# Patient Record
Sex: FEMALE | Race: ASIAN | NOT HISPANIC OR LATINO | Employment: OTHER | URBAN - METROPOLITAN AREA
[De-identification: names, ages, dates, MRNs, and addresses within clinical notes are randomized per-mention and may not be internally consistent; named-entity substitution may affect disease eponyms.]

---

## 2017-04-09 ENCOUNTER — APPOINTMENT (OUTPATIENT)
Dept: RADIOLOGY | Facility: MEDICAL CENTER | Age: 77
End: 2017-04-09
Attending: EMERGENCY MEDICINE
Payer: MEDICARE

## 2017-04-09 ENCOUNTER — HOSPITAL ENCOUNTER (EMERGENCY)
Facility: MEDICAL CENTER | Age: 77
End: 2017-04-09
Attending: EMERGENCY MEDICINE
Payer: MEDICARE

## 2017-04-09 VITALS
SYSTOLIC BLOOD PRESSURE: 109 MMHG | WEIGHT: 125 LBS | RESPIRATION RATE: 18 BRPM | BODY MASS INDEX: 24.54 KG/M2 | HEART RATE: 89 BPM | OXYGEN SATURATION: 93 % | DIASTOLIC BLOOD PRESSURE: 80 MMHG | HEIGHT: 60 IN | TEMPERATURE: 98.1 F

## 2017-04-09 DIAGNOSIS — R06.00 DYSPNEA, UNSPECIFIED TYPE: ICD-10-CM

## 2017-04-09 PROCEDURE — 99283 EMERGENCY DEPT VISIT LOW MDM: CPT

## 2017-04-09 PROCEDURE — 71020 DX-CHEST-2 VIEWS: CPT

## 2017-04-09 ASSESSMENT — PAIN SCALES - GENERAL: PAINLEVEL_OUTOF10: 0

## 2017-04-09 NOTE — ED NOTES
"PT to triage c/o \"she just needs a chest x ray to rule out TB for her new group home\"  Per family pt has a \"yellow card\" that states she will always test positive for TB in a skin test and needs a CXR for a negative conformation for the group home  PT has no medical complaints at this time  Chief Complaint   Patient presents with   • Other     \"she just needs a domo x ray to rule out TB for her group home\"     Blood pressure 113/83, pulse 118, temperature 36.7 °C (98.1 °F), temperature source Temporal, resp. rate 16, height 1.524 m (5'), weight 56.7 kg (125 lb), SpO2 91 %.    "

## 2017-04-09 NOTE — ED AVS SNAPSHOT
Audingo Access Code: Activation code not generated  Current Audingo Status: Patient Declined    Your email address is not on file at [facility].  Email Addresses are required for you to sign up for Audingo, please contact [phone] to verify your personal information and to provide your email address prior to attempting to register for Audingo.    Malena Milton  [address]  [place] [address]    PISTIS Consultt  A secure, online tool to manage your health information     [facility]’s Audingo® is a secure, online tool that connects you to your personalized health information from the privacy of your home -- day or night - making it very easy for you to manage your healthcare. Once the activation process is completed, you can even access your medical information using the Audingo oracio, which is available for free in the Apple Oracio store or Google Play store.     To learn more about Audingo, visit [email or web address]    There are two levels of access available (as shown below):   My Chart Features  [facility] Primary Care Doctor [facility]  Specialists [facility]  Urgent  Care Non-[facility] Primary Care Doctor   Email your healthcare team securely and privately 24/7 X X X    Manage appointments: schedule your next appointment; view details of past/upcoming appointments X      Request prescription refills. X      View recent personal medical records, including lab and immunizations X X X X   View health record, including health history, allergies, medications X X X X   Read reports about your outpatient visits, procedures, consult and ER notes X X X X   See your discharge summary, which is a recap of your hospital and/or ER visit that includes your diagnosis, lab results, and care plan X X  X     How to register for PISTIS Consultt:  Once your e-mail address has been verified, follow the following steps to sign up for PISTIS Consultt.     1. Go to  [email or web address]  2. Click on the Sign Up Now box, which takes you to the New Member  Sign Up page. You will need to provide the following information:  a. Enter your [facility] Access Code exactly as it appears at the top of this page. (You will not need to use this code after you’ve completed the sign-up process. If you do not sign up before the expiration date, you must request a new code.)   b. Enter your date of birth.   c. Enter your home email address.   d. Click Submit, and follow the next screen’s instructions.  3. Create a Masterbranch ID. This will be your Masterbranch login ID and cannot be changed, so think of one that is secure and easy to remember.  4. Create a Masterbranch password. You can change your password at any time.  5. Enter your Password Reset Question and Answer. This can be used at a later time if you forget your password.   6. Enter your e-mail address. This allows you to receive e-mail notifications when new information is available in Masterbranch.  7. Click Sign Up. You can now view your health information.    For assistance activating your Masterbranch account, call [phone]

## 2017-04-09 NOTE — ED AVS SNAPSHOT
4/9/2017          Malena Milton  [address]  [place] [address]    Dear Malena:    [facility] wants to ensure your discharge home is safe and you or your loved ones have had all your questions answered regarding your care after you leave the hospital.    You may receive a telephone call within two days of your discharge.  This call is to make certain you understand your discharge instructions as well as ensure we provided you with the best care possible during your stay with us.     The call will only last approximately 3-5 minutes and will be done by a nurse.    Once again, we want to ensure your discharge home is safe and that you have a clear understanding of any next steps in your care.  If you have any questions or concerns, please do not hesitate to contact us, we are here for you.  Thank you for choosing [facility] for your healthcare needs.    Sincerely,    Harry Jurado    [facility]

## 2017-04-09 NOTE — ED AVS SNAPSHOT
Home Care Instructions                                                                                                                Malena Milton   MRN: [id number]    Department:  [facility], Emergency Dept   Date of Visit:  4/9/2017            [facility], Emergency Dept    [address] [place] [address]    Phone:  [phone]      You were seen by     Edmar iRder M.D.      Your Diagnosis Was     Dyspnea, unspecified type     R06.00       Follow-up Information     1. Follow up with [facility], Emergency Dept.    Specialty:  Emergency Medicine    Why:  As needed    Contact information    [address] [place] [id number] [phone]      Medication Information     Review all of your home medications and newly ordered medications with your primary doctor and/or pharmacist as soon as possible. Follow medication instructions as directed by your doctor and/or pharmacist.     Please keep your complete medication list with you and share with your physician. Update the information when medications are discontinued, doses are changed, or new medications (including over-the-counter products) are added; and carry medication information at all times in the event of emergency situations.               Medication List      ASK your doctor about these medications        Instructions    Morning Afternoon Evening Bedtime    acetaminophen 500 MG Tabs   Commonly known as:  TYLENOL        Take 500 mg by mouth every 4 hours. Indications: Pain   Dose:  500 mg                        alendronate 10 MG Tabs   Commonly known as:  FOSAMAX        Take 35 mg by mouth every 7 days.   Dose:  35 mg                        atenolol 50 MG Tabs   Commonly known as:  TENORMIN        Take 50 mg by mouth 2 times a day.   Dose:  50 mg                        clopidogrel 75 MG Tabs   Commonly known as:  PLAVIX        Take 75 mg by mouth every day.   Dose:  75 mg                       levetiracetam 500 MG Tabs   Commonly known as:  KEPPRA        Take 500 mg by mouth 2 times a day.   Dose:  500 mg                        nitrofurantoin monohydr macro 100 MG Caps   Commonly known as:  MACROBID        Take 1 Cap by mouth 2 times a day.   Dose:  100 mg                        pravastatin 20 MG Tabs   Commonly known as:  PRAVACHOL        Take 40 mg by mouth every day.   Dose:  40 mg                        trazodone 50 MG Tabs   Commonly known as:  DESYREL        Take 50 mg by mouth every evening.   Dose:  50 mg                        vitamin D 1000 UNIT Tabs   Commonly known as:  cholecalciferol        Take 1,000 Units by mouth every day.   Dose:  1000 Units                                Procedures and tests performed during your visit     DX-CHEST-2 VIEWS        Discharge Instructions       Return for any further concern    Patient had a clear x-ray          Patient Information     Patient Information    Following emergency treatment: all patient requiring follow-up care must return either to a private physician or a clinic if your condition worsens before you are able to obtain further medical attention, please return to the emergency room.     Billing Information    At [facility], we work to make the billing process streamlined for our patients.  Our Representatives are here to answer any questions you may have regarding your hospital bill.  If you have insurance coverage and have supplied your insurance information to us, we will submit a claim to your insurer on your behalf.  Should you have any questions regarding your bill, we can be reached online or by phone as follows:  Online: You are able pay your bills online or live chat with our representatives about any billing questions you may have. We are here to help Monday - Friday from 8:00am to 7:30pm and 9:00am - 12:00pm on Saturdays.  Please visit [email or web address]  for more information.   Phone:   [phone] or [phone]    Please note that your emergency physician, surgeon, pathologist, radiologist, anesthesiologist, and other specialists are not employed by [facility] and will therefore bill separately for their services.  Please contact them directly for any questions concerning their bills at the numbers below:     Emergency Physician Services:  [phone]  [facility]:  [phone]  [facility]:  [phone]  [facility]:  [phone]    1. Your final bill may vary from the amount quoted upon discharge if all procedures are not complete at that time, or if your doctor has additional procedures of which we are not aware. You will receive an additional bill if you return to the Emergency Department at [facility] for suture removal regardless of the facility of which the sutures were placed.     2. Please arrange for settlement of this account at the emergency registration.    3. All self-pay accounts are due in full at the time of treatment.  If you are unable to meet this obligation then payment is expected within 4-5 days.     4. If you have had radiology studies (CT, X-ray, Ultrasound, MRI), you have received a preliminary result during your emergency department visit. Please contact the radiology department [phone] to receive a copy of your final result. Please discuss the Final result with your primary physician or with the follow up physician provided.     Crisis Hotline:  [place] Crisis Hotline:  0-520-DEOYVKU or 1-777.949.7298  [place] Crisis Hotline:    1-244.605.5764 or 500-729-3494         ED Discharge Follow Up Questions    1. In order to provide you with very good care, we would like to follow up with a phone call in the next few days.  May we have your permission to contact you?     YES /  NO    2. What is the best phone number to call you? (       )_____-__________    3. What is the best time to call you?      Morning  /   Afternoon  /  Evening                   Patient Signature:  ____________________________________________________________    Date:  ____________________________________________________________

## 2017-04-09 NOTE — ED PROVIDER NOTES
"ED Provider Note    CHIEF COMPLAINT  Chief Complaint   Patient presents with   • Other     \"she just needs a domo x ray to rule out TB for her group home\"       HPI  Malena Milton is a 77 y.o. female who presents to the emergency department asking for a chest x-ray to help clear her for possible TB. The patient has had positive skin test and she was sent here to have a chest x-ray to rule out active TB. The patient does not have any symptoms at this time.    REVIEW OF SYSTEMS  No fevers    PHYSICAL EXAM  VITAL SIGNS: /83 mmHg  Pulse 110  Temp(Src) 36.7 °C (98.1 °F) (Temporal)  Resp 16  Ht 1.524 m (5')  Wt 56.7 kg (125 lb)  BMI 24.41 kg/m2  SpO2 94%  In general the patient does not appear toxic  Pulmonary chest clear to auscultation bilaterally  Cardiovascular S1 and S2 with a slightly tachycardic rate      COURSE & MEDICAL DECISION MAKING  Pertinent Labs & Imaging studies reviewed. (See chart for details)  This a 77-year-old female who is sent for a chest x-ray to help rule out tuberculosis. Chest x-ray is clear. Therefore she'll be discharged with instructions to return for any concerns.    FINAL IMPRESSION  1. Evaluation for latent TB         Disposition  The patient will be discharged in stable condition      Electronically signed by: Edmar Rider, 4/9/2017 4:57 PM        "

## 2017-04-10 NOTE — ED NOTES
Break RN  Discharge instructions given to pt's son and verbalized understanding. Explained to pt's son that xray is clear. No new complaints made. Discharged via wheelchair.

## 2017-06-03 ENCOUNTER — APPOINTMENT (OUTPATIENT)
Dept: RADIOLOGY | Facility: MEDICAL CENTER | Age: 77
End: 2017-06-03
Attending: EMERGENCY MEDICINE
Payer: MEDICARE

## 2017-06-03 ENCOUNTER — HOSPITAL ENCOUNTER (EMERGENCY)
Facility: MEDICAL CENTER | Age: 77
End: 2017-06-03
Attending: EMERGENCY MEDICINE
Payer: MEDICARE

## 2017-06-03 VITALS
SYSTOLIC BLOOD PRESSURE: 119 MMHG | TEMPERATURE: 99 F | RESPIRATION RATE: 14 BRPM | HEART RATE: 89 BPM | WEIGHT: 102.29 LBS | BODY MASS INDEX: 20.08 KG/M2 | DIASTOLIC BLOOD PRESSURE: 77 MMHG | OXYGEN SATURATION: 93 % | HEIGHT: 60 IN
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
## 2017-06-03 DIAGNOSIS — S20.212A CONTUSION OF RIB, LEFT, INITIAL ENCOUNTER: ICD-10-CM

## 2017-06-03 PROCEDURE — 71010 DX-CHEST-PORTABLE (1 VIEW): CPT

## 2017-06-03 PROCEDURE — 99283 EMERGENCY DEPT VISIT LOW MDM: CPT

## 2017-06-03 PROCEDURE — 72170 X-RAY EXAM OF PELVIS: CPT

## 2017-06-03 ASSESSMENT — PAIN SCALES - GENERAL: PAINLEVEL_OUTOF10: 6

## 2017-06-03 NOTE — ED AVS SNAPSHOT
Home Care Instructions                                                                                                                Malena Milton   MRN: [id number]    Department:  [facility], Emergency Dept   Date of Visit:  6/3/2017            [facility], Emergency Dept    [address]    [place] [address]    Phone:  [phone]      You were seen by     Zay Bolaños M.D.      Your Diagnosis Was     Fall, initial encounter     W19.XXXA       Medication Information     Review all of your home medications and newly ordered medications with your primary doctor and/or pharmacist as soon as possible. Follow medication instructions as directed by your doctor and/or pharmacist.     Please keep your complete medication list with you and share with your physician. Update the information when medications are discontinued, doses are changed, or new medications (including over-the-counter products) are added; and carry medication information at all times in the event of emergency situations.               Medication List      ASK your doctor about these medications        Instructions    Morning Afternoon Evening Bedtime    acetaminophen 500 MG Tabs   Commonly known as:  TYLENOL        Take 500 mg by mouth every 4 hours. Indications: Pain   Dose:  500 mg                        alendronate 10 MG Tabs   Commonly known as:  FOSAMAX        Take 35 mg by mouth every 7 days.   Dose:  35 mg                        atenolol 50 MG Tabs   Commonly known as:  TENORMIN        Take 50 mg by mouth 2 times a day.   Dose:  50 mg                        clopidogrel 75 MG Tabs   Commonly known as:  PLAVIX        Take 75 mg by mouth every day.   Dose:  75 mg                        levetiracetam 500 MG Tabs   Commonly known as:  KEPPRA        Take 500 mg by mouth 2 times a day.   Dose:  500 mg                        nitrofurantoin monohydr macro 100 MG Caps   Commonly known as:  MACROBID        Take 1  Cap by mouth 2 times a day.   Dose:  100 mg                        pravastatin 20 MG Tabs   Commonly known as:  PRAVACHOL        Take 40 mg by mouth every day.   Dose:  40 mg                        trazodone 50 MG Tabs   Commonly known as:  DESYREL        Take 50 mg by mouth every evening.   Dose:  50 mg                        vitamin D 1000 UNIT Tabs   Commonly known as:  cholecalciferol        Take 1,000 Units by mouth every day.   Dose:  1000 Units                                Procedures and tests performed during your visit     DX-CHEST-PORTABLE (1 VIEW)    DX-PELVIS-1 OR 2 VIEWS        Discharge Instructions       Contusion  A contusion is a deep bruise. Contusions are the result of an injury that caused bleeding under the skin. The contusion may turn blue, purple, or yellow. Minor injuries will give you a painless contusion, but more severe contusions may stay painful and swollen for a few weeks.   CAUSES   A contusion is usually caused by a blow, trauma, or direct force to an area of the body.  SYMPTOMS   · Swelling and redness of the injured area.  · Bruising of the injured area.  · Tenderness and soreness of the injured area.  · Pain.  DIAGNOSIS   The diagnosis can be made by taking a history and physical exam. An X-ray, CT scan, or MRI may be needed to determine if there were any associated injuries, such as fractures.  TREATMENT   Specific treatment will depend on what area of the body was injured. In general, the best treatment for a contusion is resting, icing, elevating, and applying cold compresses to the injured area. Over-the-counter medicines may also be recommended for pain control. Ask your caregiver what the best treatment is for your contusion.  HOME CARE INSTRUCTIONS   · Put ice on the injured area.  ¨ Put ice in a plastic bag.  ¨ Place a towel between your skin and the bag.  ¨ Leave the ice on for 15-20 minutes, 3-4 times a day, or as directed by your health care provider.  · Only take  over-the-counter or prescription medicines for pain, discomfort, or fever as directed by your caregiver. Your caregiver may recommend avoiding anti-inflammatory medicines (aspirin, ibuprofen, and naproxen) for 48 hours because these medicines may increase bruising.  · Rest the injured area.  · If possible, elevate the injured area to reduce swelling.  SEEK IMMEDIATE MEDICAL CARE IF:   · You have increased bruising or swelling.  · You have pain that is getting worse.  · Your swelling or pain is not relieved with medicines.  MAKE SURE YOU:   · Understand these instructions.  · Will watch your condition.  · Will get help right away if you are not doing well or get worse.     This information is not intended to replace advice given to you by your health care provider. Make sure you discuss any questions you have with your health care provider.     Document Released: 09/27/2006 Document Revised: 12/23/2014 Document Reviewed: 10/22/2012  [employer] Interactive Patient Education ©2016 [employer]            Patient Information     Patient Information    Following emergency treatment: all patient requiring follow-up care must return either to a private physician or a clinic if your condition worsens before you are able to obtain further medical attention, please return to the emergency room.     Billing Information    At [facility], we work to make the billing process streamlined for our patients.  Our Representatives are here to answer any questions you may have regarding your hospital bill.  If you have insurance coverage and have supplied your insurance information to us, we will submit a claim to your insurer on your behalf.  Should you have any questions regarding your bill, we can be reached online or by phone as follows:  Online: You are able pay your bills online or live chat with our representatives about any billing questions you may have. We are here to help Monday - Friday from 8:00am to 7:30pm and 9:00am -  12:00pm on Saturdays.  Please visit [email or web address]  for more information.   Phone:  [phone] or [phone]    Please note that your emergency physician, surgeon, pathologist, radiologist, anesthesiologist, and other specialists are not employed by [facility] and will therefore bill separately for their services.  Please contact them directly for any questions concerning their bills at the numbers below:     Emergency Physician Services:  [phone]  [facility]:  [phone]  [facility]:  [phone]  [facility]:  [phone]    1. Your final bill may vary from the amount quoted upon discharge if all procedures are not complete at that time, or if your doctor has additional procedures of which we are not aware. You will receive an additional bill if you return to the Emergency Department at [facility] for suture removal regardless of the facility of which the sutures were placed.     2. Please arrange for settlement of this account at the emergency registration.    3. All self-pay accounts are due in full at the time of treatment.  If you are unable to meet this obligation then payment is expected within 4-5 days.     4. If you have had radiology studies (CT, X-ray, Ultrasound, MRI), you have received a preliminary result during your emergency department visit. Please contact the radiology department [phone] to receive a copy of your final result. Please discuss the Final result with your primary physician or with the follow up physician provided.     Crisis Hotline:  [place] Crisis Hotline:  1-571-NWFUQUS or 1-282.992.3046  [place] Crisis Hotline:    1-397.279.7255 or 879-081-8054         ED Discharge Follow Up Questions    1. In order to provide you with very good care, we would like to follow up with a phone call in the next few days.  May we have your permission to contact you?     YES /  NO    2. What is  the best phone number to call you? (       )_____-__________    3. What is the best time to call you?      Morning  /  Afternoon  /  Evening                   Patient Signature:  ____________________________________________________________    Date:  ____________________________________________________________

## 2017-06-03 NOTE — ED AVS SNAPSHOT
6/3/2017    Malena Milton  [address]  [place] [address]    Dear Malena:    [facility] wants to ensure your discharge home is safe and you or your loved ones have had all of your questions answered regarding your care after you leave the hospital.    Below is a list of resources and contact information should you have any questions regarding your hospital stay, follow-up instructions, or active medical symptoms.    Questions or Concerns Regarding… Contact   Medical Questions Related to Your Discharge  (7 days a week, 8am-5pm) Contact a Nurse Care Coordinator   [phone]   Medical Questions Not Related to Your Discharge  (24 hours a day / 7 days a week)  Contact the Nurse Health Line   [phone]    Medications or Discharge Instructions Refer to your discharge packet   or contact your [facility] Primary Care Provider   [phone]   Follow-up Appointment(s) Schedule your appointment via [employer]   or contact Scheduling [phone]   Billing Review your statement via [employer]  or contact Billing [phone]   Medical Records Review your records via [employer]   or contact Medical Records [phone]     You may receive a telephone call within two days of discharge. This call is to make certain you understand your discharge instructions and have the opportunity to have any questions answered. You can also easily access your medical information, test results and upcoming appointments via the [employer] free online health management tool. You can learn more and sign up at [employer]. For assistance setting up your [employer] account, please call [phone].    Once again, we want to ensure your discharge home is safe and that you have a clear understanding of any next steps in your care. If you have any questions or concerns, please do not hesitate to contact us, we are here for you. Thank you for choosing [facility] for your healthcare needs.    Sincerely,    Your [facility] Healthcare Team

## 2017-06-03 NOTE — ED NOTES
Chief Complaint   Patient presents with   • GLF     to triage per w/c. pt w/c bound, fell this AM w/ assisted tx.   • Hip Pain     Bilat hip pain   • Rib Pain     L rib pain, difficult to take deep breath.   Son w/pt,  Educated in triage process.  Returned to [facility],  Asked to inform staff of needs.

## 2017-06-03 NOTE — ED PROVIDER NOTES
ED Provider Note    Scribed for Zay Bolaños M.D. by Kari Andre. 6/3/2017  4:20 PM    Primary care provider: Lyssa Carlos M.D.  Means of arrival: Private vehicle  History obtained from: Patient  History limited by: None    CHIEF COMPLAINT  Chief Complaint   Patient presents with   • GLF     to triage per w/c. pt w/c bound, fell this AM w/ assisted tx.   • Hip Pain     Bilat hip pain   • Rib Pain     L rib pain, difficult to take deep breath.       HPI  Malena Milton is a 77 y.o. female who presents to the Emergency Department for evaluation after having a ground level fall this morning around 7:00AM. Patient lives in a nursing home. A caregiver was helping transfer patient out of her bed into her wheelchair when the caregiver's foot slipped and they both fell to the ground. Caregiver states that the patient fell on the caregiver during the fall. She is complaining of left sided chest pain and under her left armpit. Patient states she has some pain when she takes a deep breath. She had left hip pain initially after the fall but denies any hip pain at this time. Patient denies any neck pain. She denies hitting her head during the fall and denies any loss of consciousness. Patient already takes pain medication for chronic pain. Patient is non-ambulatory.     REVIEW OF SYSTEMS  Pertinent negatives include no neck pain, hip pain, loss of consciousness.      PAST MEDICAL HISTORY   has a past medical history of Hypertension and Stroke (CMS-HCC) (06/2013).    SURGICAL HISTORY  patient denies any surgical history    SOCIAL HISTORY  Social History   Substance Use Topics   • Smoking status: Former Smoker   • Alcohol Use: No      History   Drug Use No       FAMILY HISTORY  No pertinent family history    CURRENT MEDICATIONS  No current facility-administered medications on file prior to encounter.     Current Outpatient Prescriptions on File Prior to Encounter   Medication Sig Dispense Refill   • atenolol (TENORMIN)  50 MG TABS Take 50 mg by mouth 2 times a day.     • clopidogrel (PLAVIX) 75 MG TABS Take 75 mg by mouth every day.     • trazodone (DESYREL) 50 MG TABS Take 50 mg by mouth every evening.     • acetaminophen (TYLENOL) 500 MG TABS Take 500 mg by mouth every 4 hours. Indications: Pain     • levetiracetam (KEPPRA) 500 MG TABS Take 500 mg by mouth 2 times a day.     • pravastatin (PRAVACHOL) 20 MG TABS Take 40 mg by mouth every day.     • alendronate (FOSAMAX) 10 MG TABS Take 35 mg by mouth every 7 days.     • vitamin D (CHOLECALCIFEROL) 1000 UNIT TABS Take 1,000 Units by mouth every day.     • nitrofurantoin monohydr macro (MACROBID) 100 MG CAPS Take 1 Cap by mouth 2 times a day. 20 Cap 0       ALLERGIES  No Known Allergies    PHYSICAL EXAM  VITAL SIGNS: /77 mmHg  Pulse 89  Temp(Src) 37.2 °C (99 °F)  Resp 14  Ht 1.524 m (5')  Wt 46.4 kg (102 lb 4.7 oz)  BMI 19.98 kg/m2  SpO2 93%  Constitutional: Well developed, Well nourished, No acute distress, Non-toxic appearance.   Neck: Normal range of motion, No tenderness, Supple, No stridor. No meningismus.   Cardiovascular: Regular rate and rhythm without murmur rub or gallop.  Thorax & Lungs: Clear breath sounds bilaterally without wheezes, rhonchi or rales. Tenderness of left lateral chest wall.    Abdomen: Soft non-tender non-distended. There is no rebound or guarding. No organomegaly is appreciated. Bowel sounds are normal.  Back: No CVA or spinal tenderness.   Extremities: Intact distal pulses, No edema, No tenderness, No cyanosis, No clubbing. Capillary refill is less than 2 seconds.  Musculoskeletal: Leg shortening on the left with orthopedic brace. No tenderness to palpation or major deformities noted.   Neurologic: Alert & oriented x 3, Normal motor function, Normal sensory function, No focal deficits noted. Reflexes are normal.    RADIOLOGY  DX-PELVIS-1 OR 2 VIEWS   Final Result      No evidence of pelvic fracture.      DX-CHEST-PORTABLE (1 VIEW)   Final  Result      No acute cardiac or pulmonary abnormalities are identified.        The radiologist's interpretation of all radiological studies have been reviewed by me.    COURSE & MEDICAL DECISION MAKING  Nursing notes, VS, PMSFHx reviewed in chart.    4:20 PM Patient seen and examined at bedside. Ordered for chest x-ray and pelvis x-ray to evaluate. Patient already takes pain medication for chronic pain and previous symptoms. She will not be given any further medication at this time.     5:03 PM The images of the patient's radiology studies were independently reviewed by me which did not indicate any fractures. She does not have any significant pelvic tenderness on examination but I think she does have a left-sided rib contusion doubt fracture.    5:17 PM Upon reevaluation of patient, she is resting comfortably and has no further complaints. I informed of her radiology results. Patient can be discharged home. She was instructed to return to the ED if her symptoms worsen. Patient and her son understood and were in agreement with this discharge plan. Patient is comfortable on son is prepared her for discharge    Patient has had high blood pressure while in the emergency department, felt likely secondary to medical condition. Counseled patient to monitor blood pressure at home and follow up with primary care physician.     The patient will return for new or worsening symptoms and is stable at the time of discharge.    DISPOSITION:  Patient will be discharged home in stable condition.    FINAL IMPRESSION  1. Fall, initial encounter    2. Contusion of rib, left, initial encounter          Kari ALEGRIA (Jacques), am scribing for, and in the presence of, Zay Bolaños M.D..    Electronically signed by: Kari Andre (Jacques), 6/3/2017    Zay ALEGIRA M.D. personally performed the services described in this documentation, as scribed by Kari Andre in my presence, and it is both accurate and  complete.    The note accurately reflects work and decisions made by me.  Zay Bolaños  6/3/2017  5:19 PM

## 2017-06-03 NOTE — ED NOTES
Pt to rm 63, pt transferred to bed , pants lowered to expose hips , no obvious deformities noted, pt able to bear wt

## 2017-06-04 NOTE — ED NOTES
Pt discharged with instructions and script. Pt and son  verbalize the understanding of instructions. Pt dc'd out of ER without any difficulty.

## 2017-08-04 ENCOUNTER — HOSPITAL ENCOUNTER (EMERGENCY)
Facility: MEDICAL CENTER | Age: 77
End: 2017-08-04
Attending: EMERGENCY MEDICINE
Payer: MEDICARE

## 2017-08-04 VITALS
HEIGHT: 62 IN | HEART RATE: 76 BPM | OXYGEN SATURATION: 100 % | WEIGHT: 105 LBS | TEMPERATURE: 97.9 F | RESPIRATION RATE: 18 BRPM | BODY MASS INDEX: 19.32 KG/M2

## 2017-08-04 DIAGNOSIS — K59.00 CONSTIPATION, UNSPECIFIED CONSTIPATION TYPE: ICD-10-CM

## 2017-08-04 DIAGNOSIS — R10.10 PAIN OF UPPER ABDOMEN: ICD-10-CM

## 2017-08-04 LAB
ALBUMIN SERPL BCP-MCNC: 3.8 G/DL (ref 3.2–4.9)
ALBUMIN/GLOB SERPL: 1.1 G/DL
ALP SERPL-CCNC: 49 U/L (ref 30–99)
ALT SERPL-CCNC: 12 U/L (ref 2–50)
ANION GAP SERPL CALC-SCNC: 8 MMOL/L (ref 0–11.9)
ANISOCYTOSIS BLD QL SMEAR: ABNORMAL
AST SERPL-CCNC: 19 U/L (ref 12–45)
BASOPHILS # BLD AUTO: 0 % (ref 0–1.8)
BASOPHILS # BLD: 0 K/UL (ref 0–0.12)
BILIRUB SERPL-MCNC: 0.4 MG/DL (ref 0.1–1.5)
BUN SERPL-MCNC: 17 MG/DL (ref 8–22)
CALCIUM SERPL-MCNC: 9.1 MG/DL (ref 8.5–10.5)
CHLORIDE SERPL-SCNC: 108 MMOL/L (ref 96–112)
CO2 SERPL-SCNC: 25 MMOL/L (ref 20–33)
CREAT SERPL-MCNC: 0.67 MG/DL (ref 0.5–1.4)
EOSINOPHIL # BLD AUTO: 0 K/UL (ref 0–0.51)
EOSINOPHIL NFR BLD: 0 % (ref 0–6.9)
ERYTHROCYTE [DISTWIDTH] IN BLOOD BY AUTOMATED COUNT: 51.4 FL (ref 35.9–50)
GFR SERPL CREATININE-BSD FRML MDRD: >60 ML/MIN/1.73 M 2
GLOBULIN SER CALC-MCNC: 3.4 G/DL (ref 1.9–3.5)
GLUCOSE SERPL-MCNC: 115 MG/DL (ref 65–99)
HCT VFR BLD AUTO: 38.9 % (ref 37–47)
HGB BLD-MCNC: 13.1 G/DL (ref 12–16)
LIPASE SERPL-CCNC: 18 U/L (ref 11–82)
LYMPHOCYTES # BLD AUTO: 0.99 K/UL (ref 1–4.8)
LYMPHOCYTES NFR BLD: 16.8 % (ref 22–41)
MACROCYTES BLD QL SMEAR: ABNORMAL
MANUAL DIFF BLD: NORMAL
MCH RBC QN AUTO: 33.8 PG (ref 27–33)
MCHC RBC AUTO-ENTMCNC: 33.7 G/DL (ref 33.6–35)
MCV RBC AUTO: 100.3 FL (ref 81.4–97.8)
MONOCYTES # BLD AUTO: 0.57 K/UL (ref 0–0.85)
MONOCYTES NFR BLD AUTO: 9.7 % (ref 0–13.4)
MORPHOLOGY BLD-IMP: NORMAL
NEUTROPHILS # BLD AUTO: 4.34 K/UL (ref 2–7.15)
NEUTROPHILS NFR BLD: 72.6 % (ref 44–72)
NEUTS BAND NFR BLD MANUAL: 0.9 % (ref 0–10)
NRBC # BLD AUTO: 0 K/UL
NRBC BLD AUTO-RTO: 0 /100 WBC
PLATELET # BLD AUTO: 145 K/UL (ref 164–446)
PLATELET BLD QL SMEAR: NORMAL
PMV BLD AUTO: 9.9 FL (ref 9–12.9)
POTASSIUM SERPL-SCNC: 3.9 MMOL/L (ref 3.6–5.5)
PROT SERPL-MCNC: 7.2 G/DL (ref 6–8.2)
RBC # BLD AUTO: 3.88 M/UL (ref 4.2–5.4)
RBC BLD AUTO: PRESENT
SODIUM SERPL-SCNC: 141 MMOL/L (ref 135–145)
TROPONIN I SERPL-MCNC: <0.01 NG/ML (ref 0–0.04)
WBC # BLD AUTO: 5.9 K/UL (ref 4.8–10.8)

## 2017-08-04 PROCEDURE — 93005 ELECTROCARDIOGRAM TRACING: CPT | Performed by: EMERGENCY MEDICINE

## 2017-08-04 PROCEDURE — 36415 COLL VENOUS BLD VENIPUNCTURE: CPT

## 2017-08-04 PROCEDURE — 83690 ASSAY OF LIPASE: CPT

## 2017-08-04 PROCEDURE — 80053 COMPREHEN METABOLIC PANEL: CPT

## 2017-08-04 PROCEDURE — 304561 HCHG STAT O2

## 2017-08-04 PROCEDURE — 84484 ASSAY OF TROPONIN QUANT: CPT

## 2017-08-04 PROCEDURE — 85007 BL SMEAR W/DIFF WBC COUNT: CPT

## 2017-08-04 PROCEDURE — 99285 EMERGENCY DEPT VISIT HI MDM: CPT

## 2017-08-04 PROCEDURE — 85027 COMPLETE CBC AUTOMATED: CPT

## 2017-08-04 RX ORDER — SALINE 7; 19 G/118ML; G/118ML
1 ENEMA RECTAL ONCE
Qty: 1 EACH | Refills: 0 | Status: SHIPPED | OUTPATIENT
Start: 2017-08-04 | End: 2017-08-04

## 2017-08-04 NOTE — ED AVS SNAPSHOT
Home Care Instructions                                                                                                                Malena Milton   MRN: [id number]    Department:  [facility], Emergency Dept   Date of Visit:  8/4/2017            [facility], Emergency Dept    [address]    [facility] [address]    Phone:  [phone]      You were seen by     Lucius Vergara M.D.      Your Diagnosis Was     Pain of upper abdomen     R10.10       Follow-up Information     1. Follow up with JOSE ELIAS Becerra In 1 day.    Specialty:  Family Medicine    Contact information    [address]  [address]  [facility] [id number] [phone]        Medication Information     Review all of your home medications and newly ordered medications with your primary doctor and/or pharmacist as soon as possible. Follow medication instructions as directed by your doctor and/or pharmacist.     Please keep your complete medication list with you and share with your physician. Update the information when medications are discontinued, doses are changed, or new medications (including over-the-counter products) are added; and carry medication information at all times in the event of emergency situations.               Medication List      START taking these medications        Instructions    Morning Afternoon Evening Bedtime    sodium phosphate enema   Commonly known as:  FLEET        Insert 133 mL in rectum Once for 1 dose.   Dose:  1 Enema                          ASK your doctor about these medications        Instructions    Morning Afternoon Evening Bedtime    acetaminophen 500 MG Tabs   Commonly known as:  TYLENOL        Take 500 mg by mouth every 4 hours. Indications: Pain   Dose:  500 mg                        alendronate 10 MG Tabs   Commonly known as:  FOSAMAX        Take 35 mg by mouth every 7 days.   Dose:  35 mg                        atenolol 50 MG Tabs   Commonly known as:  TENORMIN        Take 50 mg by mouth 2 times a day.   Dose:  50 mg                        clopidogrel 75 MG Tabs   Commonly known as:  PLAVIX        Take 75 mg by mouth every day.   Dose:  75 mg                        levetiracetam 500 MG Tabs   Commonly known as:  KEPPRA        Take 500 mg by mouth 2 times a day.   Dose:  500 mg                        nitrofurantoin monohydr macro 100 MG Caps   Commonly known as:  MACROBID        Take 1 Cap by mouth 2 times a day.   Dose:  100 mg                        pravastatin 20 MG Tabs   Commonly known as:  PRAVACHOL        Take 40 mg by mouth every day.   Dose:  40 mg                        trazodone 50 MG Tabs   Commonly known as:  DESYREL        Take 50 mg by mouth every evening.   Dose:  50 mg                        vitamin D 1000 UNIT Tabs   Commonly known as:  cholecalciferol        Take 1,000 Units by mouth every day.   Dose:  1000 Units                             Where to Get Your Medications      You can get these medications from any pharmacy     Bring a paper prescription for each of these medications    - sodium phosphate enema            Procedures and tests performed during your visit     CBC WITH DIFFERENTIAL    COMP METABOLIC PANEL    DIFFERENTIAL MANUAL    EKG (ER)    ESTIMATED GFR    LIPASE    MORPHOLOGY    PERIPHERAL SMEAR REVIEW    PLATELET ESTIMATE    TROPONIN        Discharge Instructions       Abdominal Pain, Adult  Many things can cause abdominal pain. Usually, abdominal pain is not caused by a disease and will improve without treatment. It can often be observed and treated at home. Your health care provider will do a physical exam and possibly order blood tests and X-rays to help determine the seriousness of your pain. However, in many cases, more time must pass before a clear cause of the pain can be found. Before that point, your health care provider may not know if you need more testing or further treatment.  HOME CARE INSTRUCTIONS   Monitor your abdominal pain  for any changes. The following actions may help to alleviate any discomfort you are experiencing:  · Only take over-the-counter or prescription medicines as directed by your health care provider.  · Do not take laxatives unless directed to do so by your health care provider.  · Try a clear liquid diet (broth, tea, or water) as directed by your health care provider. Slowly move to a bland diet as tolerated.  SEEK MEDICAL CARE IF:  · You have unexplained abdominal pain.  · You have abdominal pain associated with nausea or diarrhea.  · You have pain when you urinate or have a bowel movement.  · You experience abdominal pain that wakes you in the night.  · You have abdominal pain that is worsened or improved by eating food.  · You have abdominal pain that is worsened with eating fatty foods.  · You have a fever.  SEEK IMMEDIATE MEDICAL CARE IF:   · Your pain does not go away within 2 hours.  · You keep throwing up (vomiting).  · Your pain is felt only in portions of the abdomen, such as the right side or the left lower portion of the abdomen.  · You pass bloody or black tarry stools.  MAKE SURE YOU:  · Understand these instructions.    · Will watch your condition.    · Will get help right away if you are not doing well or get worse.       This information is not intended to replace advice given to you by your health care provider. Make sure you discuss any questions you have with your health care provider.     Document Released: 09/27/2006 Document Revised: 01/08/2016 Document Reviewed: 08/27/2014  [employer] Interactive Patient Education ©2016 [employer]        Constipation, Adult  Constipation is when a person:  · Poops (has a bowel movement) less than 3 times a week.  · Has a hard time pooping.  · Has poop that is dry, hard, or bigger than normal.  HOME CARE   · Eat foods with a lot of fiber in them. This includes fruits, vegetables, beans, and whole grains such as brown rice.  · Avoid fatty foods and foods with a  lot of sugar. This includes french fries, hamburgers, cookies, candy, and soda.  · If you are not getting enough fiber from food, take products with added fiber in them (supplements).  · Drink enough fluid to keep your pee (urine) clear or pale yellow.  · Exercise on a regular basis, or as told by your doctor.  · Go to the restroom when you feel like you need to poop. Do not hold it.  · Only take medicine as told by your doctor. Do not take medicines that help you poop (laxatives) without talking to your doctor first.  GET HELP RIGHT AWAY IF:   · You have bright red blood in your poop (stool).  · Your constipation lasts more than 4 days or gets worse.  · You have belly (abdominal) or butt (rectal) pain.  · You have thin poop (as thin as a pencil).  · You lose weight, and it cannot be explained.  MAKE SURE YOU:   · Understand these instructions.  · Will watch your condition.  · Will get help right away if you are not doing well or get worse.     This information is not intended to replace advice given to you by your health care provider. Make sure you discuss any questions you have with your health care provider.     Document Released: 06/05/2009 Document Revised: 01/08/2016 Document Reviewed: 09/29/2014  [employer] Interactive Patient Education ©2016 [employer]            Patient Information     Patient Information    Following emergency treatment: all patient requiring follow-up care must return either to a private physician or a clinic if your condition worsens before you are able to obtain further medical attention, please return to the emergency room.     Billing Information    [facility] we work to make the billing process streamlined for our patients.  Our Representatives are here to answer any questions you may have regarding your hospital bill.  If you have insurance coverage and have supplied your insurance information to us, we will submit a claim to your insurer on your behalf.  Should you have  any questions regarding your bill, we can be reached online or by phone as follows:  Online: You are able pay your bills online or live chat with our representatives about any billing questions you may have. We are here to help Monday - Friday from 8:00am to 7:30pm and 9:00am - 12:00pm on Saturdays.  Please visit [email or web address]  for more information.   Phone:  [phone] or [phone]    Please note that your emergency physician, surgeon, pathologist, radiologist, anesthesiologist, and other specialists are not employed by [facility] and will therefore bill separately for their services.  Please contact them directly for any questions concerning their bills at the numbers below:     Emergency Physician Services:  [phone]  [facility]:  [phone]  [facility]:  [phone]  [facility]:  [phone]    1. Your final bill may vary from the amount quoted upon discharge if all procedures are not complete at that time, or if your doctor has additional procedures of which we are not aware. You will receive an additional bill if you return to the Emergency Department at [facility] for suture removal regardless of the facility of which the sutures were placed.     2. Please arrange for settlement of this account at the emergency registration.    3. All self-pay accounts are due in full at the time of treatment.  If you are unable to meet this obligation then payment is expected within 4-5 days.     4. If you have had radiology studies (CT, X-ray, Ultrasound, MRI), you have received a preliminary result during your emergency department visit. Please contact the radiology department [phone] to receive a copy of your final result. Please discuss the Final result with your primary physician or with the follow up physician provided.     Crisis Hotline:  National Crisis Hotline:  5-164-UPTSKTL or 1-682.123.2957  [place]  Crisis Hotline:    1-808.618.7847 or 603-588-3617         ED Discharge Follow Up Questions    1. In order to provide you with very good care, we would like to follow up with a phone call in the next few days.  May we have your permission to contact you?     YES /  NO    2. What is the best phone number to call you? (       )_____-__________    3. What is the best time to call you?      Morning  /  Afternoon  /  Evening                   Patient Signature:  ____________________________________________________________    Date:  ____________________________________________________________

## 2017-08-04 NOTE — ED AVS SNAPSHOT
BitPass Access Code: Activation code not generated  Current BitPass Status: Patient Declined    Your email address is not on file at [facility].  Email Addresses are required for you to sign up for BitPass, please contact [phone] to verify your personal information and to provide your email address prior to attempting to register for BitPass.    Malena Milton  [address]  [place] [address]    Raizlab[employer]  A secure, online tool to manage your health information     [facility]’s BitPass® is a secure, online tool that connects you to your personalized health information from the privacy of your home -- day or night - making it very easy for you to manage your healthcare. Once the activation process is completed, you can even access your medical information using the BitPass oracio, which is available for free in the Apple Oracio store or Google Play store.     To learn more about BitPass, visit [email or web address]    There are two levels of access available (as shown below):   My Chart Features  [facility] Primary Care Doctor [facility]  Specialists [facility]  Urgent  Care Non-[facility] Primary Care Doctor   Email your healthcare team securely and privately 24/7 X X X    Manage appointments: schedule your next appointment; view details of past/upcoming appointments X      Request prescription refills. X      View recent personal medical records, including lab and immunizations X X X X   View health record, including health history, allergies, medications X X X X   Read reports about your outpatient visits, procedures, consult and ER notes X X X X   See your discharge summary, which is a recap of your hospital and/or ER visit that includes your diagnosis, lab results, and care plan X X  X     How to register for Raiz[employer]:  Once your e-mail address has been verified, follow the following steps to sign up for Raiz[employer]st.     1. Go to  [email or web address]  2. Click on the Sign Up Now box, which takes you to the New Member  Sign Up page. You will need to provide the following information:  a. Enter your [employer] Access Code exactly as it appears at the top of this page. (You will not need to use this code after you’ve completed the sign-up process. If you do not sign up before the expiration date, you must request a new code.)   b. Enter your date of birth.   c. Enter your home email address.   d. Click Submit, and follow the next screen’s instructions.  3. Create a [employer] ID. This will be your [employer] login ID and cannot be changed, so think of one that is secure and easy to remember.  4. Create a [employer] password. You can change your password at any time.  5. Enter your Password Reset Question and Answer. This can be used at a later time if you forget your password.   6. Enter your e-mail address. This allows you to receive e-mail notifications when new information is available in [employer].  7. Click Sign Up. You can now view your health information.    For assistance activating your [employer] account, call [phone]

## 2017-08-04 NOTE — ED AVS SNAPSHOT
8/4/2017    Malena Milton  [address]  [place] [address]    Dear Malena:    [facility] wants to ensure your discharge home is safe and you or your loved ones have had all of your questions answered regarding your care after you leave the hospital.    Below is a list of resources and contact information should you have any questions regarding your hospital stay, follow-up instructions, or active medical symptoms.    Questions or Concerns Regarding… Contact   Medical Questions Related to Your Discharge  (7 days a week, 8am-5pm) Contact a Nurse Care Coordinator   [phone]   Medical Questions Not Related to Your Discharge  (24 hours a day / 7 days a week)  Contact the Nurse Health Line   [phone]    Medications or Discharge Instructions Refer to your discharge packet   or contact your [facility] Primary Care Provider   [phone]   Follow-up Appointment(s) Schedule your appointment via [employer]   or contact Scheduling [phone]   Billing Review your statement via [employer]  or contact Billing [phone]   Medical Records Review your records via [employer]   or contact Medical Records [phone]     You may receive a telephone call within two days of discharge. This call is to make certain you understand your discharge instructions and have the opportunity to have any questions answered. You can also easily access your medical information, test results and upcoming appointments via the [employer] free online health management tool. You can learn more and sign up at [employer]iting. For assistance setting up your [employer] account, please call [phone].    Once again, we want to ensure your discharge home is safe and that you have a clear understanding of any next steps in your care. If you have any questions or concerns, please do not hesitate to contact us, we are here for you. Thank you for choosing [facility] for your healthcare needs.    Sincerely,    Your [facility] Healthcare Team

## 2017-08-05 LAB — EKG IMPRESSION: NORMAL

## 2017-08-05 NOTE — ED NOTES
Patient arrives via EMS from home for acute onset LUQ abd pain radiating to RUQ. Patient states pain began approx. 1 hour prior to arrival. Denies nausea. VSS. Patient hx of CVA w/ LEFT sided deficit. Patient alert and oriented x 3.

## 2017-08-05 NOTE — ED NOTES
Son at bedside. More information obtained from him, son states that patient is from [facility] phone number [phone] and [phone].  notified and she is to call [facility] and work on arranging transport.

## 2017-08-05 NOTE — ED PROVIDER NOTES
"ED Provider Note    CHIEF COMPLAINT  Chief Complaint   Patient presents with   • LUQ Pain       HPI  Malena Milton is a 77 y.o. female who presents for evaluation of now resolved abdominal pain. She states an epigastric pain that seemed to radiate towards her right upper quadrant that began approximately 1 hour prior to arrival and one hour after eating. There was no nausea or vomiting, she had no chest pain or back pain, no diarrhea. In fact, she reports that she has not had a bowel movement 2 days which is abnormal for her. No fever. Her past medical history significant only for hypertension and a CVA leaving left-sided deficits. She arrives via ambulance, she states after the gave her medicine on the ambulance her pain totally resolved.    REVIEW OF SYSTEMS  Negative for fever, rash, chest pain, dyspnea, nausea, vomiting, diarrhea, headache, back pain. All other systems are negative.     PAST MEDICAL HISTORY  Past Medical History   Diagnosis Date   • Hypertension    • Stroke ([facility]) 06/2013   • [place]        FAMILY HISTORY  No family history on file.    SOCIAL HISTORY  Social History   Substance Use Topics   • Smoking status: Former Smoker   • Smokeless tobacco: Not on file   • Alcohol Use: No       SURGICAL HISTORY  No past surgical history on file.    CURRENT MEDICATIONS  I personally reviewed the medication list in the charting documentation.     ALLERGIES  No Known Allergies    MEDICAL RECORD  I have reviewed patient's medical record and pertinent results are listed above.      PHYSICAL EXAM  VITAL SIGNS: Pulse 64  Temp(Src) 36.6 °C (97.9 °F)  Resp 18  Ht 1.575 m (5' 2.01\")  Wt 47.628 kg (105 lb)  BMI 19.20 kg/m2  SpO2 99%   Constitutional: Appear to be in any acute distress, nontoxic.  HENT: Mucus membranes moist.    Eyes: No scleral icterus. Normal conjunctiva   Neck: Supple, comfortable, nonpainful range of motion.   Cardiovascular: Regular heart rate and rhythm. "   Thorax & Lungs: Chest is nontender.  Lungs are clear to auscultation with good air movement bilaterally.  No wheeze, rhonchi, nor rales.   Abdomen: Soft, with no tenderness, rebound nor guarding.  No mass or pulsatile mass appreciated.  Skin: Warm, dry. No rash appreciated  Extremities/Musculoskeletal: No sign of trauma. No asymmetric calf tenderness, erythema or edema. Normal range of motion   Neurologic: Left, lower greater than upper, extremities chronically weak with decreased movement.  Psychiatric: Normal affect appropriate for the clinical situation.    DIAGNOSTIC STUDIES / PROCEDURES    EKG  12 Lead EKG interpreted by me to show:    Rate 66  Rhythm: Normal sinus rhythm  Axis: Normal  MN and QRS Intervals: Normal  T waves: No acute changes  ST segments: No acute changes  Ectopy: None.    My impression of this EKG: Does not indicate ischemia or arrythmia at this time.      LABS  Results for orders placed or performed during the hospital encounter of 08/04/17   CBC WITH DIFFERENTIAL   Result Value Ref Range    WBC 5.9 4.8 - 10.8 K/uL    RBC 3.88 (L) 4.20 - 5.40 M/uL    Hemoglobin 13.1 12.0 - 16.0 g/dL    Hematocrit 38.9 37.0 - 47.0 %    .3 (H) 81.4 - 97.8 fL    MCH 33.8 (H) 27.0 - 33.0 pg    MCHC 33.7 33.6 - 35.0 g/dL    RDW 51.4 (H) 35.9 - 50.0 fL    Platelet Count 145 (L) 164 - 446 K/uL    MPV 9.9 9.0 - 12.9 fL    Nucleated RBC 0.00 /100 WBC    NRBC (Absolute) 0.00 K/uL    Neutrophils-Polys 72.60 (H) 44.00 - 72.00 %    Lymphocytes 16.80 (L) 22.00 - 41.00 %    Monocytes 9.70 0.00 - 13.40 %    Eosinophils 0.00 0.00 - 6.90 %    Basophils 0.00 0.00 - 1.80 %    Neutrophils (Absolute) 4.34 2.00 - 7.15 K/uL    Lymphs (Absolute) 0.99 (L) 1.00 - 4.80 K/uL    Monos (Absolute) 0.57 0.00 - 0.85 K/uL    Eos (Absolute) 0.00 0.00 - 0.51 K/uL    Baso (Absolute) 0.00 0.00 - 0.12 K/uL    Anisocytosis 1+     Macrocytosis 1+    COMP METABOLIC PANEL   Result Value Ref Range    Sodium 141 135 - 145 mmol/L    Potassium 3.9  3.6 - 5.5 mmol/L    Chloride 108 96 - 112 mmol/L    Co2 25 20 - 33 mmol/L    Anion Gap 8.0 0.0 - 11.9    Glucose 115 (H) 65 - 99 mg/dL    Bun 17 8 - 22 mg/dL    Creatinine 0.67 0.50 - 1.40 mg/dL    Calcium 9.1 8.5 - 10.5 mg/dL    AST(SGOT) 19 12 - 45 U/L    ALT(SGPT) 12 2 - 50 U/L    Alkaline Phosphatase 49 30 - 99 U/L    Total Bilirubin 0.4 0.1 - 1.5 mg/dL    Albumin 3.8 3.2 - 4.9 g/dL    Total Protein 7.2 6.0 - 8.2 g/dL    Globulin 3.4 1.9 - 3.5 g/dL    A-G Ratio 1.1 g/dL   LIPASE   Result Value Ref Range    Lipase 18 11 - 82 U/L   TROPONIN   Result Value Ref Range    Troponin I <0.01 0.00 - 0.04 ng/mL   ESTIMATED GFR   Result Value Ref Range    GFR If African American >60 >60 mL/min/1.73 m 2    GFR If Non African American >60 >60 mL/min/1.73 m 2   DIFFERENTIAL MANUAL   Result Value Ref Range    Bands-Stabs 0.90 0.00 - 10.00 %    Manual Diff Status PERFORMED    PERIPHERAL SMEAR REVIEW   Result Value Ref Range    Peripheral Smear Review see below    PLATELET ESTIMATE   Result Value Ref Range    Plt Estimation Decreased    MORPHOLOGY   Result Value Ref Range    RBC Morphology Present    EKG (ER)   Result Value Ref Range    Report       [facility] Emergency Dept.    Test Date:  2017  Pt Name:    CLAUS BECERRA                 Department: ER  MRN:        [id number]                      Room:        19  Gender:     F                            Technician: [id number]  :        [id number]                   Requested By:GEORGE NICHOLSON  Order #:    [id number]                    Reading MD:    Measurements  Intervals                                Axis  Rate:       66                           P:          31  ID:         [id number]                          QRS:        30  QRSD:       86                           T:          6  QT:         404  QTc:        424    Interpretive Statements  SINUS RHYTHM  BORDERLINE T ABNORMALITIES, ANTERIOR LEADS  Compared to ECG 2015 08:51:53  No significant changes          COURSE & MEDICAL DECISION MAKING  I have reviewed any medical record information, laboratory studies and radiographic results as noted above.  Differential diagnoses includes: Gastritis, PUD, pancreatitis, cholecystitis, hepatitis, ACS    Encounter Summary: This is a 77 y.o. female with now resolved upper abdominal pain, present for about 2 hours she reports, began one hour after eating, has since resolved. Decreased bowel movements for the past couple days which is abnormal. Not associated with any vomiting or nausea or diarrhea or back pain or chest pain. Looks well on exam, she does have chronic neurologic deficits from a prior CVA. Will check blood work and EKG and reevaluate. ------ blood work is unremarkable, the patient continues to feel well without any symptoms at this point I think she is stable and appropriate for discharge, I will prescribe a Fleet enema to help with her constipation and she has been given strict return instructions and will follow up with her primary care physician tomorrow       DISPOSITION: Discharged home in stable condition      FINAL IMPRESSION  1. Pain of upper abdomen    2. Constipation, unspecified constipation type           This dictation was created using voice recognition software. The accuracy of the dictation is limited to the abilities of the software. I expect there may be some errors of grammar and possibly content. The nursing notes were reviewed and certain aspects of this information were incorporated into this note.    Electronically signed by: Lucius Vergara, 8/4/2017 8:50 PM

## 2017-08-05 NOTE — ED NOTES
Patient dc'd with son via wheelchair/wheelchair taxi to group home. Patient alert on DC. Patient wheelchair bound. Son verbalizes understanding of DC instructions and prescription x 1.

## 2017-08-05 NOTE — ED NOTES
called to arrange transport home. Patient hx of CVA w/ left sided hemiplegia and no one is available to pick her up.

## 2017-08-05 NOTE — ED NOTES
Spoke w/ Dinorah from Social Work, states patients' son to come and accompany patient back to group home as we are unable to contact group home representative. Son to come to bedside, and then call for wheelchair cab to transport patient.

## 2017-08-05 NOTE — DISCHARGE INSTRUCTIONS
Abdominal Pain, Adult  Many things can cause abdominal pain. Usually, abdominal pain is not caused by a disease and will improve without treatment. It can often be observed and treated at home. Your health care provider will do a physical exam and possibly order blood tests and X-rays to help determine the seriousness of your pain. However, in many cases, more time must pass before a clear cause of the pain can be found. Before that point, your health care provider may not know if you need more testing or further treatment.  HOME CARE INSTRUCTIONS   Monitor your abdominal pain for any changes. The following actions may help to alleviate any discomfort you are experiencing:  · Only take over-the-counter or prescription medicines as directed by your health care provider.  · Do not take laxatives unless directed to do so by your health care provider.  · Try a clear liquid diet (broth, tea, or water) as directed by your health care provider. Slowly move to a bland diet as tolerated.  SEEK MEDICAL CARE IF:  · You have unexplained abdominal pain.  · You have abdominal pain associated with nausea or diarrhea.  · You have pain when you urinate or have a bowel movement.  · You experience abdominal pain that wakes you in the night.  · You have abdominal pain that is worsened or improved by eating food.  · You have abdominal pain that is worsened with eating fatty foods.  · You have a fever.  SEEK IMMEDIATE MEDICAL CARE IF:   · Your pain does not go away within 2 hours.  · You keep throwing up (vomiting).  · Your pain is felt only in portions of the abdomen, such as the right side or the left lower portion of the abdomen.  · You pass bloody or black tarry stools.  MAKE SURE YOU:  · Understand these instructions.    · Will watch your condition.    · Will get help right away if you are not doing well or get worse.       This information is not intended to replace advice given to you by your health care provider. Make sure you  discuss any questions you have with your health care provider.     Document Released: 09/27/2006 Document Revised: 01/08/2016 Document Reviewed: 08/27/2014  [employer] Interactive Patient Education ©2016 [employer]        Constipation, Adult  Constipation is when a person:  · Poops (has a bowel movement) less than 3 times a week.  · Has a hard time pooping.  · Has poop that is dry, hard, or bigger than normal.  HOME CARE   · Eat foods with a lot of fiber in them. This includes fruits, vegetables, beans, and whole grains such as brown rice.  · Avoid fatty foods and foods with a lot of sugar. This includes french fries, hamburgers, cookies, candy, and soda.  · If you are not getting enough fiber from food, take products with added fiber in them (supplements).  · Drink enough fluid to keep your pee (urine) clear or pale yellow.  · Exercise on a regular basis, or as told by your doctor.  · Go to the restroom when you feel like you need to poop. Do not hold it.  · Only take medicine as told by your doctor. Do not take medicines that help you poop (laxatives) without talking to your doctor first.  GET HELP RIGHT AWAY IF:   · You have bright red blood in your poop (stool).  · Your constipation lasts more than 4 days or gets worse.  · You have belly (abdominal) or butt (rectal) pain.  · You have thin poop (as thin as a pencil).  · You lose weight, and it cannot be explained.  MAKE SURE YOU:   · Understand these instructions.  · Will watch your condition.  · Will get help right away if you are not doing well or get worse.     This information is not intended to replace advice given to you by your health care provider. Make sure you discuss any questions you have with your health care provider.     Document Released: 06/05/2009 Document Revised: 01/08/2016 Document Reviewed: 09/29/2014  [employer] Patient Education ©2016 [employer] Inc.

## 2017-08-05 NOTE — DISCHARGE PLANNING
Medical Social Work:  IAN contaced to assist with Pt transport back to [facility]. Pt has a son who works upstairs he gave us contact info and states he cant get off work at this time to accompany her home. IAN contacted Manda at [phone]. She attempted to get assistance with transportation but was unable to. Pt son then able to cover his shift and accompanied Pt in cab back to her group home.  No other needs.

## 2017-08-10 ENCOUNTER — APPOINTMENT (OUTPATIENT)
Dept: RADIOLOGY | Facility: MEDICAL CENTER | Age: 77
End: 2017-08-10
Attending: EMERGENCY MEDICINE
Payer: MEDICARE

## 2017-08-10 ENCOUNTER — HOSPITAL ENCOUNTER (OUTPATIENT)
Facility: MEDICAL CENTER | Age: 77
End: 2017-08-10
Attending: EMERGENCY MEDICINE | Admitting: INTERNAL MEDICINE
Payer: MEDICARE

## 2017-08-10 ENCOUNTER — APPOINTMENT (OUTPATIENT)
Dept: RADIOLOGY | Facility: MEDICAL CENTER | Age: 77
End: 2017-08-10
Attending: INTERNAL MEDICINE
Payer: MEDICARE

## 2017-08-10 ENCOUNTER — RESOLUTE PROFESSIONAL BILLING HOSPITAL PROF FEE (OUTPATIENT)
Dept: HOSPITALIST | Facility: MEDICAL CENTER | Age: 77
End: 2017-08-10
Payer: MEDICARE

## 2017-08-10 VITALS
TEMPERATURE: 97 F | SYSTOLIC BLOOD PRESSURE: 130 MMHG | BODY MASS INDEX: 19.32 KG/M2 | DIASTOLIC BLOOD PRESSURE: 80 MMHG | RESPIRATION RATE: 16 BRPM | HEIGHT: 62 IN | HEART RATE: 88 BPM | WEIGHT: 105 LBS | OXYGEN SATURATION: 92 %

## 2017-08-10 DIAGNOSIS — R10.84 GENERALIZED ABDOMINAL PAIN: ICD-10-CM

## 2017-08-10 PROBLEM — E87.1 HYPONATREMIA: Status: ACTIVE | Noted: 2017-08-10

## 2017-08-10 PROBLEM — R74.8 ELEVATED LIVER ENZYMES: Status: ACTIVE | Noted: 2017-08-10

## 2017-08-10 PROBLEM — R10.9 ABDOMINAL PAIN: Status: ACTIVE | Noted: 2017-08-10

## 2017-08-10 PROBLEM — Z86.73 HISTORY OF STROKE: Status: ACTIVE | Noted: 2017-08-10

## 2017-08-10 LAB
ALBUMIN SERPL BCP-MCNC: 3.7 G/DL (ref 3.2–4.9)
ALBUMIN/GLOB SERPL: 1 G/DL
ALP SERPL-CCNC: 93 U/L (ref 30–99)
ALT SERPL-CCNC: 127 U/L (ref 2–50)
ANION GAP SERPL CALC-SCNC: 13 MMOL/L (ref 0–11.9)
ANISOCYTOSIS BLD QL SMEAR: ABNORMAL
AST SERPL-CCNC: 67 U/L (ref 12–45)
BASOPHILS # BLD AUTO: 0 % (ref 0–1.8)
BASOPHILS # BLD: 0 K/UL (ref 0–0.12)
BILIRUB SERPL-MCNC: 1.6 MG/DL (ref 0.1–1.5)
BUN SERPL-MCNC: 10 MG/DL (ref 8–22)
BURR CELLS BLD QL SMEAR: NORMAL
CALCIUM SERPL-MCNC: 8.6 MG/DL (ref 8.5–10.5)
CHLORIDE SERPL-SCNC: 105 MMOL/L (ref 96–112)
CO2 SERPL-SCNC: 16 MMOL/L (ref 20–33)
CREAT SERPL-MCNC: 0.62 MG/DL (ref 0.5–1.4)
EOSINOPHIL # BLD AUTO: 0 K/UL (ref 0–0.51)
EOSINOPHIL NFR BLD: 0 % (ref 0–6.9)
ERYTHROCYTE [DISTWIDTH] IN BLOOD BY AUTOMATED COUNT: 53.4 FL (ref 35.9–50)
GFR SERPL CREATININE-BSD FRML MDRD: >60 ML/MIN/1.73 M 2
GLOBULIN SER CALC-MCNC: 3.6 G/DL (ref 1.9–3.5)
GLUCOSE SERPL-MCNC: 87 MG/DL (ref 65–99)
HAV IGM SERPL QL IA: NEGATIVE
HBV CORE IGM SER QL: NEGATIVE
HBV SURFACE AG SER QL: NEGATIVE
HCT VFR BLD AUTO: 44.2 % (ref 37–47)
HCV AB SER QL: NEGATIVE
HGB BLD-MCNC: 14.4 G/DL (ref 12–16)
LACTATE BLD-SCNC: 1.1 MMOL/L (ref 0.5–2)
LIPASE SERPL-CCNC: 11 U/L (ref 11–82)
LYMPHOCYTES # BLD AUTO: 0.76 K/UL (ref 1–4.8)
LYMPHOCYTES NFR BLD: 10.1 % (ref 22–41)
MACROCYTES BLD QL SMEAR: ABNORMAL
MANUAL DIFF BLD: NORMAL
MCH RBC QN AUTO: 33.6 PG (ref 27–33)
MCHC RBC AUTO-ENTMCNC: 32.6 G/DL (ref 33.6–35)
MCV RBC AUTO: 103.3 FL (ref 81.4–97.8)
MONOCYTES # BLD AUTO: 0.2 K/UL (ref 0–0.85)
MONOCYTES NFR BLD AUTO: 2.7 % (ref 0–13.4)
MORPHOLOGY BLD-IMP: NORMAL
NEUTROPHILS # BLD AUTO: 6.54 K/UL (ref 2–7.15)
NEUTROPHILS NFR BLD: 87.2 % (ref 44–72)
NRBC # BLD AUTO: 0 K/UL
NRBC BLD AUTO-RTO: 0 /100 WBC
PLATELET # BLD AUTO: 116 K/UL (ref 164–446)
PMV BLD AUTO: 10.6 FL (ref 9–12.9)
POIKILOCYTOSIS BLD QL SMEAR: NORMAL
POTASSIUM SERPL-SCNC: 4 MMOL/L (ref 3.6–5.5)
PROT SERPL-MCNC: 7.3 G/DL (ref 6–8.2)
RBC # BLD AUTO: 4.28 M/UL (ref 4.2–5.4)
RBC BLD AUTO: PRESENT
SCHISTOCYTES BLD QL SMEAR: NORMAL
SODIUM SERPL-SCNC: 134 MMOL/L (ref 135–145)
TROPONIN I SERPL-MCNC: <0.01 NG/ML (ref 0–0.04)
WBC # BLD AUTO: 7.5 K/UL (ref 4.8–10.8)

## 2017-08-10 PROCEDURE — 36415 COLL VENOUS BLD VENIPUNCTURE: CPT

## 2017-08-10 PROCEDURE — 84484 ASSAY OF TROPONIN QUANT: CPT

## 2017-08-10 PROCEDURE — 96374 THER/PROPH/DIAG INJ IV PUSH: CPT | Mod: XU

## 2017-08-10 PROCEDURE — 700111 HCHG RX REV CODE 636 W/ 250 OVERRIDE (IP): Performed by: INTERNAL MEDICINE

## 2017-08-10 PROCEDURE — 94760 N-INVAS EAR/PLS OXIMETRY 1: CPT

## 2017-08-10 PROCEDURE — 99235 HOSP IP/OBS SAME DATE MOD 70: CPT | Performed by: INTERNAL MEDICINE

## 2017-08-10 PROCEDURE — 700105 HCHG RX REV CODE 258: Performed by: INTERNAL MEDICINE

## 2017-08-10 PROCEDURE — 700102 HCHG RX REV CODE 250 W/ 637 OVERRIDE(OP): Performed by: INTERNAL MEDICINE

## 2017-08-10 PROCEDURE — 80074 ACUTE HEPATITIS PANEL: CPT

## 2017-08-10 PROCEDURE — 80053 COMPREHEN METABOLIC PANEL: CPT

## 2017-08-10 PROCEDURE — 83690 ASSAY OF LIPASE: CPT

## 2017-08-10 PROCEDURE — 90670 PCV13 VACCINE IM: CPT | Performed by: INTERNAL MEDICINE

## 2017-08-10 PROCEDURE — 74177 CT ABD & PELVIS W/CONTRAST: CPT

## 2017-08-10 PROCEDURE — G0378 HOSPITAL OBSERVATION PER HR: HCPCS

## 2017-08-10 PROCEDURE — 85027 COMPLETE CBC AUTOMATED: CPT

## 2017-08-10 PROCEDURE — 99285 EMERGENCY DEPT VISIT HI MDM: CPT

## 2017-08-10 PROCEDURE — A9270 NON-COVERED ITEM OR SERVICE: HCPCS | Performed by: INTERNAL MEDICINE

## 2017-08-10 PROCEDURE — 90471 IMMUNIZATION ADMIN: CPT

## 2017-08-10 PROCEDURE — 96375 TX/PRO/DX INJ NEW DRUG ADDON: CPT

## 2017-08-10 PROCEDURE — 83605 ASSAY OF LACTIC ACID: CPT

## 2017-08-10 PROCEDURE — 76700 US EXAM ABDOM COMPLETE: CPT

## 2017-08-10 PROCEDURE — 700111 HCHG RX REV CODE 636 W/ 250 OVERRIDE (IP): Performed by: EMERGENCY MEDICINE

## 2017-08-10 PROCEDURE — 85007 BL SMEAR W/DIFF WBC COUNT: CPT

## 2017-08-10 RX ORDER — TRAZODONE HYDROCHLORIDE 50 MG/1
50 TABLET ORAL NIGHTLY
Status: DISCONTINUED | OUTPATIENT
Start: 2017-08-10 | End: 2017-08-10 | Stop reason: HOSPADM

## 2017-08-10 RX ORDER — ATENOLOL 50 MG/1
50 TABLET ORAL 2 TIMES DAILY
Status: DISCONTINUED | OUTPATIENT
Start: 2017-08-10 | End: 2017-08-10 | Stop reason: HOSPADM

## 2017-08-10 RX ORDER — MORPHINE SULFATE 4 MG/ML
3 INJECTION, SOLUTION INTRAMUSCULAR; INTRAVENOUS ONCE
Status: COMPLETED | OUTPATIENT
Start: 2017-08-10 | End: 2017-08-10

## 2017-08-10 RX ORDER — PRAVASTATIN SODIUM 40 MG
40 TABLET ORAL NIGHTLY
COMMUNITY

## 2017-08-10 RX ORDER — ONDANSETRON 4 MG/1
4 TABLET, ORALLY DISINTEGRATING ORAL EVERY 4 HOURS PRN
Status: DISCONTINUED | OUTPATIENT
Start: 2017-08-10 | End: 2017-08-10 | Stop reason: HOSPADM

## 2017-08-10 RX ORDER — SODIUM CHLORIDE 9 MG/ML
INJECTION, SOLUTION INTRAVENOUS CONTINUOUS
Status: DISCONTINUED | OUTPATIENT
Start: 2017-08-10 | End: 2017-08-10 | Stop reason: HOSPADM

## 2017-08-10 RX ORDER — OXYCODONE HYDROCHLORIDE 5 MG/1
5 TABLET ORAL EVERY 4 HOURS PRN
Status: DISCONTINUED | OUTPATIENT
Start: 2017-08-10 | End: 2017-08-10 | Stop reason: HOSPADM

## 2017-08-10 RX ORDER — ONDANSETRON 2 MG/ML
4 INJECTION INTRAMUSCULAR; INTRAVENOUS EVERY 4 HOURS PRN
Status: DISCONTINUED | OUTPATIENT
Start: 2017-08-10 | End: 2017-08-10 | Stop reason: HOSPADM

## 2017-08-10 RX ORDER — ACETAMINOPHEN 325 MG/1
650 TABLET ORAL EVERY 6 HOURS PRN
Status: DISCONTINUED | OUTPATIENT
Start: 2017-08-10 | End: 2017-08-10 | Stop reason: HOSPADM

## 2017-08-10 RX ORDER — POLYETHYLENE GLYCOL 3350 17 G/17G
1 POWDER, FOR SOLUTION ORAL
Status: DISCONTINUED | OUTPATIENT
Start: 2017-08-10 | End: 2017-08-10 | Stop reason: HOSPADM

## 2017-08-10 RX ORDER — AMOXICILLIN 250 MG
2 CAPSULE ORAL 2 TIMES DAILY
Status: DISCONTINUED | OUTPATIENT
Start: 2017-08-10 | End: 2017-08-10 | Stop reason: HOSPADM

## 2017-08-10 RX ORDER — LEVETIRACETAM 500 MG/1
500 TABLET ORAL 2 TIMES DAILY
Status: DISCONTINUED | OUTPATIENT
Start: 2017-08-10 | End: 2017-08-10 | Stop reason: HOSPADM

## 2017-08-10 RX ORDER — BISACODYL 10 MG
10 SUPPOSITORY, RECTAL RECTAL
Status: DISCONTINUED | OUTPATIENT
Start: 2017-08-10 | End: 2017-08-10 | Stop reason: HOSPADM

## 2017-08-10 RX ORDER — CLOPIDOGREL BISULFATE 75 MG/1
75 TABLET ORAL DAILY
Status: DISCONTINUED | OUTPATIENT
Start: 2017-08-10 | End: 2017-08-10 | Stop reason: HOSPADM

## 2017-08-10 RX ORDER — ONDANSETRON 4 MG/1
4 TABLET, ORALLY DISINTEGRATING ORAL EVERY 4 HOURS PRN
Qty: 20 TAB | Refills: 0 | Status: SHIPPED | OUTPATIENT
Start: 2017-08-10 | End: 2018-01-14

## 2017-08-10 RX ORDER — HYDROCODONE BITARTRATE AND ACETAMINOPHEN 5; 325 MG/1; MG/1
1 TABLET ORAL EVERY 8 HOURS PRN
COMMUNITY
End: 2018-01-14

## 2017-08-10 RX ORDER — PRAVASTATIN SODIUM 20 MG
40 TABLET ORAL DAILY
Status: DISCONTINUED | OUTPATIENT
Start: 2017-08-10 | End: 2017-08-10 | Stop reason: HOSPADM

## 2017-08-10 RX ORDER — AMOXICILLIN 250 MG
1 CAPSULE ORAL DAILY
COMMUNITY
End: 2018-01-14

## 2017-08-10 RX ORDER — HEPARIN SODIUM 5000 [USP'U]/ML
5000 INJECTION, SOLUTION INTRAVENOUS; SUBCUTANEOUS EVERY 8 HOURS
Status: DISCONTINUED | OUTPATIENT
Start: 2017-08-10 | End: 2017-08-10 | Stop reason: HOSPADM

## 2017-08-10 RX ORDER — LANOLIN ALCOHOL/MO/W.PET/CERES
3 CREAM (GRAM) TOPICAL
COMMUNITY

## 2017-08-10 RX ORDER — ONDANSETRON 2 MG/ML
4 INJECTION INTRAMUSCULAR; INTRAVENOUS ONCE
Status: COMPLETED | OUTPATIENT
Start: 2017-08-10 | End: 2017-08-10

## 2017-08-10 RX ADMIN — SODIUM CHLORIDE: 9 INJECTION, SOLUTION INTRAVENOUS at 05:40

## 2017-08-10 RX ADMIN — PNEUMOCOCCAL 13-VALENT CONJUGATE VACCINE 0.5 ML: 2.2; 2.2; 2.2; 2.2; 2.2; 4.4; 2.2; 2.2; 2.2; 2.2; 2.2; 2.2; 2.2 INJECTION, SUSPENSION INTRAMUSCULAR at 15:04

## 2017-08-10 RX ADMIN — HEPARIN SODIUM 5000 UNITS: 5000 INJECTION, SOLUTION INTRAVENOUS; SUBCUTANEOUS at 05:37

## 2017-08-10 RX ADMIN — ONDANSETRON 4 MG: 2 INJECTION INTRAMUSCULAR; INTRAVENOUS at 02:12

## 2017-08-10 RX ADMIN — ATENOLOL 50 MG: 50 TABLET ORAL at 09:50

## 2017-08-10 RX ADMIN — STANDARDIZED SENNA CONCENTRATE AND DOCUSATE SODIUM 2 TABLET: 8.6; 5 TABLET, FILM COATED ORAL at 09:50

## 2017-08-10 RX ADMIN — MORPHINE SULFATE 3 MG: 4 INJECTION INTRAVENOUS at 02:11

## 2017-08-10 RX ADMIN — LEVETIRACETAM 500 MG: 500 TABLET, FILM COATED ORAL at 09:50

## 2017-08-10 ASSESSMENT — ENCOUNTER SYMPTOMS
ABDOMINAL PAIN: 1
HEADACHES: 0
NECK PAIN: 0
PALPITATIONS: 0
FEVER: 0
FOCAL WEAKNESS: 0
ORTHOPNEA: 0
SEIZURES: 0
STRIDOR: 0
WEIGHT LOSS: 0
VOMITING: 0
MYALGIAS: 0
BLURRED VISION: 0
INSOMNIA: 0
DIARRHEA: 0
SHORTNESS OF BREATH: 0
DIZZINESS: 0
NERVOUS/ANXIOUS: 0
SPUTUM PRODUCTION: 0
COUGH: 0
BACK PAIN: 0
EYE REDNESS: 0
EYE PAIN: 0
CHILLS: 0
NAUSEA: 0
HEARTBURN: 0
DEPRESSION: 0
EYE DISCHARGE: 0

## 2017-08-10 ASSESSMENT — PAIN SCALES - GENERAL
PAINLEVEL_OUTOF10: 1
PAINLEVEL_OUTOF10: 0
PAINLEVEL_OUTOF10: 6

## 2017-08-10 ASSESSMENT — LIFESTYLE VARIABLES
DO YOU DRINK ALCOHOL: NO
EVER_SMOKED: YES

## 2017-08-10 NOTE — PROGRESS NOTES
Bedside report received.  Assessment complete.  A&O x 4. Patient calls appropriately.  Denies numbness and tingling. Moves all extremities, has residual left sided weakness from past hx of stroke.   Patient up with 2 assist. Bed alarm in place.   Patient has 1/10 pain. Declines intervention.  Denies N&V. Pt diet just changed from NPO status.  Pt is incontinent of stool and urine.  Patient denies SOB.  Review plan with of care with patient. Call light and personal belongings with in reach. Hourly rounding in place. All needs met at this time.

## 2017-08-10 NOTE — PROGRESS NOTES
Went over d/c instructions with pt and son.  Follow up appt to see primary care physician.  Medications reviewed with patient and son.  Prescriptions and copy of d/c instructions given to pt.  Pt had no further questions.  Pt discharged by wheel chair at 1635

## 2017-08-10 NOTE — ED PROVIDER NOTES
"ED Provider Note    CHIEF COMPLAINT  Chief Complaint   Patient presents with   • Abdominal Pain     Per EMS, pt has been having upper quadrant pain x1 day. Pt states last BM was over 2 days ago and states BM was normal. Pt denies any N/V.        HPI  Malena Milton is a 77 y.o. female here for evaluation of abdominal pain.  The pt states her abdominal pain started about 10 pm this evening.  She has no fever, no vomiting, and denies cp, sob.  In addition, she has no radiation of the pain to the back.  She states she felt like this last week, and was seen, but does not know the results.     PAST MEDICAL HISTORY   has a past medical history of Hypertension; Stroke (CMS-HCC) (06/2013); and [place] (ce[place]) [place]    SOCIAL HISTORY  Social History     Social History Main Topics   • Smoking status: Former Smoker   • Smokeless tobacco: Not on file   • Alcohol Use: No   • Drug Use: No   • Sexual Activity: Not on file       SURGICAL HISTORY  patient denies any surgical history    CURRENT MEDICATIONS  Home Medications     Reviewed by Emeka Sanchez R.N. (Registered Nurse) on 08/10/17 at 0047  Med List Status: <None>    Medication Last Dose Status    acetaminophen (TYLENOL) 500 MG TABS  Active    alendronate (FOSAMAX) 10 MG TABS  Active    atenolol (TENORMIN) 50 MG TABS 5/28/2015 Active    clopidogrel (PLAVIX) 75 MG TABS 5/28/2015 Active    levetiracetam (KEPPRA) 500 MG TABS 5/28/2015 Active    nitrofurantoin monohydr macro (MACROBID) 100 MG CAPS  Active    pravastatin (PRAVACHOL) 20 MG TABS 5/28/2015 Active    trazodone (DESYREL) 50 MG TABS 5/27/2015 Active    vitamin D (CHOLECALCIFEROL) 1000 UNIT TABS 5/28/2015 Active                ALLERGIES  No Known Allergies    REVIEW OF SYSTEMS  See HPI for further details. Review of systems as above, otherwise all other systems are negative.     PHYSICAL EXAM  VITAL SIGNS: /78 mmHg  Pulse 90  Temp(Src) 37.6 °C (99.7 °F)  Resp 18  Ht 1.575 m (5' 2\")  " Wt 47.628 kg (105 lb)  BMI 19.20 kg/m2  SpO2 95%    Constitutional: Well appearing patient.  Not toxic, nor ill in appearance.  HEENT: NC/AT.  Extra Ocular Muscles Intact. Posterior pharynx clear, and without exudate. No uvula edema.  Neck: Full range of motion; non tender.   Cardiovascular: Regular heart rate and rhythm.  No murmurs, rubs, nor gallop appreciated.   Back:  Non tender midline.  No obvious step off or deformity.  Thorax & Lungs: Lungs are clear to auscultation with good air movement bilaterally.  No wheeze, rhonchi, nor rales.   Abdomen: Soft, with mild epigastric tenderness, no rebound nor guarding.  No mass, pulsatile mass, nor hepatosplenomegaly appreciated.  Skin: No purpura nor petechia noted.  No rash.  Extremities/Musculoskeletal: No sign of trauma.    Musculoskeletal: Range of motion is intact in all major joints.  Neurologic: Alert & oriented x 3.  CN II-XII grossly intact.   Strength and sensation is intact. Clear speech, appropriate, cooperative.  Psychiatric: Normal affect appropriate for the clinical situation.    Results for orders placed or performed during the hospital encounter of 08/10/17   COMP METABOLIC PANEL   Result Value Ref Range    Sodium 134 (L) 135 - 145 mmol/L    Potassium 4.0 3.6 - 5.5 mmol/L    Chloride 105 96 - 112 mmol/L    Co2 16 (L) 20 - 33 mmol/L    Anion Gap 13.0 (H) 0.0 - 11.9    Glucose 87 65 - 99 mg/dL    Bun 10 8 - 22 mg/dL    Creatinine 0.62 0.50 - 1.40 mg/dL    Calcium 8.6 8.5 - 10.5 mg/dL    AST(SGOT) 67 (H) 12 - 45 U/L    ALT(SGPT) 127 (H) 2 - 50 U/L    Alkaline Phosphatase 93 30 - 99 U/L    Total Bilirubin 1.6 (H) 0.1 - 1.5 mg/dL    Albumin 3.7 3.2 - 4.9 g/dL    Total Protein 7.3 6.0 - 8.2 g/dL    Globulin 3.6 (H) 1.9 - 3.5 g/dL    A-G Ratio 1.0 g/dL   LIPASE   Result Value Ref Range    Lipase 11 11 - 82 U/L   ESTIMATED GFR   Result Value Ref Range    GFR If African American >60 >60 mL/min/1.73 m 2    GFR If Non African American >60 >60 mL/min/1.73 m 2      CT-ABDOMEN-PELVIS WITH   Final Result      1.  No acute intra-abdominal findings      2.  Moderate amount of colonic stool.        PROCEDURES     MEDICAL RECORD  I have reviewed patient's medical record and pertinent results are listed above.    COURSE & MEDICAL DECISION MAKING  I have reviewed any medical record information, laboratory studies and radiographic results as noted above.    3:30 AM  The pt will be admitted to Dr. Negrete, and we will order a gb u/s.   Pt is comfortable at this time.       FINAL IMPRESSION  1. Generalized abdominal pain            Electronically signed by: Marcial Borrero, 8/10/2017 1:35 AM

## 2017-08-10 NOTE — DISCHARGE INSTRUCTIONS
Discharge Instructions    Discharged to group home by car with relative. Discharged via wheelchair, hospital escort: Yes.  Special equipment needed: Wheelchair    Be sure to schedule a follow-up appointment with your primary care doctor or any specialists as instructed.     Discharge Plan:   Influenza Vaccine Indication: Indicated: Not available from distributor/    I understand that a diet low in cholesterol, fat, and sodium is recommended for good health. Unless I have been given specific instructions below for another diet, I accept this instruction as my diet prescription.   Other diet: [place] diet    Special Instructions: See Primary care physician in 1 week.  [place] diet.    · Is patient discharged on Warfarin / Coumadin?   No     · Is patient Post Blood Transfusion?  No    Depression / Suicide Risk    As [facility] are [facility], it is important to learn how to keep safe from harming yourself.    Recognize the warning signs:  · Abrupt changes in personality, positive or negative- including increase in energy   · Giving away possessions  · Change in eating patterns- significant weight changes-  positive or negative  · Change in sleeping patterns- unable to sleep or sleeping all the time   · Unwillingness or inability to communicate  · Depression  · Unusual sadness, discouragement and loneliness  · Talk of wanting to die  · Neglect of personal appearance   · Rebelliousness- reckless behavior  · Withdrawal from people/activities they love  · Confusion- inability to concentrate     If you or a loved one observes any of these behaviors or has concerns about self-harm, here's what you can do:  · Talk about it- your feelings and reasons for harming yourself    Abdominal Pain, Adult  Many things can cause belly (abdominal) pain. Most times, the belly pain is not dangerous. Many cases of belly pain can be watched and treated at home.  HOME CARE   · Do not take medicines that help  you go poop (laxatives) unless told to by your doctor.  · Only take medicine as told by your doctor.  · Eat or drink as told by your doctor. Your doctor will tell you if you should be on a special diet.  GET HELP IF:  · You do not know what is causing your belly pain.  · You have belly pain while you are sick to your stomach (nauseous) or have runny poop (diarrhea).  · You have pain while you pee or poop.  · Your belly pain wakes you up at night.  · You have belly pain that gets worse or better when you eat.  · You have belly pain that gets worse when you eat fatty foods.  · You have a fever.  GET HELP RIGHT AWAY IF:   · The pain does not go away within 2 hours.  · You keep throwing up (vomiting).  · The pain changes and is only in the right or left part of the belly.  · You have bloody or tarry looking poop.  MAKE SURE YOU:   · Understand these instructions.  · Will watch your condition.  · Will get help right away if you are not doing well or get worse.     This information is not intended to replace advice given to you by your health care provider. Make sure you discuss any questions you have with your health care provider.     Document Released: 06/05/2009 Document Revised: 01/08/2016 Document Reviewed: 08/27/2014  [employer] Interactive Patient Education ©2016 [employer]    · Remove any means that you might use to hurt yourself (examples: pills, rope, extension cords, firearm)  · Get professional help from the community (Mental Health, Substance Abuse, psychological counseling)  · Do not be alone:Call your Safe Contact- someone whom you trust who will be there for you.  · Call your local CRISIS HOTLINE 140-9127 or 537-085-6741  · Call your local Children's Mobile Crisis Response Team [place] (113) 788-3113 or www.[employer]ivity  · Call the toll free National Suicide Prevention Hotlines   · National Suicide Prevention Lifeline 707-021-PXNS (1510)  · National [employer] Network 800-ELYOOYW  (640-0738)

## 2017-08-10 NOTE — DISCHARGE PLANNING
"IAN notified by bedside nurse that pt may need transportation home.  SW called pt's son who was at bedside to see if he will be transporting pt home.  Son is available to take pt home but will be talking pt home with him because he says \"he needs to work some things out\" at [facility].     Pt is a&o x4.  SW met with son and pt at bedside to see if pt is agreeable to go home with son.  Pt is agreeable and son says she will return to [facility] tomorrow.  Son was concerned after pt reported that she asked for hot water last night and pt was denied that request by group home staff.  Son would like to speak to staff at [facility] to ensure pt's request are met, but family overall is happy with group home.    "

## 2017-08-10 NOTE — CARE PLAN
Problem: Safety  Goal: Will remain free from injury  Outcome: PROGRESSING AS EXPECTED  Pt calls appropriately. Waits for staff to assisst.     Problem: Skin Integrity  Goal: Risk for impaired skin integrity will decrease  Outcome: PROGRESSING AS EXPECTED  Pt is asked and looked at, at least every two hours for incontinence. Pt is encouraged and helped to turn to one side every two hours.

## 2017-08-10 NOTE — DISCHARGE SUMMARY
CHIEF COMPLAINT ON ADMISSION  Chief Complaint   Patient presents with   • Abdominal Pain     Per EMS, pt has been having upper quadrant pain x1 day. Pt states last BM was over 2 days ago and states BM was normal. Pt denies any N/V.        CODE STATUS  Full Code    HPI & HOSPITAL COURSE  This is a 77 y.o. female here with abdominal pain described as dull aching, occasionally radiated to her back, constant in nature. Patient denied any nausea vomiting or diarrhea. The pain has resolved without obvious cause.  CT scan of the abdomen done and it was negative, US abdomen was negative, hepatitis panel and lipase are negative. She feels better now, no recurrence, tolerating her diet.      Therefore, she is discharged in good and stable condition with close outpatient follow-up.    SPECIFIC OUTPATIENT FOLLOW-UP  PMD this week    DISCHARGE PROBLEM LIST  Active Problems:    Abdominal pain POA: Yes    Hyponatremia POA: Yes    Elevated liver enzymes POA: Yes    History of stroke POA: Unknown  Resolved Problems:    * No resolved hospital problems. *      FOLLOW UP  No future appointments.  No follow-up provider specified.    MEDICATIONS ON DISCHARGE   Malena Milton   Home Medication Instructions ANABELLE:[id number]    Printed on:08/10/17 1148   Medication Information                      atenolol (TENORMIN) 50 MG TABS  Take 50 mg by mouth every day.             clopidogrel (PLAVIX) 75 MG TABS  Take 75 mg by mouth every day.             hydrocodone-acetaminophen (NORCO) 5-325 MG Tab per tablet  Take 1 Tab by mouth every 8 hours as needed.             levetiracetam (KEPPRA) 500 MG TABS  Take 500 mg by mouth 2 times a day.             magnesium hydroxide (MILK OF MAGNESIA) 400 MG/5ML Suspension  Take 30 mL by mouth 1 time daily as needed (for constipation).             Non Formulary Request  Take 3 mg by mouth every bedtime. Indications: Sleep             ondansetron (ZOFRAN ODT) 4 MG TABLET DISPERSIBLE  Take 1 Tab by mouth every four  hours as needed for Nausea/Vomiting (give PO if IV route is unavailable. May give per feeding tube.).             pravastatin (PRAVACHOL) 20 MG TABS  Take 40 mg by mouth every bedtime.             senna-docusate (PERICOLACE OR SENOKOT S) 8.6-50 MG Tab  Take 1 Tab by mouth every day.             trazodone (DESYREL) 50 MG TABS  Take 100 mg by mouth at bedtime as needed.                 DIET  Orders Placed This Encounter   Procedures   • DIET ORDER     Standing Status: Standing      Number of Occurrences: 1      Standing Expiration Date:      Order Specific Question:  Diet:     Answer:  Regular [1]     Order Specific Question:  Texture/Fiber modifications:     Answer:  Dysphagia 3(Mechanical Soft)specify fluid consistency(question 6) [3]     Order Specific Question:  Consistency/Fluid modifications:     Answer:  Nectar Thick [2]       ACTIVITY  As tolerated.  Weight bearing as tolerated      CONSULTATIONS  None    PROCEDURES  US abdomen  Heterogeneous appearance of the liver and spleen.    Mild gallbladder wall thickening without gallstones identified.    No biliary ductal dilatation.    Limited evaluation of the pancreas which is obscured by overlying bowel gas.    Mild right pelviectasis versus parapelvic cyst.    CT abd/pelvis with    1.  No acute intra-abdominal findings    2.  Moderate amount of colonic stool.    LABORATORY  Lab Results   Component Value Date/Time    SODIUM 134* 08/10/2017 02:10 AM    POTASSIUM 4.0 08/10/2017 02:10 AM    CHLORIDE 105 08/10/2017 02:10 AM    CO2 16* 08/10/2017 02:10 AM    GLUCOSE 87 08/10/2017 02:10 AM    BUN 10 08/10/2017 02:10 AM    CREATININE 0.62 08/10/2017 02:10 AM        Lab Results   Component Value Date/Time    WBC 7.5 08/10/2017 02:10 AM    HEMOGLOBIN 14.4 08/10/2017 02:10 AM    HEMATOCRIT 44.2 08/10/2017 02:10 AM    PLATELET COUNT 116* 08/10/2017 02:10 AM        Total time of the discharge process exceeds 36 minutes

## 2017-08-10 NOTE — IP AVS SNAPSHOT
" <p align=\"LEFT\"><IMG SRC=\"//[email or web address]\" alt=\"Image\" WIDTH=\"50%\" HEIGHT=\"200\" BORDER=\"\"></p>                   Name:Malena Milton  Medical Record Number:[id number]  CSN: [id number]    YOB: 1940   Age: 77 y.o.  Sex: female  HT:1.575 m (5' 2\") WT: 47.628 kg (105 lb)          Admit Date: 8/10/2017     Discharge Date:   Today's Date: 8/10/2017  Attending Doctor:  Timothy Negrete M.D.                  Allergies:  Review of patient's allergies indicates no known allergies.          Follow-up Information     1. Follow up with JOSE ELIAS Becerra In 1 week.    Specialty:  Family Medicine    Contact information    [address]  [address]  [facility] [id number] [phone]           Medication List      Take these Medications        Instructions    atenolol 50 MG Tabs   Commonly known as:  TENORMIN    Take 50 mg by mouth every day.   Dose:  50 mg       clopidogrel 75 MG Tabs   Commonly known as:  PLAVIX    Take 75 mg by mouth every day.   Dose:  75 mg       hydrocodone-acetaminophen 5-325 MG Tabs per tablet   Commonly known as:  NORCO    Take 1 Tab by mouth every 8 hours as needed.   Dose:  1 Tab       levetiracetam 500 MG Tabs   Commonly known as:  KEPPRA    Take 500 mg by mouth 2 times a day.   Dose:  500 mg       magnesium hydroxide 400 MG/5ML Susp   Commonly known as:  MILK OF MAGNESIA    Take 30 mL by mouth 1 time daily as needed (for constipation).   Dose:  30 mL       Non Formulary Request    Take 3 mg by mouth every bedtime. Indications: Sleep   Dose:  3 mg       ondansetron 4 MG Tbdp   Commonly known as:  ZOFRAN ODT    Take 1 Tab by mouth every four hours as needed for Nausea/Vomiting (give PO if IV route is unavailable. May give per feeding tube.).   Dose:  4 mg       pravastatin 20 MG Tabs   Commonly known as:  PRAVACHOL    Take 40 mg by mouth every bedtime.   Dose:  40 mg       senna-docusate 8.6-50 MG Tabs   Commonly known as:  PERICOLACE or SENOKOT S    Take 1 Tab by mouth every day.   Dose:  1 " Tab       trazodone 50 MG Tabs   Commonly known as:  DESYREL    Take 100 mg by mouth at bedtime as needed.   Dose:  100 mg

## 2017-08-10 NOTE — ED NOTES
Pt bib EMS.   Chief Complaint   Patient presents with   • Abdominal Pain     Per EMS, pt has been having upper quadrant pain x1 day. Pt states last BM was over 2 days ago and states BM was normal. Pt denies any N/V.      Pt changed into gown and placed on monitor.   Chart up and ready for ERP now.

## 2017-08-10 NOTE — IP AVS SNAPSHOT
[email or web address] Access Code: Activation code not generated  Current [email or web address] Status: Patient Declined    Your email address is not on file at [facility].  Email Addresses are required for you to sign up for [email or web address], please contact [phone] to verify your personal information and to provide your email address prior to attempting to register for [email or web address].    Malena Milton  [address]  [place] [address]    HubNamit  A secure, online tool to manage your health information     [facility]’s Jiuxian.com® is a secure, online tool that connects you to your personalized health information from the privacy of your home -- day or night - making it very easy for you to manage your healthcare. Once the activation process is completed, you can even access your medical information using the Jiuxian.com oracio, which is available for free in the Apple Oracio store or Google Play store.     To learn more about [email or web address], visit [email or web address]    There are two levels of access available (as shown below):   My Chart Features  [facility] Primary Care Doctor [facility]  Specialists [facility]  Urgent  Care Non-[facility] Primary Care Doctor   Email your healthcare team securely and privately 24/7 X X X    Manage appointments: schedule your next appointment; view details of past/upcoming appointments X      Request prescription refills. X      View recent personal medical records, including lab and immunizations X X X X   View health record, including health history, allergies, medications X X X X   Read reports about your outpatient visits, procedures, consult and ER notes X X X X   See your discharge summary, which is a recap of your hospital and/or ER visit that includes your diagnosis, lab results, and care plan X X  X     How to register for HubNamit:  Once your e-mail address has been verified, follow the following steps to sign up for HubNamit.     1. Go to  [email or web address]  2. Click on the Sign Up Now box, which takes you to the New Member  Sign Up page. You will need to provide the following information:  a. Enter your Newsvine Access Code exactly as it appears at the top of this page. (You will not need to use this code after you’ve completed the sign-up process. If you do not sign up before the expiration date, you must request a new code.)   b. Enter your date of birth.   c. Enter your home email address.   d. Click Submit, and follow the next screen’s instructions.  3. Create a Newsvine ID. This will be your Newsvine login ID and cannot be changed, so think of one that is secure and easy to remember.  4. Create a Newsvine password. You can change your password at any time.  5. Enter your Password Reset Question and Answer. This can be used at a later time if you forget your password.   6. Enter your e-mail address. This allows you to receive e-mail notifications when new information is available in Newsvine.  7. Click Sign Up. You can now view your health information.    For assistance activating your Newsvine account, call [phone]

## 2017-08-10 NOTE — IP AVS SNAPSHOT
" Home Care Instructions                                                                                                                  Name:Malena Milton  Medical Record Number:[id number]  CSN: [id number]    YOB: 1940   Age: 77 y.o.  Sex: female  HT:1.575 m (5' 2\") WT: 47.628 kg (105 lb)          Admit Date: 8/10/2017     Discharge Date:   Today's Date: 8/10/2017  Attending Doctor:  Timothy Negrete M.D.                  Allergies:  Review of patient's allergies indicates no known allergies.            Discharge Instructions       Discharge Instructions    Discharged to group home by car with relative. Discharged via wheelchair, hospital escort: Yes.  Special equipment needed: Wheelchair    Be sure to schedule a follow-up appointment with your primary care doctor or any specialists as instructed.     Discharge Plan:   Influenza Vaccine Indication: Indicated: Not available from distributor/    I understand that a diet low in cholesterol, fat, and sodium is recommended for good health. Unless I have been given specific instructions below for another diet, I accept this instruction as my diet prescription.   Other diet: Elsie diet    Special Instructions: See Primary care physician in 1 week.  Elsie diet.    · Is patient discharged on Warfarin / Coumadin?   No     · Is patient Post Blood Transfusion?  No    Depression / Suicide Risk    As you are discharged from this [facility] facility, it is important to learn how to keep safe from harming yourself.    Recognize the warning signs:  · Abrupt changes in personality, positive or negative- including increase in energy   · Giving away possessions  · Change in eating patterns- significant weight changes-  positive or negative  · Change in sleeping patterns- unable to sleep or sleeping all the time   · Unwillingness or inability to communicate  · Depression  · Unusual sadness, discouragement and loneliness  · Talk of wanting to die  · Neglect of personal " appearance   · Rebelliousness- reckless behavior  · Withdrawal from people/activities they love  · Confusion- inability to concentrate     If you or a loved one observes any of these behaviors or has concerns about self-harm, here's what you can do:  · Talk about it- your feelings and reasons for harming yourself    Abdominal Pain, Adult  Many things can cause belly (abdominal) pain. Most times, the belly pain is not dangerous. Many cases of belly pain can be watched and treated at home.  HOME CARE   · Do not take medicines that help you go poop (laxatives) unless told to by your doctor.  · Only take medicine as told by your doctor.  · Eat or drink as told by your doctor. Your doctor will tell you if you should be on a special diet.  GET HELP IF:  · You do not know what is causing your belly pain.  · You have belly pain while you are sick to your stomach (nauseous) or have runny poop (diarrhea).  · You have pain while you pee or poop.  · Your belly pain wakes you up at night.  · You have belly pain that gets worse or better when you eat.  · You have belly pain that gets worse when you eat fatty foods.  · You have a fever.  GET HELP RIGHT AWAY IF:   · The pain does not go away within 2 hours.  · You keep throwing up (vomiting).  · The pain changes and is only in the right or left part of the belly.  · You have bloody or tarry looking poop.  MAKE SURE YOU:   · Understand these instructions.  · Will watch your condition.  · Will get help right away if you are not doing well or get worse.     This information is not intended to replace advice given to you by your health care provider. Make sure you discuss any questions you have with your health care provider.     Document Released: 06/05/2009 Document Revised: 01/08/2016 Document Reviewed: 08/27/2014  [employer] Interactive Patient Education ©2016 [employer]    · Remove any means that you might use to hurt yourself (examples: pills, rope, extension cords,  firearm)  · Get professional help from the community (Mental Health, Substance Abuse, psychological counseling)  · Do not be alone:Call your Safe Contact- someone whom you trust who will be there for you.  · Call your local CRISIS HOTLINE 578-1567 or 183-644-7332  · Call your local Children's Mobile Crisis Response Team [place] (191) 634-5944 or www.[employer]  · Call the toll free National Suicide Prevention Hotlines   · National Suicide Prevention Lifeline 339-907-RBVI (9145)  · [employer] Hope Line Network 800-SUICIDE (405-4799)        Follow-up Information     1. Follow up with JOSE ELIAS Becerra In 1 week.    Specialty:  Family Medicine    Contact information    [address]  [address]  [facility] [id number] [phone]           Discharge Medication Instructions:    Below are the medications your physician expects you to take upon discharge:    Review all your home medications and newly ordered medications with your doctor and/or pharmacist. Follow medication instructions as directed by your doctor and/or pharmacist.    Please keep your medication list with you and share with your physician.               Medication List      START taking these medications        Instructions    Morning Afternoon Evening Bedtime    ondansetron 4 MG Tbdp   Commonly known as:  ZOFRAN ODT        Take 1 Tab by mouth every four hours as needed for Nausea/Vomiting (give PO if IV route is unavailable. May give per feeding tube.).   Dose:  4 mg                          CONTINUE taking these medications        Instructions    Morning Afternoon Evening Bedtime    atenolol 50 MG Tabs   Last time this was given:  50 mg on 8/10/2017  9:50 AM   Commonly known as:  TENORMIN        Take 50 mg by mouth every day.   Dose:  50 mg                        clopidogrel 75 MG Tabs   Commonly known as:  PLAVIX        Take 75 mg by mouth every day.   Dose:  75 mg                        hydrocodone-acetaminophen 5-325 MG Tabs per tablet   Commonly  known as:  NORCO        Take 1 Tab by mouth every 8 hours as needed.   Dose:  1 Tab                        levetiracetam 500 MG Tabs   Last time this was given:  500 mg on 8/10/2017  9:50 AM   Commonly known as:  KEPPRA        Take 500 mg by mouth 2 times a day.   Dose:  500 mg                        magnesium hydroxide 400 MG/5ML Susp   Commonly known as:  MILK OF MAGNESIA        Take 30 mL by mouth 1 time daily as needed (for constipation).   Dose:  30 mL                        Non Formulary Request        Take 3 mg by mouth every bedtime. Indications: Sleep   Dose:  3 mg                        pravastatin 20 MG Tabs   Commonly known as:  PRAVACHOL        Take 40 mg by mouth every bedtime.   Dose:  40 mg                        senna-docusate 8.6-50 MG Tabs   Last time this was given:  2 Tabs on 8/10/2017  9:50 AM   Commonly known as:  PERICOLACE or SENOKOT S        Take 1 Tab by mouth every day.   Dose:  1 Tab                        trazodone 50 MG Tabs   Commonly known as:  DESYREL        Take 100 mg by mouth at bedtime as needed.   Dose:  100 mg                          STOP taking these medications     acetaminophen 500 MG Tabs   Commonly known as:  TYLENOL               alendronate 10 MG Tabs   Commonly known as:  FOSAMAX               nitrofurantoin monohydr macro 100 MG Caps   Commonly known as:  MACROBID               vitamin D 1000 UNIT Tabs   Commonly known as:  cholecalciferol                    Where to Get Your Medications      Information about where to get these medications is not yet available     ! Ask your nurse or doctor about these medications    - ondansetron 4 MG Tbdp            Instructions           Diet / Nutrition:    Follow any diet instructions given to you by your doctor or the dietician, including how much salt (sodium) you are allowed each day.    If you are overweight, talk to your doctor about a weight reduction plan.    Activity:    Remain physically active following your doctor's  instructions about exercise and activity.    Rest often.     Any time you become even a little tired or short of breath, SIT DOWN and rest.    Worsening Symptoms:    Report any of the following signs and symptoms to the doctor's office immediately:    *Pain of jaw, arm, or neck  *Chest pain not relieved by medication                               *Dizziness or loss of consciousness  *Difficulty breathing even when at rest   *More tired than usual                                       *Bleeding drainage or swelling of surgical site  *Swelling of feet, ankles, legs or stomach                 *Fever (>100ºF)  *Pink or blood tinged sputum  *Weight gain (3lbs/day or 5lbs /week)           *Shock from internal defibrillator (if applicable)  *Palpitations or irregular heartbeats                *Cool and/or numb extremities    Stroke Awareness    Common Risk Factors for Stroke include:    Age  Atrial Fibrillation  Carotid Artery Stenosis  Diabetes Mellitus  Excessive alcohol consumption  High blood pressure  Overweight   Physical inactivity  Smoking    Warning signs and symptoms of a stroke include:    *Sudden numbness or weakness of the face, arm or leg (especially on one side of the body).  *Sudden confusion, trouble speaking or understanding.  *Sudden trouble seeing in one or both eyes.  *Sudden trouble walking, dizziness, loss of balance or coordination.Sudden severe headache with no known cause.    It is very important to get treatment quickly when a stroke occurs. If you experience any of the above warning signs, call 911 immediately.                   Disclaimer         Quit Smoking / Tobacco Use:    I understand the use of any tobacco products increases my chance of suffering from future heart disease or stroke and could cause other illnesses which may shorten my life. Quitting the use of tobacco products is the single most important thing I can do to improve my health. For further information on smoking / tobacco  cessation call a Toll Free Quit Line at 1-517.129.7771 (*National Cancer [place]) or 1-927.968.3387 (American Lung Association) or you can access the web based program at www.lungusa.org.    [place] Tobacco Users Help Line:  (696) 837-8595       Toll Free: 1-717.587.2315  Quit Tobacco Program [facility] (063)563-3882    Crisis Hotline:    [place] Crisis Hotline:  8-888-DSJDBBJ or 1-884.895.2160    [place] Crisis Hotline:    1-839.118.4477 or 050-764-1729    Discharge Survey:   Thank you for choosing [facility]. We hope we did everything we could to make your hospital stay a pleasant one. You may be receiving a phone survey and we would appreciate your time and participation in answering the questions. Your input is very valuable to us in our efforts to improve our service to our patients and their families.        My signature on this form indicates that:    1. I have reviewed and understand the above information.  2. My questions regarding this information have been answered to my satisfaction.  3. I have formulated a plan with my discharge nurse to obtain my prescribed medications for home.                  Disclaimer         __________________________________                     __________       ________                       Patient Signature                                                 Date                    Time

## 2017-08-10 NOTE — IP AVS SNAPSHOT
8/10/2017    Malena Milton  [address]  [place] [address]    Dear Malena:    [facility] wants to ensure your discharge home is safe and you or your loved ones have had all of your questions answered regarding your care after you leave the hospital.    Below is a list of resources and contact information should you have any questions regarding your hospital stay, follow-up instructions, or active medical symptoms.    Questions or Concerns Regarding… Contact   Medical Questions Related to Your Discharge  (7 days a week, 8am-5pm) Contact a Nurse Care Coordinator   [phone]   Medical Questions Not Related to Your Discharge  (24 hours a day / 7 days a week)  Contact the Nurse Health Line   [phone]    Medications or Discharge Instructions Refer to your discharge packet   or contact your [facility] Primary Care Provider   [phone]   Follow-up Appointment(s) Schedule your appointment via Memoir   or contact Scheduling [phone]   Billing Review your statement via Memoir  or contact Billing [phone]   Medical Records Review your records via Memoir   or contact Medical Records [phone]     You may receive a telephone call within two days of discharge. This call is to make certain you understand your discharge instructions and have the opportunity to have any questions answered. You can also easily access your medical information, test results and upcoming appointments via the Memoir free online health management tool. You can learn more and sign up at [facility]/Memoir. For assistance setting up your Memoir account, please call [phone].    Once again, we want to ensure your discharge home is safe and that you have a clear understanding of any next steps in your care. If you have any questions or concerns, please do not hesitate to contact us, we are here for you. Thank you for choosing [facility] for your healthcare needs.    Sincerely,    Your [facility] Healthcare Team

## 2017-08-10 NOTE — ASSESSMENT & PLAN NOTE
Etiology unclear  Lipase is normal  CT scan of abdomen is normal  Has liver enzyme elevation with my bilirubin level  We'll get ultrasound abdomen  Pain management

## 2017-08-10 NOTE — H&P
Hospital Medicine History and Physical      Date of Service  8/10/2017    Chief Complaint  Chief Complaint   Patient presents with   • Abdominal Pain     Per EMS, pt has been having upper quadrant pain x1 day. Pt states last BM was over 2 days ago and states BM was normal. Pt denies any N/V.        History of Presenting Illness  Yoan is a 77 y.o. female who presents with abdominal pain started around 10 PM last night. She describes the pain as dull aching pain, 7 out of 10, occasionally radiated to her back, constant in nature. Patient denied any nausea vomiting or diarrhea. In the ER she has CT scan of the abdomen done and it was negative. Bowel found to have elevated liver enzyme and mildly elevated bilirubin level. Ultrasound abdomen is currently pending. She will be admitted to the floor for further management.    Primary Care Physician  JOSE ELIAS Becerra      Code Status  Full code per patient     Review of Systems  Review of Systems   Constitutional: Negative for fever, chills and weight loss.   HENT: Negative for congestion and nosebleeds.    Eyes: Negative for blurred vision, pain, discharge and redness.   Respiratory: Negative for cough, sputum production, shortness of breath and stridor.    Cardiovascular: Negative for chest pain, palpitations and orthopnea.   Gastrointestinal: Positive for abdominal pain. Negative for heartburn, nausea, vomiting and diarrhea.   Genitourinary: Negative for dysuria, urgency and frequency.   Musculoskeletal: Negative for myalgias, back pain and neck pain.   Skin: Negative for itching and rash.   Neurological: Negative for dizziness, focal weakness, seizures and headaches.   Psychiatric/Behavioral: Negative for depression. The patient is not nervous/anxious and does not have insomnia.      Please see HPI, all other systems were reviewed and are negative (AMA/CMS criteria)     Past Medical History  Past Medical History   Diagnosis Date   • Hypertension    • Stroke  (CMS-HCC) 2013   • CVA (cerebral vascular accident) (CMS-HCC)        Surgical History  History reviewed. No pertinent past surgical history.    Medications  No current facility-administered medications on file prior to encounter.     Current Outpatient Prescriptions on File Prior to Encounter   Medication Sig Dispense Refill   • atenolol (TENORMIN) 50 MG TABS Take 50 mg by mouth 2 times a day.     • clopidogrel (PLAVIX) 75 MG TABS Take 75 mg by mouth every day.     • trazodone (DESYREL) 50 MG TABS Take 50 mg by mouth every evening.     • acetaminophen (TYLENOL) 500 MG TABS Take 500 mg by mouth every 4 hours. Indications: Pain     • levetiracetam (KEPPRA) 500 MG TABS Take 500 mg by mouth 2 times a day.     • pravastatin (PRAVACHOL) 20 MG TABS Take 40 mg by mouth every day.     • alendronate (FOSAMAX) 10 MG TABS Take 35 mg by mouth every 7 days.     • vitamin D (CHOLECALCIFEROL) 1000 UNIT TABS Take 1,000 Units by mouth every day.     • nitrofurantoin monohydr macro (MACROBID) 100 MG CAPS Take 1 Cap by mouth 2 times a day. 20 Cap 0     Family History  No family history on file.  No pertinent family history    Social History  Social History   Substance Use Topics   • Smoking status: Former Smoker   • Smokeless tobacco: None   • Alcohol Use: No       Allergies  No Known Allergies     Physical Exam  Laboratory   Hemodynamics  Temp (24hrs), Av.6 °C (99.7 °F), Min:37.6 °C (99.7 °F), Max:37.6 °C (99.7 °F)   Temperature: 37.6 °C (99.7 °F)  Pulse  Av  Min: 90  Max: 90 Heart Rate (Monitored): 85  Blood Pressure : 133/78 mmHg, NIBP: 120/74 mmHg      Respiratory      Respiration: 20, Pulse Oximetry: 95 %             Physical Exam   Constitutional: She is oriented to person, place, and time. No distress.   HENT:   Head: Normocephalic and atraumatic.   Mouth/Throat: Oropharynx is clear and moist.   Eyes: Conjunctivae and EOM are normal. Pupils are equal, round, and reactive to light.   Neck: Normal range of motion. Neck  supple. No tracheal deviation present. No thyromegaly present.   Cardiovascular: Normal rate and regular rhythm.    No murmur heard.  Pulmonary/Chest: Effort normal and breath sounds normal. No respiratory distress. She has no wheezes.   Abdominal: Soft. Bowel sounds are normal. She exhibits no distension. There is tenderness. There is no rebound and no guarding.   Musculoskeletal: She exhibits no edema or tenderness.   Neurological: She is alert and oriented to person, place, and time. No cranial nerve deficit.   Skin: Skin is warm and dry. She is not diaphoretic. No erythema.   Psychiatric: She has a normal mood and affect. Her behavior is normal. Thought content normal.           Recent Labs      08/10/17   0210   SODIUM  134*   POTASSIUM  4.0   CHLORIDE  105   CO2  16*   GLUCOSE  87   BUN  10   CREATININE  0.62   CALCIUM  8.6     Recent Labs      08/10/17   0210   ALTSGPT  127*   ASTSGOT  67*   ALKPHOSPHAT  93   TBILIRUBIN  1.6*   LIPASE  11   GLUCOSE  87                 Lab Results   Component Value Date    TROPONINI <0.01 08/10/2017       Imaging  CT-ABDOMEN-PELVIS WITH   Final Result      1.  No acute intra-abdominal findings      2.  Moderate amount of colonic stool.           Assessment/Plan         Abdominal pain (present on admission)  Assessment & Plan  Etiology unclear  Lipase is normal  CT scan of abdomen is normal  Has liver enzyme elevation with my bilirubin level  We'll get ultrasound abdomen  Pain management    Hyponatremia (present on admission)  Assessment & Plan  On IV fluid    Elevated liver enzymes (present on admission)  Assessment & Plan  Plan as above  Will check hepatitis panel    History of stroke  Assessment & Plan  Continue plavix, statin        Prophylaxis:  sc heparin         This dictation was created using voice recognition software. The accuracy of the dictation is limited to the abilities of the software. I expect there may be some errors of grammar and possibly content.

## 2017-08-10 NOTE — PROGRESS NOTES
Pt admitted from ER, came to floor by timur, pt transferred to bed, cleaned of incontinence and made comfortable.   Pt and son updated on poc, including staying NPO except ice chips.  Admission history done, pt assessed.  No acute symptoms nor signs of distress. VSS.  Pt denies any abdominal pain at this time.  Bowel sounds normoactive, +Flatus.   -N/V, incontinent of voiding, last bm 8/8.  Pt denies any other needs at this time.  IV fluids to be hung, bed alarm being placed.  Call light within pt reach, will continue to monitor.

## 2017-09-19 PROBLEM — D49.2 NEOPLASM OF UNSPECIFIED BEHAVIOR OF BONE, SOFT TISSUE, AND SKIN: Status: RESOLVED | Noted: 2017-09-05 | Resolved: 2017-09-19

## 2017-11-11 NOTE — ED AVS SNAPSHOT
Essential Testing Access Code: Activation code not generated  Current Essential Testing Status: Patient Declined    Your email address is not on file at [facility].  Email Addresses are required for you to sign up for Essential Testing, please contact [phone] to verify your personal information and to provide your email address prior to attempting to register for Essential Testing.    Malena Milton  [address]  [place] [address]    [employer]  A secure, online tool to manage your health information     [facility]’s Essential Testing® is a secure, online tool that connects you to your personalized health information from the privacy of your home -- day or night - making it very easy for you to manage your healthcare. Once the activation process is completed, you can even access your medical information using the Essential Testing oracio, which is available for free in the Apple Oracio store or Google Play store.     To learn more about Essential Testing, visit [email or web address]    There are two levels of access available (as shown below):   My Chart Features  [facility] Primary Care Doctor [facility]  Specialists [facility]  Urgent  Care Non-[facility] Primary Care Doctor   Email your healthcare team securely and privately 24/7 X X X    Manage appointments: schedule your next appointment; view details of past/upcoming appointments X      Request prescription refills. X      View recent personal medical records, including lab and immunizations X X X X   View health record, including health history, allergies, medications X X X X   Read reports about your outpatient visits, procedures, consult and ER notes X X X X   See your discharge summary, which is a recap of your hospital and/or ER visit that includes your diagnosis, lab results, and care plan X X  X     How to register for [employer]:  Once your e-mail address has been verified, follow the following steps to sign up for [employer].     1. Go to  [email or web address]  2. Click on the Sign Up Now box, which takes you to the New Member  Sign Up page. You will need to provide the following information:  a. Enter your [employer] Access Code exactly as it appears at the top of this page. (You will not need to use this code after you’ve completed the sign-up process. If you do not sign up before the expiration date, you must request a new code.)   b. Enter your date of birth.   c. Enter your home email address.   d. Click Submit, and follow the next screen’s instructions.  3. Create a [employer] ID. This will be your [employer] login ID and cannot be changed, so think of one that is secure and easy to remember.  4. Create a [employer] password. You can change your password at any time.  5. Enter your Password Reset Question and Answer. This can be used at a later time if you forget your password.   6. Enter your e-mail address. This allows you to receive e-mail notifications when new information is available in [employer].  7. Click Sign Up. You can now view your health information.    For assistance activating your [employer] account, call [phone]          Never smoker

## 2018-01-14 ENCOUNTER — HOSPITAL ENCOUNTER (INPATIENT)
Facility: MEDICAL CENTER | Age: 78
LOS: 4 days | DRG: 189 | End: 2018-01-18
Attending: EMERGENCY MEDICINE | Admitting: INTERNAL MEDICINE
Payer: MEDICARE

## 2018-01-14 ENCOUNTER — APPOINTMENT (OUTPATIENT)
Dept: RADIOLOGY | Facility: MEDICAL CENTER | Age: 78
DRG: 189 | End: 2018-01-14
Attending: EMERGENCY MEDICINE
Payer: MEDICARE

## 2018-01-14 ENCOUNTER — APPOINTMENT (OUTPATIENT)
Dept: RADIOLOGY | Facility: MEDICAL CENTER | Age: 78
DRG: 189 | End: 2018-01-14
Attending: INTERNAL MEDICINE
Payer: MEDICARE

## 2018-01-14 ENCOUNTER — RESOLUTE PROFESSIONAL BILLING HOSPITAL PROF FEE (OUTPATIENT)
Dept: HOSPITALIST | Facility: MEDICAL CENTER | Age: 78
End: 2018-01-14
Payer: MEDICARE

## 2018-01-14 DIAGNOSIS — J06.9 UPPER RESPIRATORY TRACT INFECTION, UNSPECIFIED TYPE: ICD-10-CM

## 2018-01-14 DIAGNOSIS — I48.91 ATRIAL FIBRILLATION, RAPID (HCC): ICD-10-CM

## 2018-01-14 DIAGNOSIS — R06.00 DYSPNEA, UNSPECIFIED TYPE: ICD-10-CM

## 2018-01-14 PROBLEM — J21.9 BRONCHIOLITIS: Status: ACTIVE | Noted: 2018-01-14

## 2018-01-14 PROBLEM — I47.10 SVT (SUPRAVENTRICULAR TACHYCARDIA) (HCC): Status: ACTIVE | Noted: 2018-01-14

## 2018-01-14 PROBLEM — I10 HTN (HYPERTENSION): Status: ACTIVE | Noted: 2018-01-14

## 2018-01-14 PROBLEM — R91.1 PULMONARY NODULE: Status: ACTIVE | Noted: 2018-01-14

## 2018-01-14 PROBLEM — J02.9 PHARYNGITIS: Status: ACTIVE | Noted: 2018-01-14

## 2018-01-14 PROBLEM — J96.01 ACUTE HYPOXEMIC RESPIRATORY FAILURE (HCC): Status: ACTIVE | Noted: 2018-01-14

## 2018-01-14 PROBLEM — J45.909 RAD (REACTIVE AIRWAY DISEASE): Status: ACTIVE | Noted: 2018-01-14

## 2018-01-14 PROBLEM — D72.821 MONOCYTOSIS: Status: ACTIVE | Noted: 2018-01-14

## 2018-01-14 PROBLEM — D69.6 THROMBOCYTOPENIA (HCC): Status: ACTIVE | Noted: 2018-01-14

## 2018-01-14 PROBLEM — E78.5 HLD (HYPERLIPIDEMIA): Status: ACTIVE | Noted: 2018-01-14

## 2018-01-14 LAB
ALBUMIN SERPL BCP-MCNC: 4 G/DL (ref 3.2–4.9)
ALBUMIN/GLOB SERPL: 0.9 G/DL
ALP SERPL-CCNC: 58 U/L (ref 30–99)
ALT SERPL-CCNC: 15 U/L (ref 2–50)
ANION GAP SERPL CALC-SCNC: 13 MMOL/L (ref 0–11.9)
ANISOCYTOSIS BLD QL SMEAR: ABNORMAL
APTT PPP: 32.6 SEC (ref 24.7–36)
AST SERPL-CCNC: 25 U/L (ref 12–45)
BASOPHILS # BLD AUTO: 0 % (ref 0–1.8)
BASOPHILS # BLD: 0 K/UL (ref 0–0.12)
BILIRUB SERPL-MCNC: 0.7 MG/DL (ref 0.1–1.5)
BNP SERPL-MCNC: 30 PG/ML (ref 0–100)
BUN SERPL-MCNC: 18 MG/DL (ref 8–22)
CALCIUM SERPL-MCNC: 8.9 MG/DL (ref 8.5–10.5)
CHLORIDE SERPL-SCNC: 103 MMOL/L (ref 96–112)
CO2 SERPL-SCNC: 21 MMOL/L (ref 20–33)
CREAT SERPL-MCNC: 0.72 MG/DL (ref 0.5–1.4)
CRP SERPL HS-MCNC: 5.79 MG/DL (ref 0–0.75)
EKG IMPRESSION: NORMAL
EOSINOPHIL # BLD AUTO: 0 K/UL (ref 0–0.51)
EOSINOPHIL NFR BLD: 0 % (ref 0–6.9)
ERYTHROCYTE [DISTWIDTH] IN BLOOD BY AUTOMATED COUNT: 45.5 FL (ref 35.9–50)
ERYTHROCYTE [SEDIMENTATION RATE] IN BLOOD BY WESTERGREN METHOD: 89 MM/HOUR (ref 0–30)
FLUAV RNA SPEC QL NAA+PROBE: NEGATIVE
FLUAV+FLUBV AG SPEC QL IA: NORMAL
FLUBV RNA SPEC QL NAA+PROBE: NEGATIVE
GLOBULIN SER CALC-MCNC: 4.4 G/DL (ref 1.9–3.5)
GLUCOSE SERPL-MCNC: 112 MG/DL (ref 65–99)
GRAM STN SPEC: NORMAL
HCT VFR BLD AUTO: 44.8 % (ref 37–47)
HETEROPH AB SER QL: NEGATIVE
HGB BLD-MCNC: 15.1 G/DL (ref 12–16)
INR PPP: 1 (ref 0.87–1.13)
LACTATE BLD-SCNC: 1.3 MMOL/L (ref 0.5–2)
LACTATE BLD-SCNC: 2 MMOL/L (ref 0.5–2)
LYMPHOCYTES # BLD AUTO: 1.07 K/UL (ref 1–4.8)
LYMPHOCYTES NFR BLD: 18.7 % (ref 22–41)
MACROCYTES BLD QL SMEAR: ABNORMAL
MAGNESIUM SERPL-MCNC: 2.1 MG/DL (ref 1.5–2.5)
MANUAL DIFF BLD: NORMAL
MCH RBC QN AUTO: 33.5 PG (ref 27–33)
MCHC RBC AUTO-ENTMCNC: 33.7 G/DL (ref 33.6–35)
MCV RBC AUTO: 99.3 FL (ref 81.4–97.8)
MONOCYTES # BLD AUTO: 1.68 K/UL (ref 0–0.85)
MONOCYTES NFR BLD AUTO: 29.5 % (ref 0–13.4)
MORPHOLOGY BLD-IMP: NORMAL
NEUTROPHILS # BLD AUTO: 2.95 K/UL (ref 2–7.15)
NEUTROPHILS NFR BLD: 47.3 % (ref 44–72)
NEUTS BAND NFR BLD MANUAL: 4.5 % (ref 0–10)
NRBC # BLD AUTO: 0 K/UL
NRBC BLD-RTO: 0 /100 WBC
PHOSPHATE SERPL-MCNC: 2.3 MG/DL (ref 2.5–4.5)
PLATELET # BLD AUTO: 150 K/UL (ref 164–446)
PMV BLD AUTO: 10.6 FL (ref 9–12.9)
POTASSIUM SERPL-SCNC: 3.7 MMOL/L (ref 3.6–5.5)
PROCALCITONIN SERPL-MCNC: 0.1 NG/ML
PROT SERPL-MCNC: 8.4 G/DL (ref 6–8.2)
PROTHROMBIN TIME: 12.9 SEC (ref 12–14.6)
RBC # BLD AUTO: 4.51 M/UL (ref 4.2–5.4)
RBC BLD AUTO: PRESENT
RSV AG SPEC QL IA: NORMAL
SIGNIFICANT IND 70042: NORMAL
SITE SITE: NORMAL
SODIUM SERPL-SCNC: 137 MMOL/L (ref 135–145)
SOURCE SOURCE: NORMAL
TROPONIN I SERPL-MCNC: <0.01 NG/ML (ref 0–0.04)
TSH SERPL DL<=0.005 MIU/L-ACNC: 1.06 UIU/ML (ref 0.38–5.33)
WBC # BLD AUTO: 5.7 K/UL (ref 4.8–10.8)

## 2018-01-14 PROCEDURE — 85610 PROTHROMBIN TIME: CPT

## 2018-01-14 PROCEDURE — 70450 CT HEAD/BRAIN W/O DYE: CPT

## 2018-01-14 PROCEDURE — 86645 CMV ANTIBODY IGM: CPT

## 2018-01-14 PROCEDURE — 86665 EPSTEIN-BARR CAPSID VCA: CPT | Mod: 91

## 2018-01-14 PROCEDURE — 83880 ASSAY OF NATRIURETIC PEPTIDE: CPT

## 2018-01-14 PROCEDURE — 84443 ASSAY THYROID STIM HORMONE: CPT

## 2018-01-14 PROCEDURE — 87400 INFLUENZA A/B EACH AG IA: CPT

## 2018-01-14 PROCEDURE — 86664 EPSTEIN-BARR NUCLEAR ANTIGEN: CPT

## 2018-01-14 PROCEDURE — 700105 HCHG RX REV CODE 258: Performed by: EMERGENCY MEDICINE

## 2018-01-14 PROCEDURE — 85652 RBC SED RATE AUTOMATED: CPT

## 2018-01-14 PROCEDURE — 96374 THER/PROPH/DIAG INJ IV PUSH: CPT

## 2018-01-14 PROCEDURE — 700105 HCHG RX REV CODE 258: Performed by: INTERNAL MEDICINE

## 2018-01-14 PROCEDURE — 87205 SMEAR GRAM STAIN: CPT

## 2018-01-14 PROCEDURE — 85007 BL SMEAR W/DIFF WBC COUNT: CPT | Mod: 91

## 2018-01-14 PROCEDURE — 700101 HCHG RX REV CODE 250: Performed by: INTERNAL MEDICINE

## 2018-01-14 PROCEDURE — 36415 COLL VENOUS BLD VENIPUNCTURE: CPT

## 2018-01-14 PROCEDURE — 700117 HCHG RX CONTRAST REV CODE 255: Performed by: EMERGENCY MEDICINE

## 2018-01-14 PROCEDURE — 83735 ASSAY OF MAGNESIUM: CPT

## 2018-01-14 PROCEDURE — 85027 COMPLETE CBC AUTOMATED: CPT | Mod: 91

## 2018-01-14 PROCEDURE — 99223 1ST HOSP IP/OBS HIGH 75: CPT | Mod: AI | Performed by: INTERNAL MEDICINE

## 2018-01-14 PROCEDURE — 87502 INFLUENZA DNA AMP PROBE: CPT

## 2018-01-14 PROCEDURE — 87420 RESP SYNCYTIAL VIRUS AG IA: CPT

## 2018-01-14 PROCEDURE — 87040 BLOOD CULTURE FOR BACTERIA: CPT | Mod: 91

## 2018-01-14 PROCEDURE — 86308 HETEROPHILE ANTIBODY SCREEN: CPT

## 2018-01-14 PROCEDURE — 86713 LEGIONELLA ANTIBODY: CPT

## 2018-01-14 PROCEDURE — 93005 ELECTROCARDIOGRAM TRACING: CPT | Performed by: INTERNAL MEDICINE

## 2018-01-14 PROCEDURE — 700111 HCHG RX REV CODE 636 W/ 250 OVERRIDE (IP): Performed by: INTERNAL MEDICINE

## 2018-01-14 PROCEDURE — 80053 COMPREHEN METABOLIC PANEL: CPT

## 2018-01-14 PROCEDURE — 86644 CMV ANTIBODY: CPT

## 2018-01-14 PROCEDURE — 71275 CT ANGIOGRAPHY CHEST: CPT

## 2018-01-14 PROCEDURE — 86663 EPSTEIN-BARR ANTIBODY: CPT

## 2018-01-14 PROCEDURE — 700111 HCHG RX REV CODE 636 W/ 250 OVERRIDE (IP): Performed by: EMERGENCY MEDICINE

## 2018-01-14 PROCEDURE — 700102 HCHG RX REV CODE 250 W/ 637 OVERRIDE(OP): Performed by: EMERGENCY MEDICINE

## 2018-01-14 PROCEDURE — 83605 ASSAY OF LACTIC ACID: CPT

## 2018-01-14 PROCEDURE — 99285 EMERGENCY DEPT VISIT HI MDM: CPT

## 2018-01-14 PROCEDURE — A9270 NON-COVERED ITEM OR SERVICE: HCPCS | Performed by: EMERGENCY MEDICINE

## 2018-01-14 PROCEDURE — 85730 THROMBOPLASTIN TIME PARTIAL: CPT

## 2018-01-14 PROCEDURE — 84484 ASSAY OF TROPONIN QUANT: CPT | Mod: 91

## 2018-01-14 PROCEDURE — A9270 NON-COVERED ITEM OR SERVICE: HCPCS | Performed by: INTERNAL MEDICINE

## 2018-01-14 PROCEDURE — 87070 CULTURE OTHR SPECIMN AEROBIC: CPT

## 2018-01-14 PROCEDURE — 86140 C-REACTIVE PROTEIN: CPT

## 2018-01-14 PROCEDURE — 84145 PROCALCITONIN (PCT): CPT

## 2018-01-14 PROCEDURE — 700102 HCHG RX REV CODE 250 W/ 637 OVERRIDE(OP): Performed by: INTERNAL MEDICINE

## 2018-01-14 PROCEDURE — 87633 RESP VIRUS 12-25 TARGETS: CPT

## 2018-01-14 PROCEDURE — 71045 X-RAY EXAM CHEST 1 VIEW: CPT

## 2018-01-14 PROCEDURE — 94640 AIRWAY INHALATION TREATMENT: CPT

## 2018-01-14 PROCEDURE — 770020 HCHG ROOM/CARE - TELE (206)

## 2018-01-14 PROCEDURE — 84100 ASSAY OF PHOSPHORUS: CPT

## 2018-01-14 RX ORDER — AZITHROMYCIN 500 MG/1
500 INJECTION, POWDER, LYOPHILIZED, FOR SOLUTION INTRAVENOUS ONCE
Status: DISCONTINUED | OUTPATIENT
Start: 2018-01-14 | End: 2018-01-14

## 2018-01-14 RX ORDER — PREDNISONE 20 MG/1
40 TABLET ORAL DAILY
Status: DISCONTINUED | OUTPATIENT
Start: 2018-01-15 | End: 2018-01-15

## 2018-01-14 RX ORDER — IPRATROPIUM BROMIDE AND ALBUTEROL SULFATE 2.5; .5 MG/3ML; MG/3ML
3 SOLUTION RESPIRATORY (INHALATION)
Status: DISCONTINUED | OUTPATIENT
Start: 2018-01-14 | End: 2018-01-15

## 2018-01-14 RX ORDER — METHYLPREDNISOLONE SODIUM SUCCINATE 125 MG/2ML
62.5 INJECTION, POWDER, LYOPHILIZED, FOR SOLUTION INTRAMUSCULAR; INTRAVENOUS ONCE
Status: COMPLETED | OUTPATIENT
Start: 2018-01-14 | End: 2018-01-14

## 2018-01-14 RX ORDER — ATENOLOL 50 MG/1
50 TABLET ORAL DAILY
Status: DISCONTINUED | OUTPATIENT
Start: 2018-01-15 | End: 2018-01-18 | Stop reason: HOSPADM

## 2018-01-14 RX ORDER — ONDANSETRON 2 MG/ML
4 INJECTION INTRAMUSCULAR; INTRAVENOUS EVERY 4 HOURS PRN
Status: DISCONTINUED | OUTPATIENT
Start: 2018-01-14 | End: 2018-01-18 | Stop reason: HOSPADM

## 2018-01-14 RX ORDER — LABETALOL HYDROCHLORIDE 5 MG/ML
10 INJECTION, SOLUTION INTRAVENOUS EVERY 4 HOURS PRN
Status: DISCONTINUED | OUTPATIENT
Start: 2018-01-14 | End: 2018-01-15

## 2018-01-14 RX ORDER — AZITHROMYCIN 250 MG/1
500 TABLET, FILM COATED ORAL ONCE
Status: COMPLETED | OUTPATIENT
Start: 2018-01-14 | End: 2018-01-14

## 2018-01-14 RX ORDER — AZITHROMYCIN 250 MG/1
250 TABLET, FILM COATED ORAL DAILY
Status: DISCONTINUED | OUTPATIENT
Start: 2018-01-15 | End: 2018-01-15

## 2018-01-14 RX ORDER — ONDANSETRON 4 MG/1
4 TABLET, ORALLY DISINTEGRATING ORAL EVERY 4 HOURS PRN
Status: DISCONTINUED | OUTPATIENT
Start: 2018-01-14 | End: 2018-01-18 | Stop reason: HOSPADM

## 2018-01-14 RX ORDER — BENZONATATE 100 MG/1
100 CAPSULE ORAL 3 TIMES DAILY PRN
COMMUNITY
End: 2018-08-26

## 2018-01-14 RX ORDER — CLOPIDOGREL BISULFATE 75 MG/1
75 TABLET ORAL DAILY
Status: DISCONTINUED | OUTPATIENT
Start: 2018-01-15 | End: 2018-01-18 | Stop reason: HOSPADM

## 2018-01-14 RX ORDER — AMOXICILLIN 250 MG
2 CAPSULE ORAL 2 TIMES DAILY
Status: DISCONTINUED | OUTPATIENT
Start: 2018-01-14 | End: 2018-01-18 | Stop reason: HOSPADM

## 2018-01-14 RX ORDER — SODIUM CHLORIDE 9 MG/ML
500 INJECTION, SOLUTION INTRAVENOUS
Status: DISCONTINUED | OUTPATIENT
Start: 2018-01-14 | End: 2018-01-18 | Stop reason: HOSPADM

## 2018-01-14 RX ORDER — PRAVASTATIN SODIUM 20 MG
40 TABLET ORAL NIGHTLY
Status: DISCONTINUED | OUTPATIENT
Start: 2018-01-15 | End: 2018-01-18 | Stop reason: HOSPADM

## 2018-01-14 RX ORDER — SODIUM CHLORIDE 9 MG/ML
400 INJECTION, SOLUTION INTRAVENOUS ONCE
Status: COMPLETED | OUTPATIENT
Start: 2018-01-14 | End: 2018-01-14

## 2018-01-14 RX ORDER — ACETAMINOPHEN 325 MG/1
650 TABLET ORAL EVERY 6 HOURS PRN
Status: DISCONTINUED | OUTPATIENT
Start: 2018-01-14 | End: 2018-01-15

## 2018-01-14 RX ORDER — LEVETIRACETAM 500 MG/1
500 TABLET ORAL 2 TIMES DAILY
Status: DISCONTINUED | OUTPATIENT
Start: 2018-01-14 | End: 2018-01-18 | Stop reason: HOSPADM

## 2018-01-14 RX ORDER — CEFTRIAXONE 1 G/1
1 INJECTION, POWDER, FOR SOLUTION INTRAMUSCULAR; INTRAVENOUS ONCE
Status: COMPLETED | OUTPATIENT
Start: 2018-01-14 | End: 2018-01-14

## 2018-01-14 RX ORDER — SODIUM CHLORIDE 9 MG/ML
1000 INJECTION, SOLUTION INTRAVENOUS ONCE
Status: COMPLETED | OUTPATIENT
Start: 2018-01-14 | End: 2018-01-14

## 2018-01-14 RX ORDER — POLYETHYLENE GLYCOL 3350 17 G/17G
1 POWDER, FOR SOLUTION ORAL
Status: DISCONTINUED | OUTPATIENT
Start: 2018-01-14 | End: 2018-01-18 | Stop reason: HOSPADM

## 2018-01-14 RX ORDER — HYDROCODONE BITARTRATE AND ACETAMINOPHEN 5; 325 MG/1; MG/1
1 TABLET ORAL EVERY 8 HOURS PRN
Status: DISCONTINUED | OUTPATIENT
Start: 2018-01-14 | End: 2018-01-18 | Stop reason: HOSPADM

## 2018-01-14 RX ORDER — BISACODYL 10 MG
10 SUPPOSITORY, RECTAL RECTAL
Status: DISCONTINUED | OUTPATIENT
Start: 2018-01-14 | End: 2018-01-18 | Stop reason: HOSPADM

## 2018-01-14 RX ADMIN — METHYLPREDNISOLONE SODIUM SUCCINATE 62.5 MG: 125 INJECTION, POWDER, FOR SOLUTION INTRAMUSCULAR; INTRAVENOUS at 13:51

## 2018-01-14 RX ADMIN — AZITHROMYCIN 500 MG: 250 TABLET, FILM COATED ORAL at 10:42

## 2018-01-14 RX ADMIN — SODIUM CHLORIDE 400 ML: 9 INJECTION, SOLUTION INTRAVENOUS at 14:14

## 2018-01-14 RX ADMIN — LEVETIRACETAM 500 MG: 500 TABLET, FILM COATED ORAL at 21:11

## 2018-01-14 RX ADMIN — IOHEXOL 75 ML: 350 INJECTION, SOLUTION INTRAVENOUS at 12:24

## 2018-01-14 RX ADMIN — IPRATROPIUM BROMIDE AND ALBUTEROL SULFATE 3 ML: .5; 3 SOLUTION RESPIRATORY (INHALATION) at 15:12

## 2018-01-14 RX ADMIN — CEFTRIAXONE SODIUM 1 G: 1 INJECTION, POWDER, FOR SOLUTION INTRAMUSCULAR; INTRAVENOUS at 10:42

## 2018-01-14 RX ADMIN — IPRATROPIUM BROMIDE AND ALBUTEROL SULFATE 3 ML: .5; 3 SOLUTION RESPIRATORY (INHALATION) at 19:32

## 2018-01-14 RX ADMIN — IPRATROPIUM BROMIDE AND ALBUTEROL SULFATE 3 ML: .5; 3 SOLUTION RESPIRATORY (INHALATION) at 23:13

## 2018-01-14 RX ADMIN — ENOXAPARIN SODIUM 40 MG: 100 INJECTION SUBCUTANEOUS at 13:50

## 2018-01-14 RX ADMIN — POTASSIUM PHOSPHATE, MONOBASIC AND POTASSIUM PHOSPHATE, DIBASIC 10 MMOL: 224; 236 INJECTION, SOLUTION INTRAVENOUS at 21:56

## 2018-01-14 RX ADMIN — SODIUM CHLORIDE 1000 ML: 9 INJECTION, SOLUTION INTRAVENOUS at 10:42

## 2018-01-14 ASSESSMENT — COPD QUESTIONNAIRES
COPD SCREENING SCORE: 5
DO YOU EVER COUGH UP ANY MUCUS OR PHLEGM?: NO/ONLY WITH OCCASIONAL COLDS OR INFECTIONS
DURING THE PAST 4 WEEKS HOW MUCH DID YOU FEEL SHORT OF BREATH: SOME OF THE TIME
HAVE YOU SMOKED AT LEAST 100 CIGARETTES IN YOUR ENTIRE LIFE: YES

## 2018-01-14 ASSESSMENT — LIFESTYLE VARIABLES
EVER_SMOKED: YES
EVER_SMOKED: YES
ALCOHOL_USE: NO
DO YOU DRINK ALCOHOL: NO

## 2018-01-14 ASSESSMENT — PAIN SCALES - GENERAL
PAINLEVEL_OUTOF10: 0

## 2018-01-14 ASSESSMENT — ENCOUNTER SYMPTOMS
ABDOMINAL PAIN: 0
PND: 1
LOSS OF CONSCIOUSNESS: 0
BLURRED VISION: 0
PALPITATIONS: 0
VOMITING: 0
NECK PAIN: 0
SEIZURES: 0
DIARRHEA: 0
FALLS: 0
BLOOD IN STOOL: 0
TREMORS: 0
TINGLING: 0
CONSTIPATION: 0
CHILLS: 1
DEPRESSION: 0
BACK PAIN: 0
SENSORY CHANGE: 0
ORTHOPNEA: 1
DIAPHORESIS: 0
STRIDOR: 0
FOCAL WEAKNESS: 1
MYALGIAS: 1
CLAUDICATION: 0
COUGH: 1
SPUTUM PRODUCTION: 0
SORE THROAT: 1
DIZZINESS: 0
FEVER: 1
WHEEZING: 1
NAUSEA: 0
SINUS PAIN: 0
SPEECH CHANGE: 0
WEAKNESS: 1
HEADACHES: 1
DOUBLE VISION: 0
FLANK PAIN: 0
SHORTNESS OF BREATH: 1
HEARTBURN: 0
HEMOPTYSIS: 0

## 2018-01-14 ASSESSMENT — PATIENT HEALTH QUESTIONNAIRE - PHQ9
SUM OF ALL RESPONSES TO PHQ QUESTIONS 1-9: 1
5. POOR APPETITE OR OVEREATING: NOT AT ALL
8. MOVING OR SPEAKING SO SLOWLY THAT OTHER PEOPLE COULD HAVE NOTICED. OR THE OPPOSITE, BEING SO FIGETY OR RESTLESS THAT YOU HAVE BEEN MOVING AROUND A LOT MORE THAN USUAL: NOT AT ALL
SUM OF ALL RESPONSES TO PHQ9 QUESTIONS 1 AND 2: 1
1. LITTLE INTEREST OR PLEASURE IN DOING THINGS: NOT AT ALL
4. FEELING TIRED OR HAVING LITTLE ENERGY: NOT AT ALL
9. THOUGHTS THAT YOU WOULD BE BETTER OFF DEAD, OR OF HURTING YOURSELF: NOT AT ALL
6. FEELING BAD ABOUT YOURSELF - OR THAT YOU ARE A FAILURE OR HAVE LET YOURSELF OR YOUR FAMILY DOWN: NOT AL ALL
7. TROUBLE CONCENTRATING ON THINGS, SUCH AS READING THE NEWSPAPER OR WATCHING TELEVISION: NOT AT ALL
2. FEELING DOWN, DEPRESSED, IRRITABLE, OR HOPELESS: SEVERAL DAYS
3. TROUBLE FALLING OR STAYING ASLEEP OR SLEEPING TOO MUCH: NOT AT ALL

## 2018-01-14 ASSESSMENT — COGNITIVE AND FUNCTIONAL STATUS - GENERAL
CLIMB 3 TO 5 STEPS WITH RAILING: TOTAL
DAILY ACTIVITIY SCORE: 18
PERSONAL GROOMING: A LITTLE
STANDING UP FROM CHAIR USING ARMS: TOTAL
DRESSING REGULAR LOWER BODY CLOTHING: A LITTLE
MOVING FROM LYING ON BACK TO SITTING ON SIDE OF FLAT BED: A LOT
TOILETING: TOTAL
TURNING FROM BACK TO SIDE WHILE IN FLAT BAD: A LITTLE
SUGGESTED CMS G CODE MODIFIER MOBILITY: CL
WALKING IN HOSPITAL ROOM: TOTAL
MOVING TO AND FROM BED TO CHAIR: A LOT
DRESSING REGULAR UPPER BODY CLOTHING: A LITTLE
MOBILITY SCORE: 10
SUGGESTED CMS G CODE MODIFIER DAILY ACTIVITY: CK

## 2018-01-14 NOTE — ED NOTES
.  Chief Complaint   Patient presents with   • Cough     To triage per w/c and family,  Reports non productive cough, h/a and sore throat x 5 days, RX tessalon per PCP.  Came in today 2nd to shortness of breath, unable to sleep and cough worsening. Care giver states to family, pt had fever 2 nights ago.  educated in triage. mask on pt.   • Head Ache   • Sore Throat

## 2018-01-14 NOTE — PROGRESS NOTES
Bedside report received from Divya in ED. Patient A&O x4. Pt has left sided weakness from previous stroke.  2L NC.  Monitor room notified. Pt ST on the monitor. No complaints of pain at this time. POC discussed with patient. Patient verbalized understanding. Call light and belongings within reach. Bed locked and in lowest position, alarm and fall precautions in place.

## 2018-01-15 ENCOUNTER — PATIENT OUTREACH (OUTPATIENT)
Dept: HEALTH INFORMATION MANAGEMENT | Facility: OTHER | Age: 78
End: 2018-01-15

## 2018-01-15 PROBLEM — R53.81 PHYSICAL DEBILITY: Status: ACTIVE | Noted: 2018-01-15

## 2018-01-15 PROBLEM — E87.6 HYPOKALEMIA: Status: ACTIVE | Noted: 2018-01-15

## 2018-01-15 PROBLEM — N19 ACUTE PRERENAL AZOTEMIA: Status: ACTIVE | Noted: 2018-01-15

## 2018-01-15 LAB
ALBUMIN SERPL BCP-MCNC: 3.6 G/DL (ref 3.2–4.9)
ALBUMIN/GLOB SERPL: 1.1 G/DL
ALP SERPL-CCNC: 42 U/L (ref 30–99)
ALT SERPL-CCNC: 14 U/L (ref 2–50)
ANION GAP SERPL CALC-SCNC: 10 MMOL/L (ref 0–11.9)
ANION GAP SERPL CALC-SCNC: 8 MMOL/L (ref 0–11.9)
ANISOCYTOSIS BLD QL SMEAR: ABNORMAL
AST SERPL-CCNC: 22 U/L (ref 12–45)
BASOPHILS # BLD AUTO: 0 % (ref 0–1.8)
BASOPHILS # BLD: 0 K/UL (ref 0–0.12)
BILIRUB SERPL-MCNC: 0.4 MG/DL (ref 0.1–1.5)
BUN SERPL-MCNC: 12 MG/DL (ref 8–22)
BUN SERPL-MCNC: 14 MG/DL (ref 8–22)
CALCIUM SERPL-MCNC: 8 MG/DL (ref 8.5–10.5)
CALCIUM SERPL-MCNC: 8.4 MG/DL (ref 8.5–10.5)
CHLORIDE SERPL-SCNC: 109 MMOL/L (ref 96–112)
CHLORIDE SERPL-SCNC: 110 MMOL/L (ref 96–112)
CO2 SERPL-SCNC: 21 MMOL/L (ref 20–33)
CO2 SERPL-SCNC: 24 MMOL/L (ref 20–33)
CREAT SERPL-MCNC: 0.54 MG/DL (ref 0.5–1.4)
CREAT SERPL-MCNC: 0.6 MG/DL (ref 0.5–1.4)
DACRYOCYTES BLD QL SMEAR: NORMAL
EOSINOPHIL # BLD AUTO: 0 K/UL (ref 0–0.51)
EOSINOPHIL NFR BLD: 0 % (ref 0–6.9)
ERYTHROCYTE [DISTWIDTH] IN BLOOD BY AUTOMATED COUNT: 44.4 FL (ref 35.9–50)
ERYTHROCYTE [DISTWIDTH] IN BLOOD BY AUTOMATED COUNT: 45.1 FL (ref 35.9–50)
GLOBULIN SER CALC-MCNC: 3.2 G/DL (ref 1.9–3.5)
GLUCOSE SERPL-MCNC: 140 MG/DL (ref 65–99)
GLUCOSE SERPL-MCNC: 154 MG/DL (ref 65–99)
HCT VFR BLD AUTO: 39.5 % (ref 37–47)
HCT VFR BLD AUTO: 40.2 % (ref 37–47)
HGB BLD-MCNC: 13.4 G/DL (ref 12–16)
HGB BLD-MCNC: 14 G/DL (ref 12–16)
LYMPHOCYTES # BLD AUTO: 1.52 K/UL (ref 1–4.8)
LYMPHOCYTES NFR BLD: 50.5 % (ref 22–41)
MACROCYTES BLD QL SMEAR: ABNORMAL
MAGNESIUM SERPL-MCNC: 2.1 MG/DL (ref 1.5–2.5)
MAGNESIUM SERPL-MCNC: 2.1 MG/DL (ref 1.5–2.5)
MANUAL DIFF BLD: NORMAL
MCH RBC QN AUTO: 33.9 PG (ref 27–33)
MCH RBC QN AUTO: 33.9 PG (ref 27–33)
MCHC RBC AUTO-ENTMCNC: 33.9 G/DL (ref 33.6–35)
MCHC RBC AUTO-ENTMCNC: 34.8 G/DL (ref 33.6–35)
MCV RBC AUTO: 100 FL (ref 81.4–97.8)
MCV RBC AUTO: 97.3 FL (ref 81.4–97.8)
METAMYELOCYTES NFR BLD MANUAL: 0.9 %
MONOCYTES # BLD AUTO: 0.13 K/UL (ref 0–0.85)
MONOCYTES NFR BLD AUTO: 4.4 % (ref 0–13.4)
MORPHOLOGY BLD-IMP: NORMAL
NEUTROPHILS # BLD AUTO: 1.33 K/UL (ref 2–7.15)
NEUTROPHILS NFR BLD: 39.8 % (ref 44–72)
NEUTS BAND NFR BLD MANUAL: 4.4 % (ref 0–10)
NRBC # BLD AUTO: 0 K/UL
NRBC BLD-RTO: 0 /100 WBC
PHOSPHATE SERPL-MCNC: 2.1 MG/DL (ref 2.5–4.5)
PLATELET # BLD AUTO: 125 K/UL (ref 164–446)
PLATELET # BLD AUTO: 141 K/UL (ref 164–446)
PLATELET BLD QL SMEAR: NORMAL
PMV BLD AUTO: 10.4 FL (ref 9–12.9)
PMV BLD AUTO: 10.8 FL (ref 9–12.9)
POIKILOCYTOSIS BLD QL SMEAR: NORMAL
POTASSIUM SERPL-SCNC: 3.4 MMOL/L (ref 3.6–5.5)
POTASSIUM SERPL-SCNC: 3.5 MMOL/L (ref 3.6–5.5)
PROT SERPL-MCNC: 6.8 G/DL (ref 6–8.2)
RBC # BLD AUTO: 3.95 M/UL (ref 4.2–5.4)
RBC # BLD AUTO: 4.13 M/UL (ref 4.2–5.4)
RBC BLD AUTO: PRESENT
SMUDGE CELLS BLD QL SMEAR: NORMAL
SODIUM SERPL-SCNC: 141 MMOL/L (ref 135–145)
SODIUM SERPL-SCNC: 141 MMOL/L (ref 135–145)
TROPONIN I SERPL-MCNC: <0.01 NG/ML (ref 0–0.04)
WBC # BLD AUTO: 3 K/UL (ref 4.8–10.8)
WBC # BLD AUTO: 3.9 K/UL (ref 4.8–10.8)

## 2018-01-15 PROCEDURE — 85027 COMPLETE CBC AUTOMATED: CPT

## 2018-01-15 PROCEDURE — G8979 MOBILITY GOAL STATUS: HCPCS | Mod: CL

## 2018-01-15 PROCEDURE — 87081 CULTURE SCREEN ONLY: CPT

## 2018-01-15 PROCEDURE — 700101 HCHG RX REV CODE 250: Performed by: INTERNAL MEDICINE

## 2018-01-15 PROCEDURE — 94640 AIRWAY INHALATION TREATMENT: CPT

## 2018-01-15 PROCEDURE — G8978 MOBILITY CURRENT STATUS: HCPCS | Mod: CL

## 2018-01-15 PROCEDURE — 80048 BASIC METABOLIC PNL TOTAL CA: CPT

## 2018-01-15 PROCEDURE — 700111 HCHG RX REV CODE 636 W/ 250 OVERRIDE (IP): Performed by: HOSPITALIST

## 2018-01-15 PROCEDURE — 36415 COLL VENOUS BLD VENIPUNCTURE: CPT

## 2018-01-15 PROCEDURE — 700102 HCHG RX REV CODE 250 W/ 637 OVERRIDE(OP): Performed by: INTERNAL MEDICINE

## 2018-01-15 PROCEDURE — 87086 URINE CULTURE/COLONY COUNT: CPT

## 2018-01-15 PROCEDURE — A9270 NON-COVERED ITEM OR SERVICE: HCPCS | Performed by: INTERNAL MEDICINE

## 2018-01-15 PROCEDURE — 83735 ASSAY OF MAGNESIUM: CPT

## 2018-01-15 PROCEDURE — G8980 MOBILITY D/C STATUS: HCPCS | Mod: CL

## 2018-01-15 PROCEDURE — 99233 SBSQ HOSP IP/OBS HIGH 50: CPT | Performed by: HOSPITALIST

## 2018-01-15 PROCEDURE — 97161 PT EVAL LOW COMPLEX 20 MIN: CPT

## 2018-01-15 PROCEDURE — 770020 HCHG ROOM/CARE - TELE (206)

## 2018-01-15 PROCEDURE — 700105 HCHG RX REV CODE 258: Performed by: HOSPITALIST

## 2018-01-15 RX ORDER — HEPARIN SODIUM 5000 [USP'U]/ML
5000 INJECTION, SOLUTION INTRAVENOUS; SUBCUTANEOUS EVERY 12 HOURS
Status: DISCONTINUED | OUTPATIENT
Start: 2018-01-15 | End: 2018-01-18 | Stop reason: HOSPADM

## 2018-01-15 RX ORDER — ACETAMINOPHEN 325 MG/1
650 TABLET ORAL EVERY 6 HOURS PRN
Status: DISCONTINUED | OUTPATIENT
Start: 2018-01-15 | End: 2018-01-18 | Stop reason: HOSPADM

## 2018-01-15 RX ORDER — SODIUM CHLORIDE 9 MG/ML
INJECTION, SOLUTION INTRAVENOUS CONTINUOUS
Status: DISCONTINUED | OUTPATIENT
Start: 2018-01-15 | End: 2018-01-16

## 2018-01-15 RX ORDER — IPRATROPIUM BROMIDE AND ALBUTEROL SULFATE 2.5; .5 MG/3ML; MG/3ML
3 SOLUTION RESPIRATORY (INHALATION)
Status: DISCONTINUED | OUTPATIENT
Start: 2018-01-15 | End: 2018-01-18 | Stop reason: HOSPADM

## 2018-01-15 RX ADMIN — SODIUM CHLORIDE: 9 INJECTION, SOLUTION INTRAVENOUS at 09:46

## 2018-01-15 RX ADMIN — IPRATROPIUM BROMIDE AND ALBUTEROL SULFATE 3 ML: .5; 3 SOLUTION RESPIRATORY (INHALATION) at 12:25

## 2018-01-15 RX ADMIN — STANDARDIZED SENNA CONCENTRATE AND DOCUSATE SODIUM 2 TABLET: 8.6; 5 TABLET, FILM COATED ORAL at 20:12

## 2018-01-15 RX ADMIN — HEPARIN SODIUM 5000 UNITS: 5000 INJECTION, SOLUTION INTRAVENOUS; SUBCUTANEOUS at 09:45

## 2018-01-15 RX ADMIN — PRAVASTATIN SODIUM 40 MG: 20 TABLET ORAL at 20:12

## 2018-01-15 RX ADMIN — HEPARIN SODIUM 5000 UNITS: 5000 INJECTION, SOLUTION INTRAVENOUS; SUBCUTANEOUS at 20:12

## 2018-01-15 RX ADMIN — LEVETIRACETAM 500 MG: 500 TABLET, FILM COATED ORAL at 20:12

## 2018-01-15 RX ADMIN — STANDARDIZED SENNA CONCENTRATE AND DOCUSATE SODIUM 2 TABLET: 8.6; 5 TABLET, FILM COATED ORAL at 09:45

## 2018-01-15 RX ADMIN — CLOPIDOGREL 75 MG: 75 TABLET, FILM COATED ORAL at 09:47

## 2018-01-15 RX ADMIN — ATENOLOL 50 MG: 50 TABLET ORAL at 09:45

## 2018-01-15 RX ADMIN — IPRATROPIUM BROMIDE AND ALBUTEROL SULFATE 3 ML: .5; 3 SOLUTION RESPIRATORY (INHALATION) at 15:30

## 2018-01-15 RX ADMIN — IPRATROPIUM BROMIDE AND ALBUTEROL SULFATE 3 ML: .5; 3 SOLUTION RESPIRATORY (INHALATION) at 07:51

## 2018-01-15 RX ADMIN — LEVETIRACETAM 500 MG: 500 TABLET, FILM COATED ORAL at 09:45

## 2018-01-15 ASSESSMENT — ENCOUNTER SYMPTOMS
SORE THROAT: 0
NAUSEA: 0
SHORTNESS OF BREATH: 1
COUGH: 1
VOMITING: 0
FEVER: 0
CHILLS: 0

## 2018-01-15 ASSESSMENT — COGNITIVE AND FUNCTIONAL STATUS - GENERAL
MOVING FROM LYING ON BACK TO SITTING ON SIDE OF FLAT BED: A LOT
TURNING FROM BACK TO SIDE WHILE IN FLAT BAD: UNABLE
CLIMB 3 TO 5 STEPS WITH RAILING: TOTAL
WALKING IN HOSPITAL ROOM: TOTAL
MOBILITY SCORE: 8
SUGGESTED CMS G CODE MODIFIER MOBILITY: CM
MOVING TO AND FROM BED TO CHAIR: UNABLE
STANDING UP FROM CHAIR USING ARMS: A LOT

## 2018-01-15 ASSESSMENT — PAIN SCALES - GENERAL
PAINLEVEL_OUTOF10: 0

## 2018-01-15 ASSESSMENT — GAIT ASSESSMENTS: GAIT LEVEL OF ASSIST: UNABLE TO PARTICIPATE

## 2018-01-15 NOTE — PROGRESS NOTES
2 RN Skin Check completed with Kishor:    Pt ears intact and blanching.  Elbow intact and blanching.  Sacrum red and blanching.  Waffle mattress in place.  q2 turns in place. Pt heels are boggy and slow to yamel.  Heels floated using pillows.  Overall skin intact.

## 2018-01-15 NOTE — CARE PLAN
Problem: Venous Thromboembolism (VTW)/Deep Vein Thrombosis (DVT) Prevention:  Goal: Patient will participate in Venous Thrombosis (VTE)/Deep Vein Thrombosis (DVT)Prevention Measures  Outcome: PROGRESSING AS EXPECTED  Pt receiving lovenox injections for VTE.      Problem: Respiratory:  Goal: Respiratory status will improve  Outcome: PROGRESSING AS EXPECTED  Pt on 2L NC.  Pt has expiratory wheezes throughout.  RT providing breathing treatments to pt.     Problem: Skin Integrity  Goal: Risk for impaired skin integrity will decrease  Outcome: PROGRESSING AS EXPECTED

## 2018-01-15 NOTE — RESPIRATORY CARE
COPD EDUCATION by COPD CLINICAL EDUCATOR  1/15/2018 at 7:35 AM by Kayy Mak     Patient reviewed by COPD education team. Patient does not qualify for COPD program.

## 2018-01-15 NOTE — ED PROVIDER NOTES
ED Provider Note    CHIEF COMPLAINT  Chief Complaint   Patient presents with   • Cough     To triage per w/c and family,  Reports non productive cough, h/a and sore throat x 5 days, RX tessalon per PCP.  Came in today 2nd to shortness of breath, unable to sleep and cough worsening. Care giver states to family, pt had fever 2 nights ago.  educated in triage. mask on pt.   • Head Ache   • Sore Throat       HPI  Malena Milton is a 77 y.o. female who presents to the emergency department complaining of 5 days of worsening dry cough sore throat and body aches headaches and difficulty sleeping. There are no recognized exacerbating or alleviating factors or precipitating events, the patient is not eating or drinking very well.    REVIEW OF SYSTEMS no hemoptysis no chest pain no abdominal pain. The patient has chronic left-sided weakness after a stroke and this has not changed recently. All other systems negative    PAST MEDICAL HISTORY  Past Medical History:   Diagnosis Date   • CVA (cerebral vascular accident) (CMS-HCC)     Left side residual: LUE weakness, LLE weak contraction. LLE botox injection to knee.    • Hypertension    • Stroke (CMS-HCC) 06/2013       FAMILY HISTORY  History reviewed. No pertinent family history.    SOCIAL HISTORY  Social History     Social History   • Marital status:      Spouse name: N/A   • Number of children: N/A   • Years of education: N/A     Social History Main Topics   • Smoking status: Former Smoker   • Smokeless tobacco: Former User   • Alcohol use No   • Drug use: No   • Sexual activity: Not on file     Other Topics Concern   • Not on file     Social History Narrative   • No narrative on file       SURGICAL HISTORY  History reviewed. No pertinent surgical history.    CURRENT MEDICATIONS  Home Medications     Reviewed by Nasima Christiansen, Pharmacy Intern (Pharmacy Intern) on 01/14/18 at 1152  Med List Status: Complete   Medication Last Dose Status   atenolol (TENORMIN) 50 MG TABS  1/14/2018 Active   benzonatate (TESSALON) 100 MG Cap 1/14/2018 Active   clopidogrel (PLAVIX) 75 MG TABS 1/14/2018 Active   levetiracetam (KEPPRA) 500 MG TABS 1/14/2018 Active   melatonin 3 MG Tab 1/13/2018 Active   pravastatin (PRAVACHOL) 40 MG tablet 1/14/2018 Active   trazodone (DESYREL) 50 MG TABS 1/10/2018 Active                ALLERGIES  No Known Allergies    PHYSICAL EXAM  VITAL SIGNS: /76   Pulse 86   Temp 36.3 °C (97.3 °F)   Resp 18   Ht 1.524 m (5')   Wt 47.6 kg (105 lb)   SpO2 97%   Breastfeeding? No   BMI 20.51 kg/m²    Oxygen saturation is interpreted as hypoxic on room air at 87% but adequate with oxygen by nasal cannula  Constitutional: Frail and ill-appearing  HENT: Mucous membranes are dry throat is clear  Eyes: No erythema or discharge or jaundice  Neck: Trachea midline no JVD  Cardiovascular: Irregular rhythm  Lungs: Clear and equal bilaterally although she does have a dry cough  Abdomen/Back: Soft nontender nondistended no peritoneal findings  Skin: Warm and dry  Musculoskeletal: No leg edema or calf tenderness  Neurologic: Awake and verbal and moving all extremities    Laboratory  CBC shows white blood cell count of 5.7 hemoglobin and crit 15.1 complete metabolic panel is unremarkable lactic acid level is 2.0 troponin is normal influenza testing is negative blood cultures were sent to the lab    EKG interpretation  12-lead EKG shows an irregular rhythm possibly representing sinus rhythm with atrial ectopy but atrial fibrillation or flutter with B within the differential diagnosis. The rate is 98 bpm there is no pathologic ST elevation or depression. The patient's most recent cardiogram from June 20, 2017 shows an atrial paced rhythm    Radiology  CT-CTA CHEST PULMONARY ARTERY W/ RECONS   Final Result      No central or large segmental pulmonary embolus is identified.      Mild bibasilar atelectasis.      4.6 mm right lower lobe pulmonary nodule is not significantly changed compared  to prior. Follow-up CT in 6-12 months is recommended.      Stable elevation of the right hemidiaphragm.      Atherosclerotic plaque.      Bronchial wall thickening can be seen in the setting of bronchitis. Mosaic attenuation can be seen in small airway disease.                  CT-HEAD W/O   Final Result      No acute intracranial abnormality is identified.      Right frontoparietal encephalomalacia is again noted.      Atrophy      There are periventricular and subcortical white matter changes present.  This finding is nonspecific and could be from previous small vessel ischemia, demyelination, or gliosis.      Paranasal sinus disease, most prominent in the right sphenoid sinus.      DX-CHEST-PORTABLE (1 VIEW)   Final Result      Mild bibasilar atelectasis.      Stable elevation of the right hemidiaphragm with interposition of bowel beneath the hemidiaphragm.      Atherosclerotic plaque.          MEDICAL DECISION MAKING and DISPOSITION  In the emergency department an IV was established patient was given intravenous fluids as well as ceftriaxone and azithromycin she was given albuterol and Atrovent by nebulizer. I reviewed the findings available with the patient and her family and I reviewed the case with the hospitalist and the patient will be admitted for further evaluation and treatment    IMPRESSION  1. Upper respiratory tract infection  2. Hypoxia  3. Cardiac arrhythmia         Electronically signed by: Noah Lassiter, 1/15/2018 11:27 AM

## 2018-01-15 NOTE — PROGRESS NOTES
[facility] Hospitalist Progress Note    Date of Service: 1/15/2018    Chief Complaint  77 y.o. female admitted 2018 with acute hypox resp failure/bronchitis/pharyngitis.    Interval Problem Update  Feeling better.  Breathing easier.  Denies sore throat.  Family at bedside.    Consultants/Specialty      Review of Systems   Constitutional: Negative for chills and fever.   HENT: Negative for sore throat.    Respiratory: Positive for cough and shortness of breath.    Gastrointestinal: Negative for nausea and vomiting.   All other systems reviewed and are negative.     Physical Exam  Laboratory/Imaging   Hemodynamics  Temp (24hrs), Av.6 °C (97.9 °F), Min:35.9 °C (96.7 °F), Max:37.4 °C (99.3 °F)   Temperature: 36.3 °C (97.3 °F)  Pulse  Av.3  Min: 75  Max: 127 Heart Rate (Monitored): (!) 118  Blood Pressure : 119/76, NIBP: 122/87      Respiratory      Respiration: 18, Pulse Oximetry: 97 %, O2 Daily Delivery Respiratory : Silicone Nasal Cannula     Given By:: Mask, Work Of Breathing / Effort: Mild  RUL Breath Sounds: Crackles;Expiratory Wheezes, RML Breath Sounds: Crackles;Expiratory Wheezes, RLL Breath Sounds: Diminished, LANA Breath Sounds: Crackles;Expiratory Wheezes, LLL Breath Sounds: Diminished    Fluids    Intake/Output Summary (Last 24 hours) at 01/15/18 1032  Last data filed at 01/15/18 1000   Gross per 24 hour   Intake              480 ml   Output                0 ml   Net              480 ml       Nutrition  Orders Placed This Encounter   Procedures   • DIET ORDER     Standing Status:   Standing     Number of Occurrences:   1     Order Specific Question:   Diet:     Answer:   Cardiac [6]     Physical Exam  Nursing note and vitals reviewed.  Constitutional: She is oriented to person, place, and time. She appears well-developed and   well-nourished.   HENT:   Head: Normocephalic and atraumatic.   Right Ear: External ear normal.   Left Ear: External ear normal.   Nose: Nose normal.   Mouth/Throat:  Oropharynx is small with Mallanpati score of 3.  Mucosa is clear, moist but with slight erythema.   Eyes: Conjunctivae and extraocular motions are normal. Pupils are equal, round, and reactive to light.   Neck: Normal range of motion. Neck supple.   Cardiovascular: Normal rate, regular rhythm, normal heart sounds and intact distal pulses.    Pulmonary/Chest: Effort normal and R-exp rhonchi and L-wheezing.   Abdominal: Soft. Bowel sounds are normal.   Musculoskeletal: Normal range of motion.   Neurological: She is alert and oriented to person, place, and time.   Skin: Skin is warm and dry.       Recent Labs      01/14/18   1010  01/14/18   2347  01/15/18   0907   WBC  5.7  3.0*  3.9*   RBC  4.51  3.95*  4.13*   HEMOGLOBIN  15.1  13.4  14.0   HEMATOCRIT  44.8  39.5  40.2   MCV  99.3*  100.0*  97.3   MCH  33.5*  33.9*  33.9*   MCHC  33.7  33.9  34.8   RDW  45.5  45.1  44.4   PLATELETCT  150*  125*  141*   MPV  10.6  10.8  10.4     Recent Labs      01/14/18   1010  01/14/18   2347  01/15/18   0907   SODIUM  137  141  141   POTASSIUM  3.7  3.5*  3.4*   CHLORIDE  103  110  109   CO2  21  21  24   GLUCOSE  112*  140*  154*   BUN  18  14  12   CREATININE  0.72  0.54  0.60   CALCIUM  8.9  8.0*  8.4*     Recent Labs      01/14/18   1010   APTT  32.6   INR  1.00     Recent Labs      01/14/18   1010   BNPBTYPENAT  30              Assessment/Plan     Acute hypoxemic respiratory failure (CMS-HCC)- (present on admission)   Assessment & Plan    Improving.  Cont O2, RT, IS, Vax.        Pharyngitis- (present on admission)   Assessment & Plan    Sx management.        RAD (reactive airway disease)- (present on admission)   Assessment & Plan    DC prednisone and give gentle IVF.        Bronchiolitis- (present on admission)   Assessment & Plan    Improving.  Cont ceftriaxone for now and DC azithromycin.        Physical debility   Assessment & Plan    PT/OT when acute issues are resolved.        Hypokalemia   Assessment & Plan     Supplement and check Mg.        Acute prerenal azotemia   Assessment & Plan    Follow c NS IVF.        SVT (supraventricular tachycardia) (CMS-HCC)- (present on admission)   Assessment & Plan    Trop neg x 3.  Rate controlled.        HTN (hypertension)   Assessment & Plan    Atenolol        HLD (hyperlipidemia)   Assessment & Plan    Pravastatin        Thrombocytopenia (CMS-[facility])- (present on admission)   Assessment & Plan    Mild, monitor        Pulmonary nodule- (present on admission)   Assessment & Plan    Outpatient surveillance per PCP recommendations recommended        History of stroke   Assessment & Plan    On keppra subsequently  Continue risk factor modifications            Reviewed items::  EKG reviewed, Radiology images reviewed, Labs reviewed and Medications reviewed  Tomas catheter::  No Tomas  DVT prophylaxis pharmacological::  Heparin  Ulcer Prophylaxis::  Not indicated  Antibiotics:  Treating active infection/contamination beyond 24 hours perioperative coverage      Stable issues - med hx (SZ, HLD, HTN, CVA, craniotomy),    Preventives - IS, Vax, stool soft, DVTP.    Dispo - complex/guarded.

## 2018-01-15 NOTE — CARE PLAN
Problem: Safety  Goal: Will remain free from falls  Outcome: PROGRESSING AS EXPECTED  Ana Munguia Fall Risk Assessment:     Last Known Fall: Within the last year  Mobility: Immobilized/requires assist of one person  Medications: Cardiovascular or central nervous system meds  Mental Status/LOC/Awareness: Awake, alert, and oriented to date, place, and person  Toileting Needs: Incontinence  Volume/Electrolyte Status: Use of IV fluids/tube feeds  Communication/Sensory: Visual (Glasses)/hearing deficit, Non-English patient/unable to speak/slurred speech  Behavior: Appropriate behavior  Ana Munguia Fall Risk Total: 15  Fall Risk Level: HIGH RISK    Universal Fall Precautions:  call light/belongings in reach, bed in low position and locked, use non-slip footwear, siderails up x 2, adequate lighting    Fall Risk Level Interventions:    TRIAL ([facility], NEURO, MED MONI 5) Moderate Fall Risk Interventions  Place yellow fall risk ID band on patient: verified  Provide patient/family education based on risk assessment : completed  Educate patient/family to call staff for assistance when getting out of bed: completed  Place fall precaution signage outside patient door: verified  Utilize bed/chair fall alarm: verifiedTRIAL ([facility], NEURO, MED MONI 5) High Fall Risk Interventions  Place yellow fall risk ID band on patient: verified  Provide patient/family education based on risk assessment: completed  Educate patient/family to call staff for assistance when getting out of bed: completed  Place fall precaution signage outside patient door: verified  Place patient in room close to nursing station: currently not available/charge notified  Utilize bed/chair fall alarm: completed  Notify charge of high risk for huddle: completed    Patient Specific Interventions:     Medication: review medications with patient and family  Mental Status/LOC/Awareness: reinforce falls education, encourage family to stay with patient and check on  patient hourly  Toileting: provide frquent toileting and monitor intake and output/use of appropriate interventions  Volume/Electrolyte Status: ensure patient remains hydrated and monitor abnormal lab values  Communication/Sensory: update plan of care on whiteboard  Behavioral: encourage patient to voice feelings and engage patient in daily activities  Mobility: utilize bed/chair fall alarm and ensure bed is locked and in lowest position    Problem: Venous Thromboembolism (VTW)/Deep Vein Thrombosis (DVT) Prevention:  Goal: Patient will participate in Venous Thrombosis (VTE)/Deep Vein Thrombosis (DVT)Prevention Measures  Outcome: PROGRESSING AS EXPECTED

## 2018-01-15 NOTE — H&P
Hospital Medicine History and Physical    Date of Service  1/14/2018    Chief Complaint  Chief Complaint   Patient presents with   • Cough     To triage per w/c and family,  Reports non productive cough, h/a and sore throat x 5 days, RX tessalon per PCP.  Came in today 2nd to shortness of breath, unable to sleep and cough worsening. Care giver states to family, pt had fever 2 nights ago.  educated in triage. mask on pt.   • Head Ache   • Sore Throat       History of Presenting Illness  77 y.o. female who presented 1/14/2018 with cough headaches and shortness of breath. Patient lives in a group home. She presents to the emergency room today because of a persistent cough which is dry for the last 5 days. She reports that this is worse to the point that she couldn't sleep last night. There has been associated intermittent headaches. She was febrile two nights ago with subsequent chills but otherwise is afebrile right now. No nausea or vomiting. Reports associated shortness of breath and dyspnea on exertion. No lower extremity swelling but complains of orthopnea and paroxysmal nocturnal dyspnea. Denies any oxygen use at home. In addition complains of sore throat, nasal congestion. Sore throat is most performed on the left side. No chest pain. No abdominal pain diarrhea or constipation or blood in bowel movement.. No urinary complaints. No neck stiffness. No photophobia.   Primary Care Physician  Niko Szymanski P.A.    Consultants  None    Code Status  FULL CODE, discussed with patient    Review of Systems  Review of Systems   Constitutional: Positive for chills, fever and malaise/fatigue. Negative for diaphoresis.   HENT: Positive for congestion and sore throat. Negative for ear discharge, ear pain, hearing loss, nosebleeds, sinus pain and tinnitus.    Eyes: Negative for blurred vision and double vision.   Respiratory: Positive for cough, shortness of breath and wheezing. Negative for hemoptysis, sputum production  and stridor.    Cardiovascular: Positive for orthopnea and PND. Negative for chest pain, palpitations, claudication and leg swelling.   Gastrointestinal: Negative for abdominal pain, blood in stool, constipation, diarrhea, heartburn, melena, nausea and vomiting.   Genitourinary: Negative for dysuria, flank pain, frequency, hematuria and urgency.   Musculoskeletal: Positive for joint pain and myalgias. Negative for back pain, falls and neck pain.   Skin: Negative for itching and rash.   Neurological: Positive for focal weakness (chronic), weakness and headaches. Negative for dizziness, tingling, tremors, sensory change, speech change, seizures and loss of consciousness.   Psychiatric/Behavioral: Negative for depression and suicidal ideas.        Past Medical History  Past Medical History:   Diagnosis Date   • Stroke (CMS-HCC) 06/2013   • CVA (cerebral vascular accident) (CMS-HCC)     Left side residual: LUE weakness, LLE weak contraction. LLE botox injection to knee.    • Hypertension        Surgical History  Prior brain surgery per son after stroke in 2013    Medications  No current facility-administered medications on file prior to encounter.      Current Outpatient Prescriptions on File Prior to Encounter   Medication Sig Dispense Refill   • pravastatin (PRAVACHOL) 40 MG tablet Take 40 mg by mouth every evening.     • melatonin 3 MG Tab Take 3 mg by mouth every bedtime.     • atenolol (TENORMIN) 50 MG TABS Take 50 mg by mouth every day.     • clopidogrel (PLAVIX) 75 MG TABS Take 75 mg by mouth every day.     • trazodone (DESYREL) 50 MG TABS Take 100 mg by mouth at bedtime as needed.     • levetiracetam (KEPPRA) 500 MG TABS Take 500 mg by mouth 2 times a day.         Family History  History reviewed. No pertinent family history.    Social History  Social History   Substance Use Topics   • Smoking status: Former Smoker   • Smokeless tobacco: Former User   • Alcohol use No       Allergies  No Known Allergies      Physical Exam  Laboratory   Hemodynamics  Temp (24hrs), Av °C (98.6 °F), Min:36.3 °C (97.4 °F), Max:37.4 °C (99.3 °F)   Temperature: 36.3 °C (97.4 °F)  Pulse  Av.3  Min: 85  Max: 127 Heart Rate (Monitored): (!) 118  Blood Pressure : 110/81, NIBP: 122/87      Respiratory      Respiration: 17, Pulse Oximetry: 98 %, O2 Daily Delivery Respiratory : Silicone Nasal Cannula     Given By:: Mouthpiece  RUL Breath Sounds: Crackles;Diminished, RML Breath Sounds: Crackles;Diminished, RLL Breath Sounds: Crackles;Diminished, LANA Breath Sounds: Crackles;Diminished, LLL Breath Sounds: Diminished    Physical Exam   Constitutional: She is oriented to person, place, and time. She appears well-developed and well-nourished. No distress.   HENT:   Head: Normocephalic.   Mouth/Throat: Oropharynx is clear and moist. No oropharyngeal exudate.   Pharyngeal erythema is noted without any purulence. Dry mucous membranes.   Eyes: Conjunctivae are normal. Pupils are equal, round, and reactive to light. No scleral icterus.   Neck: No JVD present.   Cardiovascular: Regular rhythm.  Exam reveals no gallop and no friction rub.    Murmur heard.  Tachycardic   Pulmonary/Chest: No stridor. No respiratory distress. She has wheezes. She has rales.   Abdominal: Soft. Bowel sounds are normal. She exhibits no distension. There is no tenderness. There is no rebound and no guarding.   Musculoskeletal: Normal range of motion. She exhibits no edema, tenderness or deformity.   Neurological: She is alert and oriented to person, place, and time. No cranial nerve deficit.   Skin: Skin is warm and dry. She is not diaphoretic.   Psychiatric: She has a normal mood and affect. Her behavior is normal. Judgment and thought content normal.       Recent Labs      18   1010   WBC  5.7   RBC  4.51   HEMOGLOBIN  15.1   HEMATOCRIT  44.8   MCV  99.3*   MCH  33.5*   MCHC  33.7   RDW  45.5   PLATELETCT  150*   MPV  10.6     Recent Labs      18   1010    SODIUM  137   POTASSIUM  3.7   CHLORIDE  103   CO2  21   GLUCOSE  112*   BUN  18   CREATININE  0.72   CALCIUM  8.9     Recent Labs      01/14/18   1010   ALTSGPT  15   ASTSGOT  25   ALKPHOSPHAT  58   TBILIRUBIN  0.7   GLUCOSE  112*     Recent Labs      01/14/18   1010   APTT  32.6   INR  1.00     Recent Labs      01/14/18   1010   BNPBTYPENAT  30         Lab Results   Component Value Date    TROPONINI <0.01 01/14/2018     Urinalysis:  No results found for: SPECGRAVITY, GLUCOSEUR, KETONES, NITRITE, WBCURINE, RBCURINE, BACTERIA, EPITHELCELL     Imaging  Reviewed   Assessment/Plan     I anticipate this patient will require at least two midnights for appropriate medical management, necessitating inpatient admission.    Acute hypoxemic respiratory failure (CMS-HCC)- (present on admission)   Assessment & Plan    With RAD  Suspect viral illness  Continue steroids / BD regimen  Continue close clinical monitoring  There is orthopnea, PND, check TTE.  CTA PE is negative        Pharyngitis- (present on admission)   Assessment & Plan    I suspect this is viral  Respiratory viral panel requested  EBV / CMV evaluation requested  Influenza negative  RSV ?   Pending pharyngeal strep swab  Maintain droplet precautions at this time  Consider ID consultation for evaluation        RAD (reactive airway disease)- (present on admission)   Assessment & Plan    I suspect this is viral  Respiratory viral panel requested  EBV / CMV evaluation requested  Influenza negative  RSV ?   Maintain droplet precautions at this time  BD regimen  X1 solumedrol given  Prednisone PO, anticipate to stop over the coming days        Bronchiolitis- (present on admission)   Assessment & Plan    I suspect this is viral  Respiratory viral panel requested  EBV / CMV evaluation requested  Influenza negative  RSV ?   Maintain droplet precautions at this time  Consider ID consultation for evaluation  Continue IV ceftriaxone / Azithromycin at this time  Supportive  measures to continue        SVT (supraventricular tachycardia) (CMS-HCC)- (present on admission)   Assessment & Plan    From viral illness  Monitor on telemetry  Cycle troponin's        HTN (hypertension)- (present on admission)   Assessment & Plan    Atenolol        HLD (hyperlipidemia)- (present on admission)   Assessment & Plan    Pravastatin        Monocytosis- (present on admission)   Assessment & Plan    I suspect this is viral illness related  If persistent consider hematology evaluation        Thrombocytopenia (CMS-HCC)- (present on admission)   Assessment & Plan    Mild, monitor        Pulmonary nodule- (present on admission)   Assessment & Plan    Outpatient surveillance per PCP recommendations recommended        History of stroke- (present on admission)   Assessment & Plan    On keppra subsequently  Continue risk factor modifications            VTE prophylaxis: SCD and SC Lovenox.

## 2018-01-15 NOTE — PROGRESS NOTES
Bedside report received from day shift nurse. Patient A&O x4. Pt has left sided weakness from previous stroke.  2L NC. ST on the monitor. No complaints of pain at this time. POC discussed with patient. Patient verbalized understanding. Call light and belongings within reach. Bed locked and in lowest position, alarm and fall precautions in place. Son at bedside.

## 2018-01-16 LAB
ANION GAP SERPL CALC-SCNC: 7 MMOL/L (ref 0–11.9)
BACTERIA SPEC RESP CULT: NORMAL
BACTERIA UR CULT: NORMAL
BUN SERPL-MCNC: 11 MG/DL (ref 8–22)
CALCIUM SERPL-MCNC: 8.3 MG/DL (ref 8.5–10.5)
CHLORIDE SERPL-SCNC: 113 MMOL/L (ref 96–112)
CMV IGG SERPL IA-ACNC: 7.9 U/ML
CMV IGM SERPL IA-ACNC: <8 AU/ML
CO2 SERPL-SCNC: 22 MMOL/L (ref 20–33)
CREAT SERPL-MCNC: 0.62 MG/DL (ref 0.5–1.4)
EBV EA-D IGG SER-ACNC: <5 U/ML (ref 0–10.9)
EBV NA IGG SER IA-ACNC: 187 U/ML (ref 0–21.9)
EBV VCA IGG SER IA-ACNC: >750 U/ML (ref 0–21.9)
EBV VCA IGM SER IA-ACNC: <10 U/ML (ref 0–43.9)
ERYTHROCYTE [DISTWIDTH] IN BLOOD BY AUTOMATED COUNT: 46.2 FL (ref 35.9–50)
FLUAV H1 2009 PAND RNA SPEC QL NAA+PROBE: NOT DETECTED
FLUAV H1 RNA SPEC QL NAA+PROBE: NOT DETECTED
FLUAV H3 RNA SPEC QL NAA+PROBE: NOT DETECTED
FLUAV RNA SPEC QL NAA+PROBE: NOT DETECTED
FLUBV RNA SPEC QL NAA+PROBE: NOT DETECTED
GLUCOSE SERPL-MCNC: 93 MG/DL (ref 65–99)
GRAM STN SPEC: NORMAL
HADV DNA SPEC QL NAA+PROBE: NOT DETECTED
HADV DNA SPEC QL NAA+PROBE: NOT DETECTED
HCT VFR BLD AUTO: 37.4 % (ref 37–47)
HGB BLD-MCNC: 12.8 G/DL (ref 12–16)
HMPV RNA SPEC QL NAA+PROBE: NOT DETECTED
HPIV1 RNA SPEC QL NAA+PROBE: NOT DETECTED
HPIV2 RNA SPEC QL NAA+PROBE: NOT DETECTED
HPIV3 RNA SPEC QL NAA+PROBE: NOT DETECTED
L PNEUMO IGG TITR SER IF: NORMAL {TITER}
MCH RBC QN AUTO: 34.2 PG (ref 27–33)
MCHC RBC AUTO-ENTMCNC: 34.2 G/DL (ref 33.6–35)
MCV RBC AUTO: 100 FL (ref 81.4–97.8)
PLATELET # BLD AUTO: 139 K/UL (ref 164–446)
PMV BLD AUTO: 10.6 FL (ref 9–12.9)
POTASSIUM SERPL-SCNC: 3.4 MMOL/L (ref 3.6–5.5)
RBC # BLD AUTO: 3.74 M/UL (ref 4.2–5.4)
RHINOVIRUS RNA SPEC QL NAA+PROBE: NOT DETECTED
RSV A RNA SPEC QL NAA+PROBE: DETECTED
RSV B RNA SPEC QL NAA+PROBE: NOT DETECTED
SIGNIFICANT IND 70042: NORMAL
SIGNIFICANT IND 70042: NORMAL
SITE SITE: NORMAL
SITE SITE: NORMAL
SODIUM SERPL-SCNC: 142 MMOL/L (ref 135–145)
SOURCE SOURCE: NORMAL
SOURCE SOURCE: NORMAL
WBC # BLD AUTO: 5.9 K/UL (ref 4.8–10.8)

## 2018-01-16 PROCEDURE — 700101 HCHG RX REV CODE 250: Performed by: INTERNAL MEDICINE

## 2018-01-16 PROCEDURE — 36415 COLL VENOUS BLD VENIPUNCTURE: CPT

## 2018-01-16 PROCEDURE — G8987 SELF CARE CURRENT STATUS: HCPCS | Mod: CK

## 2018-01-16 PROCEDURE — 97165 OT EVAL LOW COMPLEX 30 MIN: CPT

## 2018-01-16 PROCEDURE — 700102 HCHG RX REV CODE 250 W/ 637 OVERRIDE(OP): Performed by: INTERNAL MEDICINE

## 2018-01-16 PROCEDURE — A9270 NON-COVERED ITEM OR SERVICE: HCPCS | Performed by: INTERNAL MEDICINE

## 2018-01-16 PROCEDURE — 85027 COMPLETE CBC AUTOMATED: CPT

## 2018-01-16 PROCEDURE — 94760 N-INVAS EAR/PLS OXIMETRY 1: CPT

## 2018-01-16 PROCEDURE — 94640 AIRWAY INHALATION TREATMENT: CPT

## 2018-01-16 PROCEDURE — G8988 SELF CARE GOAL STATUS: HCPCS | Mod: CK

## 2018-01-16 PROCEDURE — 99232 SBSQ HOSP IP/OBS MODERATE 35: CPT | Performed by: HOSPITALIST

## 2018-01-16 PROCEDURE — G8989 SELF CARE D/C STATUS: HCPCS | Mod: CK

## 2018-01-16 PROCEDURE — 700102 HCHG RX REV CODE 250 W/ 637 OVERRIDE(OP): Performed by: HOSPITALIST

## 2018-01-16 PROCEDURE — 700111 HCHG RX REV CODE 636 W/ 250 OVERRIDE (IP): Performed by: HOSPITALIST

## 2018-01-16 PROCEDURE — A9270 NON-COVERED ITEM OR SERVICE: HCPCS | Performed by: HOSPITALIST

## 2018-01-16 PROCEDURE — 80048 BASIC METABOLIC PNL TOTAL CA: CPT

## 2018-01-16 PROCEDURE — 700105 HCHG RX REV CODE 258: Performed by: HOSPITALIST

## 2018-01-16 PROCEDURE — 770020 HCHG ROOM/CARE - TELE (206)

## 2018-01-16 RX ORDER — POTASSIUM CHLORIDE 20 MEQ/1
20 TABLET, EXTENDED RELEASE ORAL ONCE
Status: COMPLETED | OUTPATIENT
Start: 2018-01-16 | End: 2018-01-16

## 2018-01-16 RX ADMIN — SODIUM CHLORIDE: 9 INJECTION, SOLUTION INTRAVENOUS at 08:51

## 2018-01-16 RX ADMIN — LEVETIRACETAM 500 MG: 500 TABLET, FILM COATED ORAL at 20:54

## 2018-01-16 RX ADMIN — POTASSIUM CHLORIDE 20 MEQ: 1500 TABLET, EXTENDED RELEASE ORAL at 16:18

## 2018-01-16 RX ADMIN — CLOPIDOGREL 75 MG: 75 TABLET, FILM COATED ORAL at 08:43

## 2018-01-16 RX ADMIN — STANDARDIZED SENNA CONCENTRATE AND DOCUSATE SODIUM 2 TABLET: 8.6; 5 TABLET, FILM COATED ORAL at 08:42

## 2018-01-16 RX ADMIN — ATENOLOL 50 MG: 50 TABLET ORAL at 08:43

## 2018-01-16 RX ADMIN — IPRATROPIUM BROMIDE AND ALBUTEROL SULFATE 3 ML: .5; 3 SOLUTION RESPIRATORY (INHALATION) at 16:30

## 2018-01-16 RX ADMIN — HEPARIN SODIUM 5000 UNITS: 5000 INJECTION, SOLUTION INTRAVENOUS; SUBCUTANEOUS at 20:56

## 2018-01-16 RX ADMIN — HEPARIN SODIUM 5000 UNITS: 5000 INJECTION, SOLUTION INTRAVENOUS; SUBCUTANEOUS at 08:43

## 2018-01-16 RX ADMIN — LEVETIRACETAM 500 MG: 500 TABLET, FILM COATED ORAL at 08:43

## 2018-01-16 RX ADMIN — PRAVASTATIN SODIUM 40 MG: 20 TABLET ORAL at 20:54

## 2018-01-16 ASSESSMENT — COGNITIVE AND FUNCTIONAL STATUS - GENERAL
DAILY ACTIVITIY SCORE: 16
DRESSING REGULAR UPPER BODY CLOTHING: A LITTLE
SUGGESTED CMS G CODE MODIFIER DAILY ACTIVITY: CK
PERSONAL GROOMING: A LITTLE
HELP NEEDED FOR BATHING: A LITTLE
DRESSING REGULAR LOWER BODY CLOTHING: A LOT
TOILETING: TOTAL

## 2018-01-16 ASSESSMENT — ENCOUNTER SYMPTOMS
FLANK PAIN: 0
PND: 0
LOSS OF CONSCIOUSNESS: 0
STRIDOR: 0
SEIZURES: 0
EYES NEGATIVE: 1
DEPRESSION: 0
DIZZINESS: 0
EYE PAIN: 0
WHEEZING: 0
BRUISES/BLEEDS EASILY: 0
NAUSEA: 0
HEADACHES: 0
ABDOMINAL PAIN: 0
POLYDIPSIA: 0
NERVOUS/ANXIOUS: 0
VOMITING: 0
SHORTNESS OF BREATH: 1
HALLUCINATIONS: 0
PSYCHIATRIC NEGATIVE: 1
NEUROLOGICAL NEGATIVE: 1
FALLS: 0
DIARRHEA: 0
CONSTITUTIONAL NEGATIVE: 1
TINGLING: 0
BLOOD IN STOOL: 0
PHOTOPHOBIA: 0
SPEECH CHANGE: 0
DIAPHORESIS: 0
TREMORS: 0
SPUTUM PRODUCTION: 0
SORE THROAT: 0
NECK PAIN: 0
CHILLS: 0
WEIGHT LOSS: 0
INSOMNIA: 0
CARDIOVASCULAR NEGATIVE: 1
MUSCULOSKELETAL NEGATIVE: 1
HEARTBURN: 0
DOUBLE VISION: 0
CONSTIPATION: 0
PALPITATIONS: 0
COUGH: 1
FOCAL WEAKNESS: 0
SINUS PAIN: 0
ORTHOPNEA: 0
CLAUDICATION: 0
MYALGIAS: 0
BACK PAIN: 0
WEAKNESS: 0
EYE REDNESS: 0
GASTROINTESTINAL NEGATIVE: 1
MEMORY LOSS: 0
SENSORY CHANGE: 0
HEMOPTYSIS: 0
EYE DISCHARGE: 0
BLURRED VISION: 0
FEVER: 0

## 2018-01-16 ASSESSMENT — ACTIVITIES OF DAILY LIVING (ADL): TOILETING: REQUIRES ASSIST

## 2018-01-16 ASSESSMENT — LIFESTYLE VARIABLES: SUBSTANCE_ABUSE: 0

## 2018-01-16 ASSESSMENT — PAIN SCALES - GENERAL
PAINLEVEL_OUTOF10: 0

## 2018-01-16 NOTE — PROGRESS NOTES
Received report and met with patient at bedside. Discussed plan of care for the day. Denies any needs at this time. Bed locked & in lowest position, call light and side table within reach. Son at bedside. Droplet precautions maintained.

## 2018-01-16 NOTE — THERAPY
"Physical Therapy Evaluation completed.   Bed Mobility:  Supine to Sit: Moderate Assist  Transfers: Sit to Stand: Minimal Assist  Gait: Level Of Assist: Unable to Participate with Wheelchair       Plan of Care: Patient with no further skilled PT needs in the acute care setting at this time  Discharge Recommendations: Equipment: No Equipment Needed. Post-acute therapy Discharge to a transitional care facility for continued skilled therapy services.    See \"Rehab Therapy-Acute\" Patient Summary Report for complete documentation.     Pt. presented to PT for primary risk reduction for LOB/falling. Pt. presented with imparied balance, imparied gait, weakness, imparied coordination, decreased muscle length, and decreased activity tolerance. Pt. is currently at baseline with her functional mobility and has been w/c bound and requiring assist for mobility for the past 3 years as reporte per pt. and son. Pt. was able to demonstrate Mod A for bed and transfer mobility w/ no AD. Pt. and son report she also needs assist for w/c push and she is unable to push herself, however, this is at baseline. As pt. is currently at her baseline functional mobility and reports of no concerns at this time, pt. will not benefit from continued acute skilled PT at this time. Anticipate pt. to d/c back to group home once medically cleared.   "

## 2018-01-16 NOTE — PROGRESS NOTES
[facility]    Date of Service: 2018    Chief Complaint  77 y.o. female admitted 2018 with acute hypox resp failure/bronchitis/pharyngitis.    Interval Problem Update  Ongoing cough, productive of clear sputum, sob improving, denies pain, reports generalized malaise    Consultants/Specialty      Review of Systems   Constitutional: Negative.  Negative for chills, diaphoresis, fever, malaise/fatigue and weight loss.   HENT: Negative.  Negative for congestion, ear discharge, ear pain, hearing loss, nosebleeds, sinus pain, sore throat and tinnitus.    Eyes: Negative.  Negative for blurred vision, double vision, photophobia, pain, discharge and redness.   Respiratory: Positive for cough and shortness of breath. Negative for hemoptysis, sputum production, wheezing and stridor.    Cardiovascular: Negative.  Negative for chest pain, palpitations, orthopnea, claudication, leg swelling and PND.   Gastrointestinal: Negative.  Negative for abdominal pain, blood in stool, constipation, diarrhea, heartburn, melena, nausea and vomiting.   Genitourinary: Negative.  Negative for dysuria, flank pain, frequency, hematuria and urgency.   Musculoskeletal: Negative.  Negative for back pain, falls, joint pain, myalgias and neck pain.   Skin: Negative.  Negative for itching and rash.   Neurological: Negative.  Negative for dizziness, tingling, tremors, sensory change, speech change, focal weakness, seizures, loss of consciousness, weakness and headaches.   Endo/Heme/Allergies: Negative.  Negative for environmental allergies and polydipsia. Does not bruise/bleed easily.   Psychiatric/Behavioral: Negative.  Negative for depression, hallucinations, memory loss, substance abuse and suicidal ideas. The patient is not nervous/anxious and does not have insomnia.    All other systems reviewed and are negative.     Physical Exam  Laboratory/Imaging   Hemodynamics  Temp (24hrs), Av.4 °C (97.5 °F), Min:36.1 °C (96.9  °F), Max:36.8 °C (98.2 °F)   Temperature: 36.8 °C (98.2 °F)  Pulse  Av.7  Min: 74  Max: 127   Blood Pressure : 135/95      Respiratory      Respiration: 16, Pulse Oximetry: 96 %, O2 Daily Delivery Respiratory : Room Air with O2 Available     Given By:: Mask, Work Of Breathing / Effort: Mild  RUL Breath Sounds: Expiratory Wheezes, RML Breath Sounds: Expiratory Wheezes, RLL Breath Sounds: Expiratory Wheezes, LANA Breath Sounds: Expiratory Wheezes, LLL Breath Sounds: Expiratory Wheezes    Fluids    Intake/Output Summary (Last 24 hours) at 18 0900  Last data filed at 01/15/18 1800   Gross per 24 hour   Intake             1080 ml   Output                0 ml   Net             1080 ml       Nutrition  Orders Placed This Encounter   Procedures   • DIET ORDER     Standing Status:   Standing     Number of Occurrences:   1     Order Specific Question:   Diet:     Answer:   Cardiac [6]     Physical Exam  Nursing note and vitals reviewed.  Constitutional: She is oriented to person, place, and time. She appears well-developed and   well-nourished.   HENT:   Head: Normocephalic and atraumatic.   Right Ear: External ear normal.   Left Ear: External ear normal.   Nose: Nose normal.   Mouth/Throat: Oropharynx is small with Mallanpati score of 3.  Mucosa is clear, moist but with slight erythema.   Eyes: Conjunctivae and extraocular motions are normal. Pupils are equal, round, and reactive to light.   Neck: Normal range of motion. Neck supple.   Cardiovascular: Normal rate, regular rhythm, normal heart sounds and intact distal pulses.    Pulmonary/Chest: Effort normal, decreased breath sounds throughout, exp wheezing R greater than L  Abdominal: Soft. Bowel sounds are normal.   Musculoskeletal: Normal range of motion.   Neurological: She is alert and oriented to person, place, and time.  Mild left sided weakness,  At baseline, chronic L foot brace in place  Skin: Skin is warm and dry.       Recent Labs      18   9006   01/15/18   0907  01/16/18   0142   WBC  3.0*  3.9*  5.9   RBC  3.95*  4.13*  3.74*   HEMOGLOBIN  13.4  14.0  12.8   HEMATOCRIT  39.5  40.2  37.4   MCV  100.0*  97.3  100.0*   MCH  33.9*  33.9*  34.2*   MCHC  33.9  34.8  34.2   RDW  45.1  44.4  46.2   PLATELETCT  125*  141*  139*   MPV  10.8  10.4  10.6     Recent Labs      01/14/18   1010  01/14/18   2347  01/15/18   0907   SODIUM  137  141  141   POTASSIUM  3.7  3.5*  3.4*   CHLORIDE  103  110  109   CO2  21  21  24   GLUCOSE  112*  140*  154*   BUN  18  14  12   CREATININE  0.72  0.54  0.60   CALCIUM  8.9  8.0*  8.4*     Recent Labs      01/14/18   1010   APTT  32.6   INR  1.00     Recent Labs      01/14/18   1010   BNPBTYPENAT  30              Assessment/Plan     Acute hypoxemic respiratory failure (CMS-HCC)- (present on admission)   Assessment & Plan    Resolving  Continue current treatment plan        Pharyngitis- (present on admission)   Assessment & Plan    Continue supportive care        RAD (reactive airway disease)- (present on admission)   Assessment & Plan    Mild wheezing  Monitoring off of prednisone  Will stop iv fluids        Bronchiolitis- (present on admission)   Assessment & Plan    Symptoms improving  Hypoxia resolving  Stable off of azithromycin  Continue rocephin           Physical debility   Assessment & Plan    PT/OT eval        Hypokalemia   Assessment & Plan    Will recheck labs        Acute prerenal azotemia   Assessment & Plan    Follow c NS IVF.        SVT (supraventricular tachycardia) (CMS-HCC)- (present on admission)   Assessment & Plan    Trop neg x 3.  Rate controlled.  Echo pending        HTN (hypertension)   Assessment & Plan    Atenolol        HLD (hyperlipidemia)   Assessment & Plan    Pravastatin        Thrombocytopenia (CMS-HCC)- (present on admission)   Assessment & Plan    Mild  Improved since admit  Fluctuating        Pulmonary nodule- (present on admission)   Assessment & Plan    Outpatient surveillance per PCP  recommendations recommended, discussed with patient and her son        History of stroke   Assessment & Plan    On keppra subsequently  Continue risk factor modifications            Reviewed items::  EKG reviewed, Radiology images reviewed, Labs reviewed and Medications reviewed  Tomas catheter::  No Tomas  DVT prophylaxis pharmacological::  Heparin  Ulcer Prophylaxis::  Not indicated  Antibiotics:  Treating active infection/contamination beyond 24 hours perioperative coverage      Stable issues - med hx (SZ, HLD, HTN, CVA, craniotomy),    Preventives - IS, Vax, stool soft, DVTP.    Dispo - complex/guarded.

## 2018-01-16 NOTE — THERAPY
Physical Therapy Contact Note  Reorder received, please refer to the assessment below dated 1/15/18:    Pt. presented to PT for primary risk reduction for LOB/falling. Pt. presented with imparied balance, imparied gait, weakness, imparied coordination, decreased muscle length, and decreased activity tolerance. Pt. is currently at baseline with her functional mobility and has been w/c bound and requiring assist for mobility for the past 3 years as reported per pt. and son. Pt. was able to demonstrate Mod A for bed and transfer mobility w/ no AD. Pt. and son report she also needs assist for w/c push and she is unable to push herself, however, this is at baseline. As pt. is currently at her baseline functional mobility and reports of no concerns at this time, pt. will not benefit from continued acute skilled PT at this time. Anticipate pt. to d/c back to group home once medically cleared.    As above, pt is not a candidate for skilled acute PT as she is at her functional baseline (stable for last 3 years); son assists with stand pivot to wheelchair; pt demonstrating min a squat pivot 1/15. No formal re-evaluation performed.     Aubrie Zamora, PT, DPT Pager: [phone]

## 2018-01-17 LAB
ANION GAP SERPL CALC-SCNC: 8 MMOL/L (ref 0–11.9)
BUN SERPL-MCNC: 7 MG/DL (ref 8–22)
CALCIUM SERPL-MCNC: 8.2 MG/DL (ref 8.5–10.5)
CHLORIDE SERPL-SCNC: 111 MMOL/L (ref 96–112)
CO2 SERPL-SCNC: 23 MMOL/L (ref 20–33)
CREAT SERPL-MCNC: 0.6 MG/DL (ref 0.5–1.4)
GLUCOSE SERPL-MCNC: 98 MG/DL (ref 65–99)
LV EJECT FRACT  99904: 65
LV EJECT FRACT MOD 2C 99903: 69.09
LV EJECT FRACT MOD 4C 99902: 62.91
LV EJECT FRACT MOD BP 99901: 55.33
POTASSIUM SERPL-SCNC: 3.1 MMOL/L (ref 3.6–5.5)
S PYO SPEC QL CULT: NORMAL
SIGNIFICANT IND 70042: NORMAL
SITE SITE: NORMAL
SODIUM SERPL-SCNC: 142 MMOL/L (ref 135–145)
SOURCE SOURCE: NORMAL

## 2018-01-17 PROCEDURE — 80048 BASIC METABOLIC PNL TOTAL CA: CPT

## 2018-01-17 PROCEDURE — A9270 NON-COVERED ITEM OR SERVICE: HCPCS | Performed by: HOSPITALIST

## 2018-01-17 PROCEDURE — 770020 HCHG ROOM/CARE - TELE (206)

## 2018-01-17 PROCEDURE — 700111 HCHG RX REV CODE 636 W/ 250 OVERRIDE (IP): Performed by: HOSPITALIST

## 2018-01-17 PROCEDURE — 700102 HCHG RX REV CODE 250 W/ 637 OVERRIDE(OP): Performed by: HOSPITALIST

## 2018-01-17 PROCEDURE — 99232 SBSQ HOSP IP/OBS MODERATE 35: CPT | Performed by: HOSPITALIST

## 2018-01-17 PROCEDURE — A9270 NON-COVERED ITEM OR SERVICE: HCPCS | Performed by: INTERNAL MEDICINE

## 2018-01-17 PROCEDURE — 700102 HCHG RX REV CODE 250 W/ 637 OVERRIDE(OP): Performed by: INTERNAL MEDICINE

## 2018-01-17 PROCEDURE — 93306 TTE W/DOPPLER COMPLETE: CPT | Mod: 26 | Performed by: INTERNAL MEDICINE

## 2018-01-17 PROCEDURE — 93306 TTE W/DOPPLER COMPLETE: CPT

## 2018-01-17 PROCEDURE — 36415 COLL VENOUS BLD VENIPUNCTURE: CPT

## 2018-01-17 RX ORDER — MAGNESIUM SULFATE HEPTAHYDRATE 40 MG/ML
2 INJECTION, SOLUTION INTRAVENOUS ONCE
Status: COMPLETED | OUTPATIENT
Start: 2018-01-17 | End: 2018-01-17

## 2018-01-17 RX ORDER — POTASSIUM CHLORIDE 20 MEQ/1
40 TABLET, EXTENDED RELEASE ORAL ONCE
Status: COMPLETED | OUTPATIENT
Start: 2018-01-17 | End: 2018-01-17

## 2018-01-17 RX ADMIN — LEVETIRACETAM 500 MG: 500 TABLET, FILM COATED ORAL at 07:53

## 2018-01-17 RX ADMIN — ATENOLOL 50 MG: 50 TABLET ORAL at 07:53

## 2018-01-17 RX ADMIN — HEPARIN SODIUM 5000 UNITS: 5000 INJECTION, SOLUTION INTRAVENOUS; SUBCUTANEOUS at 20:10

## 2018-01-17 RX ADMIN — POTASSIUM CHLORIDE 40 MEQ: 1500 TABLET, EXTENDED RELEASE ORAL at 17:33

## 2018-01-17 RX ADMIN — CLOPIDOGREL 75 MG: 75 TABLET, FILM COATED ORAL at 07:53

## 2018-01-17 RX ADMIN — PRAVASTATIN SODIUM 40 MG: 20 TABLET ORAL at 20:09

## 2018-01-17 RX ADMIN — HEPARIN SODIUM 5000 UNITS: 5000 INJECTION, SOLUTION INTRAVENOUS; SUBCUTANEOUS at 07:53

## 2018-01-17 RX ADMIN — LEVETIRACETAM 500 MG: 500 TABLET, FILM COATED ORAL at 20:09

## 2018-01-17 RX ADMIN — MAGNESIUM SULFATE IN WATER 2 G: 40 INJECTION, SOLUTION INTRAVENOUS at 17:33

## 2018-01-17 ASSESSMENT — ENCOUNTER SYMPTOMS
HEARTBURN: 0
EYES NEGATIVE: 1
PHOTOPHOBIA: 0
WEAKNESS: 0
SHORTNESS OF BREATH: 1
HALLUCINATIONS: 0
SEIZURES: 0
CARDIOVASCULAR NEGATIVE: 1
DIZZINESS: 0
VOMITING: 0
FALLS: 0
FEVER: 0
PND: 0
LOSS OF CONSCIOUSNESS: 0
EYE REDNESS: 0
MYALGIAS: 0
INSOMNIA: 0
NEUROLOGICAL NEGATIVE: 1
PALPITATIONS: 0
MEMORY LOSS: 0
BRUISES/BLEEDS EASILY: 0
FOCAL WEAKNESS: 0
CLAUDICATION: 0
NAUSEA: 0
SORE THROAT: 0
MUSCULOSKELETAL NEGATIVE: 1
CHILLS: 0
WEIGHT LOSS: 0
DIARRHEA: 0
SPUTUM PRODUCTION: 0
DOUBLE VISION: 0
CONSTITUTIONAL NEGATIVE: 1
PSYCHIATRIC NEGATIVE: 1
FLANK PAIN: 0
POLYDIPSIA: 0
BLURRED VISION: 0
NERVOUS/ANXIOUS: 0
SINUS PAIN: 0
GASTROINTESTINAL NEGATIVE: 1
COUGH: 1
EYE PAIN: 0
TINGLING: 0
DEPRESSION: 0
DIAPHORESIS: 0
SENSORY CHANGE: 0
BACK PAIN: 0
HEMOPTYSIS: 0
HEADACHES: 0
SPEECH CHANGE: 0
STRIDOR: 0
CONSTIPATION: 0
TREMORS: 0
ABDOMINAL PAIN: 0
NECK PAIN: 0
ORTHOPNEA: 0
BLOOD IN STOOL: 0
WHEEZING: 0
EYE DISCHARGE: 0

## 2018-01-17 ASSESSMENT — PAIN SCALES - GENERAL
PAINLEVEL_OUTOF10: 0

## 2018-01-17 ASSESSMENT — LIFESTYLE VARIABLES: SUBSTANCE_ABUSE: 0

## 2018-01-17 NOTE — THERAPY
"Occupational Therapy Evaluation completed.   Functional Status: Mod A supine > < EOB, mod A stand-pivot transfer, set-up seated grooming, max A LB dressing   Plan of Care: Patient with no further skilled OT needs in the acute care setting at this time  Discharge Recommendations:  Equipment: No Equipment Needed. Post-acute therapy Discharge to home with outpatient or home health for additional skilled therapy services.    See \"Rehab Therapy-Acute\" Patient Summary Report for complete documentation.    77 y.o. female with h/o HTN, CVA with L-sided deficits who presented from group home with cough. Dx with acute respiratory failure, RAD, bronchiolitis, SVT. Pt seen now for OT eval. Pt completed bed mobility, transfer to , seated oral care at sink, transfer back to bed. Pt has assist with all transfers, WC propulsion, toileting, bathing, UB/LB dressing at group home. She appears to be at baseline function from OT standpoint. No further acute OT needs at this time. May benefit from HH OT on DC to maximize functional independence and safety in home setting.     "

## 2018-01-17 NOTE — PROGRESS NOTES
Assumed pt care at 1900, bedside report received. Pt in no distress.  Denies any chest pain or SOB.  Pt appears anxious, calling out for son.  No further complaints.  Bed locked in lowest position and call light within reach.  Will continue to monitor and follow plan of care.

## 2018-01-17 NOTE — CARE PLAN
Problem: Safety  Goal: Will remain free from falls  Outcome: PROGRESSING AS EXPECTED  Discussed safety precautions with pt and son at bedside. Pt and son verbalized understanding. Call light placed within reach.     Problem: Knowledge Deficit  Goal: Knowledge of disease process/condition, treatment plan, diagnostic tests, and medications will improve  Outcome: PROGRESSING AS EXPECTED  Discussed POC and daily goals with pt and son.

## 2018-01-17 NOTE — CARE PLAN
Problem: Safety  Goal: Will remain free from falls  Outcome: PROGRESSING AS EXPECTED  Fall precautions are in place    Problem: Bowel/Gastric:  Goal: Normal bowel function is maintained or improved  Outcome: PROGRESSING AS EXPECTED  Pt has been having soft stools, refused stool softener    Problem: Skin Integrity  Goal: Risk for impaired skin integrity will decrease    Intervention: Implement precautions to protect skin integrity in collaboration with the interdisciplinary team  Pt has been turned and repositioned q2h and pt on waffle overlay

## 2018-01-18 VITALS
HEIGHT: 60 IN | SYSTOLIC BLOOD PRESSURE: 120 MMHG | WEIGHT: 99.21 LBS | HEART RATE: 130 BPM | BODY MASS INDEX: 19.48 KG/M2 | OXYGEN SATURATION: 95 % | TEMPERATURE: 98.6 F | RESPIRATION RATE: 20 BRPM | DIASTOLIC BLOOD PRESSURE: 91 MMHG

## 2018-01-18 PROBLEM — N19 ACUTE PRERENAL AZOTEMIA: Status: RESOLVED | Noted: 2018-01-15 | Resolved: 2018-01-18

## 2018-01-18 PROBLEM — J96.01 ACUTE HYPOXEMIC RESPIRATORY FAILURE (HCC): Status: RESOLVED | Noted: 2018-01-14 | Resolved: 2018-01-18

## 2018-01-18 PROBLEM — E87.6 HYPOKALEMIA: Status: RESOLVED | Noted: 2018-01-15 | Resolved: 2018-01-18

## 2018-01-18 PROBLEM — J45.909 RAD (REACTIVE AIRWAY DISEASE): Status: RESOLVED | Noted: 2018-01-14 | Resolved: 2018-01-18

## 2018-01-18 LAB
ANION GAP SERPL CALC-SCNC: 8 MMOL/L (ref 0–11.9)
BUN SERPL-MCNC: 11 MG/DL (ref 8–22)
CALCIUM SERPL-MCNC: 8.4 MG/DL (ref 8.5–10.5)
CHLORIDE SERPL-SCNC: 111 MMOL/L (ref 96–112)
CO2 SERPL-SCNC: 21 MMOL/L (ref 20–33)
CREAT SERPL-MCNC: 0.54 MG/DL (ref 0.5–1.4)
GLUCOSE SERPL-MCNC: 108 MG/DL (ref 65–99)
POTASSIUM SERPL-SCNC: 3.9 MMOL/L (ref 3.6–5.5)
SODIUM SERPL-SCNC: 140 MMOL/L (ref 135–145)

## 2018-01-18 PROCEDURE — 80048 BASIC METABOLIC PNL TOTAL CA: CPT

## 2018-01-18 PROCEDURE — 700102 HCHG RX REV CODE 250 W/ 637 OVERRIDE(OP): Performed by: INTERNAL MEDICINE

## 2018-01-18 PROCEDURE — A9270 NON-COVERED ITEM OR SERVICE: HCPCS | Performed by: INTERNAL MEDICINE

## 2018-01-18 PROCEDURE — 99239 HOSP IP/OBS DSCHRG MGMT >30: CPT | Performed by: HOSPITALIST

## 2018-01-18 PROCEDURE — 700111 HCHG RX REV CODE 636 W/ 250 OVERRIDE (IP): Performed by: HOSPITALIST

## 2018-01-18 PROCEDURE — 36415 COLL VENOUS BLD VENIPUNCTURE: CPT

## 2018-01-18 RX ADMIN — ATENOLOL 50 MG: 50 TABLET ORAL at 08:13

## 2018-01-18 RX ADMIN — CLOPIDOGREL 75 MG: 75 TABLET, FILM COATED ORAL at 08:13

## 2018-01-18 RX ADMIN — LEVETIRACETAM 500 MG: 500 TABLET, FILM COATED ORAL at 08:13

## 2018-01-18 RX ADMIN — HEPARIN SODIUM 5000 UNITS: 5000 INJECTION, SOLUTION INTRAVENOUS; SUBCUTANEOUS at 08:12

## 2018-01-18 ASSESSMENT — PAIN SCALES - GENERAL
PAINLEVEL_OUTOF10: 0

## 2018-01-18 NOTE — DISCHARGE SUMMARY
CHIEF COMPLAINT ON ADMISSION  Chief Complaint   Patient presents with   • Cough     To triage per w/c and family,  Reports non productive cough, h/a and sore throat x 5 days, RX tessalon per PCP.  Came in today 2nd to shortness of breath, unable to sleep and cough worsening. Care giver states to family, pt had fever 2 nights ago.  educated in triage. mask on pt.   • Head Ache   • Sore Throat       CODE STATUS  Full Code    HPI & HOSPITAL COURSE  Please see history and physical dictated on 1/14/18 by Dr. Li for further details.  This is a 77 y.o. female here with cough and shortness of breath and hypoxia.    She had evidence of reactive airway disease with suspected viral respiratory infection and bronchitis with pharangitis.  Initial antibiotics were stopped and her presenting symptoms resolved with supportive care.  She was found to have intermittent non sustained SVT.  Echo cardiogram and troponins were unremarkable and she had no symptoms during these episodes.  Her previously noted right sided lung mass was unchanged.  Plan to continue outpatient monitoring of this mass with PCP and repeat imaging as well as outpatient cardiology evaluation for intermittent  SVT was discussed.  Patient chronic debility due to previous stroke was stable.    Ongoing intermittent monitoring of mild thrombocytopenia was also recommended with pcp.    Therefore, she is discharged in fair and stable condition with close outpatient follow-up.    SPECIFIC OUTPATIENT FOLLOW-UP  See below    DISCHARGE PROBLEM LIST  Active Problems:    Bronchiolitis POA: Yes    Pharyngitis POA: Yes    Pulmonary nodule POA: Yes    Thrombocytopenia (CMS-HCC) POA: Yes    SVT (supraventricular tachycardia) (CMS-HCC) POA: Yes    Physical debility POA: Unknown  Resolved Problems:    Acute hypoxemic respiratory failure (CMS-HCC) POA: Yes    RAD (reactive airway disease) POA: Yes    Acute prerenal azotemia POA: Unknown    Hypokalemia POA: Unknown      FOLLOW  UP  No future appointments.  JOSE ELIAS Becerra  [address]  [address]  [place] [address]  [phone]    In 2 weeks  [facility]  left a mssg with your provider regarding need for a follow up appointment. Please call their office directly if you do not hear from them with in 2 days. Thank you    Cristofer Cloud M.D.  [address]  [address]  [facility] [address]  [phone]    In 3 weeks        MEDICATIONS ON DISCHARGE   Malena Milton   Home Medication Instructions ANABELLE:[id number]    Printed on:01/18/18 1035   Medication Information                      atenolol (TENORMIN) 50 MG TABS  Take 50 mg by mouth every day.             benzonatate (TESSALON) 100 MG Cap  Take 100 mg by mouth 3 times a day as needed for Cough.             clopidogrel (PLAVIX) 75 MG TABS  Take 75 mg by mouth every day.             levetiracetam (KEPPRA) 500 MG TABS  Take 500 mg by mouth 2 times a day.             melatonin 3 MG Tab  Take 3 mg by mouth every bedtime.             pravastatin (PRAVACHOL) 40 MG tablet  Take 40 mg by mouth every evening.             trazodone (DESYREL) 50 MG TABS  Take 100 mg by mouth at bedtime as needed.               Physical Exam  Nursing note and vitals reviewed.  Constitutional: She is oriented to person, place, and time. She appears well-developed and   well-nourished.   HENT:   Head: Normocephalic and atraumatic.   Right Ear: External ear normal.   Left Ear: External ear normal.   Nose: Nose normal.   Mouth/Throat: Oropharynx is small with Mallanpati score of 3.  Mucosa is clear, moist but with slight erythema.   Eyes: Conjunctivae and extraocular motions are normal. Pupils are equal, round, and reactive to light.   Neck: Normal range of motion. Neck supple.   Cardiovascular: Normal rate, regular rhythm, normal heart sounds and intact distal pulses.    Pulmonary/Chest: Effort normal, decreased breath sounds throughout, exp wheezing R greater than L  Abdominal: Soft. Bowel sounds are normal.  "  Musculoskeletal: Normal range of motion.   Neurological: She is alert and oriented to person, place, and time.  Mild left sided weakness,  At baseline, chronic L foot brace in place  Skin: Skin is warm and dry    DIET  Orders Placed This Encounter   Procedures   • DIET ORDER     Standing Status:   Standing     Number of Occurrences:   1     Order Specific Question:   Diet:     Answer:   Cardiac [6]       ACTIVITY  As tolerated.        CONSULTATIONS  Cardiology as outpatient planned    PROCEDURES  ECHOCARDIOGRAM COMP W/O CONT   Final Result      CT-CTA CHEST PULMONARY ARTERY W/ RECONS   Final Result      No central or large segmental pulmonary embolus is identified.      Mild bibasilar atelectasis.      4.6 mm right lower lobe pulmonary nodule is not significantly changed compared to prior. Follow-up CT in 6-12 months is recommended.      Stable elevation of the right hemidiaphragm.      Atherosclerotic plaque.      Bronchial wall thickening can be seen in the setting of bronchitis. Mosaic attenuation can be seen in small airway disease.                  CT-HEAD W/O   Final Result      No acute intracranial abnormality is identified.      Right frontoparietal encephalomalacia is again noted.      Atrophy      There are periventricular and subcortical white matter changes present.  This finding is nonspecific and could be from previous small vessel ischemia, demyelination, or gliosis.      Paranasal sinus disease, most prominent in the right sphenoid sinus.      DX-CHEST-PORTABLE (1 VIEW)   Final Result      Mild bibasilar atelectasis.      Stable elevation of the right hemidiaphragm with interposition of bowel beneath the hemidiaphragm.      Atherosclerotic plaque.       Echo:     \"No prior study is available for comparison.   Normal left ventricular size and systolic function.  Left ventricular ejection fraction is visually estimated to be 65%.  Mild concentric left ventricular hypertrophy.  Trace mitral " "regurgitation.  Trace tricuspid regurgitation.  Thickening/calcification of the pericardium present.\"    LABORATORY  Lab Results   Component Value Date/Time    SODIUM 140 01/18/2018 03:30 AM    POTASSIUM 3.9 01/18/2018 03:30 AM    CHLORIDE 111 01/18/2018 03:30 AM    CO2 21 01/18/2018 03:30 AM    GLUCOSE 108 (H) 01/18/2018 03:30 AM    BUN 11 01/18/2018 03:30 AM    CREATININE 0.54 01/18/2018 03:30 AM        Lab Results   Component Value Date/Time    WBC 5.9 01/16/2018 01:42 AM    HEMOGLOBIN 12.8 01/16/2018 01:42 AM    HEMATOCRIT 37.4 01/16/2018 01:42 AM    PLATELETCT 139 (L) 01/16/2018 01:42 AM        Total time of the discharge process exceeds 38 minutes  "

## 2018-01-18 NOTE — PROGRESS NOTES
[facility] Hospitalist Progress Note    Date of Service: 1/17/2018    Chief Complaint  77 y.o. female admitted 1/14/2018 with acute hypox resp failure/bronchitis/pharyngitis.    Interval Problem Update  Cough improving, denies sob, no cp, no dizziness, ros otherwise negative    Consultants/Specialty    Dispo  Hopefully home tomorrow if hr stable and electrolytes normalize    Review of Systems   Constitutional: Negative.  Negative for chills, diaphoresis, fever, malaise/fatigue and weight loss.   HENT: Negative.  Negative for congestion, ear discharge, ear pain, hearing loss, nosebleeds, sinus pain, sore throat and tinnitus.    Eyes: Negative.  Negative for blurred vision, double vision, photophobia, pain, discharge and redness.   Respiratory: Positive for cough and shortness of breath. Negative for hemoptysis, sputum production, wheezing and stridor.    Cardiovascular: Negative.  Negative for chest pain, palpitations, orthopnea, claudication, leg swelling and PND.   Gastrointestinal: Negative.  Negative for abdominal pain, blood in stool, constipation, diarrhea, heartburn, melena, nausea and vomiting.   Genitourinary: Negative.  Negative for dysuria, flank pain, frequency, hematuria and urgency.   Musculoskeletal: Negative.  Negative for back pain, falls, joint pain, myalgias and neck pain.   Skin: Negative.  Negative for itching and rash.   Neurological: Negative.  Negative for dizziness, tingling, tremors, sensory change, speech change, focal weakness, seizures, loss of consciousness, weakness and headaches.   Endo/Heme/Allergies: Negative.  Negative for environmental allergies and polydipsia. Does not bruise/bleed easily.   Psychiatric/Behavioral: Negative.  Negative for depression, hallucinations, memory loss, substance abuse and suicidal ideas. The patient is not nervous/anxious and does not have insomnia.    All other systems reviewed and are negative.     Physical Exam  Laboratory/Imaging   Hemodynamics  Temp  (24hrs), Av.3 °C (97.4 °F), Min:36.1 °C (97 °F), Max:36.7 °C (98 °F)   Temperature: 36.3 °C (97.3 °F)  Pulse  Av.1  Min: 74  Max: 127   Blood Pressure : 126/83      Respiratory      Respiration: 18, Pulse Oximetry: 97 %, O2 Daily Delivery Respiratory : Room Air with O2 Available     Given By:: Mouthpiece, Work Of Breathing / Effort: Mild  RUL Breath Sounds: Expiratory Wheezes, RML Breath Sounds: Diminished, RLL Breath Sounds: Diminished, LANA Breath Sounds: Expiratory Wheezes, LLL Breath Sounds: Diminished    Fluids  No intake or output data in the 24 hours ending 18 1612    Nutrition  Orders Placed This Encounter   Procedures   • DIET ORDER     Standing Status:   Standing     Number of Occurrences:   1     Order Specific Question:   Diet:     Answer:   Cardiac [6]     Physical Exam  Nursing note and vitals reviewed.  Constitutional: She is oriented to person, place, and time. She appears well-developed and   well-nourished.   HENT:   Head: Normocephalic and atraumatic.   Right Ear: External ear normal.   Left Ear: External ear normal.   Nose: Nose normal.   Mouth/Throat: Oropharynx is small with Mallanpati score of 3.  Mucosa is clear, moist but with slight erythema.   Eyes: Conjunctivae and extraocular motions are normal. Pupils are equal, round, and reactive to light.   Neck: Normal range of motion. Neck supple.   Cardiovascular: Normal rate, regular rhythm, normal heart sounds and intact distal pulses.    Pulmonary/Chest: Effort normal, decreased breath sounds throughout, exp wheezing R greater than L  Abdominal: Soft. Bowel sounds are normal.   Musculoskeletal: Normal range of motion.   Neurological: She is alert and oriented to person, place, and time.  Mild left sided weakness,  At baseline, chronic L foot brace in place  Skin: Skin is warm and dry.       Recent Labs      18   2347  01/15/18   0907  18   0142   WBC  3.0*  3.9*  5.9   RBC  3.95*  4.13*  3.74*   HEMOGLOBIN  13.4  14.0   12.8   HEMATOCRIT  39.5  40.2  37.4   MCV  100.0*  97.3  100.0*   MCH  33.9*  33.9*  34.2*   MCHC  33.9  34.8  34.2   RDW  45.1  44.4  46.2   PLATELETCT  125*  141*  139*   MPV  10.8  10.4  10.6     Recent Labs      01/15/18   0907  01/16/18   0142  01/17/18   0257   SODIUM  141  142  142   POTASSIUM  3.4*  3.4*  3.1*   CHLORIDE  109  113*  111   CO2  24  22  23   GLUCOSE  154*  93  98   BUN  12  11  7*   CREATININE  0.60  0.62  0.60   CALCIUM  8.4*  8.3*  8.2*                      Assessment/Plan     Acute hypoxemic respiratory failure (CMS-[facility])- (present on admission)   Assessment & Plan    Resolving  Continue current treatment plan        Pharyngitis- (present on admission)   Assessment & Plan    Continue supportive care        RAD (reactive airway disease)- (present on admission)   Assessment & Plan    Acute exacerbation resolving        Bronchiolitis- (present on admission)   Assessment & Plan    Symptoms improving  Hypoxia resolving  Stable off of antibiotics  Following            Physical debility   Assessment & Plan    PT/OT eval        Hypokalemia   Assessment & Plan    replacing        Acute prerenal azotemia   Assessment & Plan    Resolved        SVT (supraventricular tachycardia) (CMS-HCC)- (present on admission)   Assessment & Plan    Trop neg x 3.  Rate controlled.  Echo pending  Intermittent ST today        HTN (hypertension)   Assessment & Plan    Atenolol        HLD (hyperlipidemia)   Assessment & Plan    Pravastatin        Thrombocytopenia (CMS-[facility])- (present on admission)   Assessment & Plan    Mild  Improved since admit  Fluctuating        Pulmonary nodule- (present on admission)   Assessment & Plan    Outpatient surveillance per PCP recommendations recommended, discussed with patient and her son        History of stroke   Assessment & Plan    On keppra subsequently  Continue risk factor modifications            Reviewed items::  EKG reviewed, Radiology images reviewed, Labs reviewed and  Medications reviewed  Tomas catheter::  No Tomas  DVT prophylaxis pharmacological::  Heparin  Ulcer Prophylaxis::  Not indicated  Antibiotics:  Treating active infection/contamination beyond 24 hours perioperative coverage

## 2018-01-18 NOTE — CARE PLAN
Problem: Venous Thromboembolism (VTW)/Deep Vein Thrombosis (DVT) Prevention:  Goal: Patient will participate in Venous Thrombosis (VTE)/Deep Vein Thrombosis (DVT)Prevention Measures  Outcome: PROGRESSING AS EXPECTED  Pt receiving heparin for VTE prophylaxis    Problem: Respiratory:  Goal: Respiratory status will improve    Intervention: Assess and monitor pulmonary status  Pt denies any shortness of breath and has remained on room air      Problem: Skin Integrity  Goal: Risk for impaired skin integrity will decrease  Outcome: PROGRESSING AS EXPECTED  Pt has been turned q2h and on waffle overlay

## 2018-01-18 NOTE — PROGRESS NOTES
Assumed pt care at 1900.  Pt asleep, in no distress.  Denies any chest pain or SOB.  No further complaints.  Bed locked in lowest position and call light within reach.  Will continue to monitor and follow plan of care.

## 2018-01-18 NOTE — DISCHARGE INSTRUCTIONS
Discharge Instructions    Discharged to home by car with relative. Discharged via wheelchair, hospital escort: Yes.  Special equipment needed: Wheelchair    Be sure to schedule a follow-up appointment with your primary care doctor or any specialists as instructed.     Discharge Plan:   Diet Plan: Discussed  Activity Level: Discussed  Confirmed Follow up Appointment: Appointment Scheduled  Confirmed Symptoms Management: Discussed  Medication Reconciliation Updated: Yes  Influenza Vaccine Indication: Not indicated: Previously immunized this influenza season and > 8 years of age    I understand that a diet low in cholesterol, fat, and sodium is recommended for good health. Unless I have been given specific instructions below for another diet, I accept this instruction as my diet prescription.   Other diet: cardiac    Special Instructions: None    · Is patient discharged on Warfarin / Coumadin?   No     Depression / Suicide Risk    As you are discharged from this [facility] facility, it is important to learn how to keep safe from harming yourself.    Recognize the warning signs:  · Abrupt changes in personality, positive or negative- including increase in energy   · Giving away possessions  · Change in eating patterns- significant weight changes-  positive or negative  · Change in sleeping patterns- unable to sleep or sleeping all the time   · Unwillingness or inability to communicate  · Depression  · Unusual sadness, discouragement and loneliness  · Talk of wanting to die  · Neglect of personal appearance   · Rebelliousness- reckless behavior  · Withdrawal from people/activities they love  · Confusion- inability to concentrate     If you or a loved one observes any of these behaviors or has concerns about self-harm, here's what you can do:  · Talk about it- your feelings and reasons for harming yourself  · Remove any means that you might use to hurt yourself (examples: pills, rope, extension cords, firearm)  · Get  professional help from the community (Mental Health, Substance Abuse, psychological counseling)  · Do not be alone:Call your Safe Contact- someone whom you trust who will be there for you.  · Call your local CRISIS HOTLINE 818-2175 or 676-704-9784  · Call your local Children's Mobile Crisis Response Team [place] (858) 683-2501 or www.P[employer]Predictive  · Call the toll free National Suicide Prevention Hotlines   · National Suicide Prevention Lifeline 952-286-KYYH (4546)  · National Hope Line Network 800-SUICIDE (653-0555)

## 2018-01-19 ENCOUNTER — PATIENT OUTREACH (OUTPATIENT)
Dept: HEALTH INFORMATION MANAGEMENT | Facility: OTHER | Age: 78
End: 2018-01-19

## 2018-01-19 LAB
BACTERIA BLD CULT: NORMAL
BACTERIA BLD CULT: NORMAL
SIGNIFICANT IND 70042: NORMAL
SIGNIFICANT IND 70042: NORMAL
SITE SITE: NORMAL
SITE SITE: NORMAL
SOURCE SOURCE: NORMAL
SOURCE SOURCE: NORMAL

## 2018-02-16 ENCOUNTER — PATIENT OUTREACH (OUTPATIENT)
Dept: HEALTH INFORMATION MANAGEMENT | Facility: OTHER | Age: 78
End: 2018-02-16

## 2018-02-28 NOTE — PROGRESS NOTES
Malena Milton was an emergent admission on 1/14/18 for cough, headache, and sore throat. She discharged on 1/18 with orders to follow up with her PCP and for her to receive home health. Patient Advocate was able to follow up with the patient on several occasions and confirmed that she is following up with providers and taking her medications as prescribed. PPS 60%.

## 2018-08-26 ENCOUNTER — HOSPITAL ENCOUNTER (INPATIENT)
Facility: MEDICAL CENTER | Age: 78
LOS: 3 days | DRG: 872 | End: 2018-08-29
Attending: EMERGENCY MEDICINE | Admitting: INTERNAL MEDICINE
Payer: MEDICARE

## 2018-08-26 ENCOUNTER — APPOINTMENT (OUTPATIENT)
Dept: RADIOLOGY | Facility: MEDICAL CENTER | Age: 78
DRG: 872 | End: 2018-08-26
Attending: EMERGENCY MEDICINE
Payer: MEDICARE

## 2018-08-26 DIAGNOSIS — N30.00 ACUTE CYSTITIS WITHOUT HEMATURIA: ICD-10-CM

## 2018-08-26 DIAGNOSIS — R53.1 WEAKNESS: ICD-10-CM

## 2018-08-26 DIAGNOSIS — R53.1 LEFT-SIDED WEAKNESS: ICD-10-CM

## 2018-08-26 PROBLEM — Z98.890 H/O BRAIN SURGERY: Status: ACTIVE | Noted: 2018-08-26

## 2018-08-26 PROBLEM — A41.9 SEPSIS (HCC): Status: ACTIVE | Noted: 2018-08-26

## 2018-08-26 LAB
ALBUMIN SERPL BCP-MCNC: 3.6 G/DL (ref 3.2–4.9)
ALBUMIN/GLOB SERPL: 0.8 G/DL
ALP SERPL-CCNC: 75 U/L (ref 30–99)
ALT SERPL-CCNC: 26 U/L (ref 2–50)
ANION GAP SERPL CALC-SCNC: 12 MMOL/L (ref 0–11.9)
ANISOCYTOSIS BLD QL SMEAR: ABNORMAL
APPEARANCE UR: ABNORMAL
AST SERPL-CCNC: 37 U/L (ref 12–45)
BACTERIA #/AREA URNS HPF: ABNORMAL /HPF
BASOPHILS # BLD AUTO: 0 % (ref 0–1.8)
BASOPHILS # BLD: 0 K/UL (ref 0–0.12)
BILIRUB SERPL-MCNC: 1 MG/DL (ref 0.1–1.5)
BILIRUB UR QL STRIP.AUTO: NEGATIVE
BNP SERPL-MCNC: 33 PG/ML (ref 0–100)
BUN SERPL-MCNC: 10 MG/DL (ref 8–22)
CALCIUM SERPL-MCNC: 9.1 MG/DL (ref 8.5–10.5)
CHLORIDE SERPL-SCNC: 102 MMOL/L (ref 96–112)
CO2 SERPL-SCNC: 23 MMOL/L (ref 20–33)
COLOR UR: ABNORMAL
CREAT SERPL-MCNC: 0.6 MG/DL (ref 0.5–1.4)
EKG IMPRESSION: NORMAL
EOSINOPHIL # BLD AUTO: 0.1 K/UL (ref 0–0.51)
EOSINOPHIL NFR BLD: 0.9 % (ref 0–6.9)
EPI CELLS #/AREA URNS HPF: ABNORMAL /HPF
ERYTHROCYTE [DISTWIDTH] IN BLOOD BY AUTOMATED COUNT: 46.6 FL (ref 35.9–50)
GIANT PLATELETS BLD QL SMEAR: NORMAL
GLOBULIN SER CALC-MCNC: 4.4 G/DL (ref 1.9–3.5)
GLUCOSE SERPL-MCNC: 120 MG/DL (ref 65–99)
GLUCOSE UR STRIP.AUTO-MCNC: NEGATIVE MG/DL
HCT VFR BLD AUTO: 37.1 % (ref 37–47)
HGB BLD-MCNC: 12.8 G/DL (ref 12–16)
KETONES UR STRIP.AUTO-MCNC: NEGATIVE MG/DL
LACTATE BLD-SCNC: 1.1 MMOL/L (ref 0.5–2)
LACTATE BLD-SCNC: 1.4 MMOL/L (ref 0.5–2)
LEUKOCYTE ESTERASE UR QL STRIP.AUTO: ABNORMAL
LYMPHOCYTES # BLD AUTO: 0.49 K/UL (ref 1–4.8)
LYMPHOCYTES NFR BLD: 4.4 % (ref 22–41)
MACROCYTES BLD QL SMEAR: ABNORMAL
MANUAL DIFF BLD: NORMAL
MCH RBC QN AUTO: 33.3 PG (ref 27–33)
MCHC RBC AUTO-ENTMCNC: 34.5 G/DL (ref 33.6–35)
MCV RBC AUTO: 96.6 FL (ref 81.4–97.8)
MICRO URNS: ABNORMAL
MONOCYTES # BLD AUTO: 1.78 K/UL (ref 0–0.85)
MONOCYTES NFR BLD AUTO: 15.9 % (ref 0–13.4)
MORPHOLOGY BLD-IMP: NORMAL
MYELOCYTES NFR BLD MANUAL: 0.9 %
NEUTROPHILS # BLD AUTO: 8.72 K/UL (ref 2–7.15)
NEUTROPHILS NFR BLD: 76.1 % (ref 44–72)
NEUTS BAND NFR BLD MANUAL: 1.8 % (ref 0–10)
NITRITE UR QL STRIP.AUTO: POSITIVE
NRBC # BLD AUTO: 0 K/UL
NRBC BLD-RTO: 0 /100 WBC
OVALOCYTES BLD QL SMEAR: NORMAL
PH UR STRIP.AUTO: 6.5 [PH]
PLATELET # BLD AUTO: 137 K/UL (ref 164–446)
PLATELET BLD QL SMEAR: NORMAL
PMV BLD AUTO: 10.9 FL (ref 9–12.9)
POTASSIUM SERPL-SCNC: 3.9 MMOL/L (ref 3.6–5.5)
PROT SERPL-MCNC: 8 G/DL (ref 6–8.2)
PROT UR QL STRIP: 300 MG/DL
RBC # BLD AUTO: 3.84 M/UL (ref 4.2–5.4)
RBC # URNS HPF: ABNORMAL /HPF
RBC BLD AUTO: PRESENT
RBC UR QL AUTO: ABNORMAL
SODIUM SERPL-SCNC: 137 MMOL/L (ref 135–145)
SP GR UR STRIP.AUTO: 1.02
TROPONIN I SERPL-MCNC: <0.01 NG/ML (ref 0–0.04)
UROBILINOGEN UR STRIP.AUTO-MCNC: 1 MG/DL
WBC # BLD AUTO: 11.2 K/UL (ref 4.8–10.8)
WBC #/AREA URNS HPF: ABNORMAL /HPF

## 2018-08-26 PROCEDURE — 99223 1ST HOSP IP/OBS HIGH 75: CPT | Mod: GC | Performed by: INTERNAL MEDICINE

## 2018-08-26 PROCEDURE — 71045 X-RAY EXAM CHEST 1 VIEW: CPT

## 2018-08-26 PROCEDURE — A9270 NON-COVERED ITEM OR SERVICE: HCPCS | Mod: JG | Performed by: STUDENT IN AN ORGANIZED HEALTH CARE EDUCATION/TRAINING PROGRAM

## 2018-08-26 PROCEDURE — 87186 SC STD MICRODIL/AGAR DIL: CPT

## 2018-08-26 PROCEDURE — 700105 HCHG RX REV CODE 258: Performed by: EMERGENCY MEDICINE

## 2018-08-26 PROCEDURE — 700102 HCHG RX REV CODE 250 W/ 637 OVERRIDE(OP): Mod: JG | Performed by: STUDENT IN AN ORGANIZED HEALTH CARE EDUCATION/TRAINING PROGRAM

## 2018-08-26 PROCEDURE — 87086 URINE CULTURE/COLONY COUNT: CPT

## 2018-08-26 PROCEDURE — 36415 COLL VENOUS BLD VENIPUNCTURE: CPT

## 2018-08-26 PROCEDURE — A9270 NON-COVERED ITEM OR SERVICE: HCPCS | Performed by: EMERGENCY MEDICINE

## 2018-08-26 PROCEDURE — 81001 URINALYSIS AUTO W/SCOPE: CPT

## 2018-08-26 PROCEDURE — 700102 HCHG RX REV CODE 250 W/ 637 OVERRIDE(OP): Performed by: STUDENT IN AN ORGANIZED HEALTH CARE EDUCATION/TRAINING PROGRAM

## 2018-08-26 PROCEDURE — 700111 HCHG RX REV CODE 636 W/ 250 OVERRIDE (IP): Performed by: INTERNAL MEDICINE

## 2018-08-26 PROCEDURE — 96361 HYDRATE IV INFUSION ADD-ON: CPT

## 2018-08-26 PROCEDURE — 700102 HCHG RX REV CODE 250 W/ 637 OVERRIDE(OP): Performed by: EMERGENCY MEDICINE

## 2018-08-26 PROCEDURE — 85027 COMPLETE CBC AUTOMATED: CPT

## 2018-08-26 PROCEDURE — 84484 ASSAY OF TROPONIN QUANT: CPT

## 2018-08-26 PROCEDURE — 85007 BL SMEAR W/DIFF WBC COUNT: CPT

## 2018-08-26 PROCEDURE — 80053 COMPREHEN METABOLIC PANEL: CPT

## 2018-08-26 PROCEDURE — 96365 THER/PROPH/DIAG IV INF INIT: CPT

## 2018-08-26 PROCEDURE — 83605 ASSAY OF LACTIC ACID: CPT

## 2018-08-26 PROCEDURE — 99285 EMERGENCY DEPT VISIT HI MDM: CPT

## 2018-08-26 PROCEDURE — 93005 ELECTROCARDIOGRAM TRACING: CPT | Performed by: EMERGENCY MEDICINE

## 2018-08-26 PROCEDURE — 83880 ASSAY OF NATRIURETIC PEPTIDE: CPT

## 2018-08-26 PROCEDURE — 700105 HCHG RX REV CODE 258: Performed by: STUDENT IN AN ORGANIZED HEALTH CARE EDUCATION/TRAINING PROGRAM

## 2018-08-26 PROCEDURE — 770006 HCHG ROOM/CARE - MED/SURG/GYN SEMI*

## 2018-08-26 PROCEDURE — 700111 HCHG RX REV CODE 636 W/ 250 OVERRIDE (IP): Performed by: EMERGENCY MEDICINE

## 2018-08-26 PROCEDURE — 87040 BLOOD CULTURE FOR BACTERIA: CPT | Mod: 91

## 2018-08-26 PROCEDURE — 87077 CULTURE AEROBIC IDENTIFY: CPT

## 2018-08-26 PROCEDURE — A9270 NON-COVERED ITEM OR SERVICE: HCPCS | Performed by: STUDENT IN AN ORGANIZED HEALTH CARE EDUCATION/TRAINING PROGRAM

## 2018-08-26 RX ORDER — BACLOFEN 10 MG/1
10 TABLET ORAL 3 TIMES DAILY
COMMUNITY

## 2018-08-26 RX ORDER — LANOLIN ALCOHOL/MO/W.PET/CERES
3 CREAM (GRAM) TOPICAL
Status: DISCONTINUED | OUTPATIENT
Start: 2018-08-26 | End: 2018-08-26

## 2018-08-26 RX ORDER — PRAVASTATIN SODIUM 20 MG
40 TABLET ORAL NIGHTLY
Status: DISCONTINUED | OUTPATIENT
Start: 2018-08-26 | End: 2018-08-29 | Stop reason: HOSPADM

## 2018-08-26 RX ORDER — ACETAMINOPHEN 325 MG/1
650 TABLET ORAL ONCE
Status: COMPLETED | OUTPATIENT
Start: 2018-08-26 | End: 2018-08-26

## 2018-08-26 RX ORDER — BACLOFEN 10 MG/1
10 TABLET ORAL 3 TIMES DAILY
Status: DISCONTINUED | OUTPATIENT
Start: 2018-08-26 | End: 2018-08-29 | Stop reason: HOSPADM

## 2018-08-26 RX ORDER — GABAPENTIN 300 MG/1
300 CAPSULE ORAL
Status: DISCONTINUED | OUTPATIENT
Start: 2018-08-26 | End: 2018-08-29 | Stop reason: HOSPADM

## 2018-08-26 RX ORDER — NYSTATIN 100000 [USP'U]/G
1 POWDER TOPICAL 2 TIMES DAILY
COMMUNITY

## 2018-08-26 RX ORDER — DIPHENHYDRAMINE HCL 25 MG
25 TABLET ORAL NIGHTLY PRN
Status: DISCONTINUED | OUTPATIENT
Start: 2018-08-26 | End: 2018-08-28

## 2018-08-26 RX ORDER — ATENOLOL 50 MG/1
50 TABLET ORAL DAILY
Status: DISCONTINUED | OUTPATIENT
Start: 2018-08-27 | End: 2018-08-29 | Stop reason: HOSPADM

## 2018-08-26 RX ORDER — ERGOCALCIFEROL 1.25 MG/1
50000 CAPSULE ORAL
COMMUNITY

## 2018-08-26 RX ORDER — CLOPIDOGREL BISULFATE 75 MG/1
75 TABLET ORAL DAILY
Status: DISCONTINUED | OUTPATIENT
Start: 2018-08-27 | End: 2018-08-29 | Stop reason: HOSPADM

## 2018-08-26 RX ORDER — POLYETHYLENE GLYCOL 3350 17 G/17G
1 POWDER, FOR SOLUTION ORAL
Status: DISCONTINUED | OUTPATIENT
Start: 2018-08-26 | End: 2018-08-28

## 2018-08-26 RX ORDER — BISACODYL 10 MG
10 SUPPOSITORY, RECTAL RECTAL
Status: DISCONTINUED | OUTPATIENT
Start: 2018-08-26 | End: 2018-08-28

## 2018-08-26 RX ORDER — AMOXICILLIN 250 MG
2 CAPSULE ORAL 2 TIMES DAILY
Status: DISCONTINUED | OUTPATIENT
Start: 2018-08-27 | End: 2018-08-28

## 2018-08-26 RX ORDER — LABETALOL HYDROCHLORIDE 5 MG/ML
10 INJECTION, SOLUTION INTRAVENOUS EVERY 6 HOURS PRN
Status: DISCONTINUED | OUTPATIENT
Start: 2018-08-26 | End: 2018-08-28

## 2018-08-26 RX ORDER — ALENDRONATE SODIUM 70 MG/1
70 TABLET ORAL
COMMUNITY

## 2018-08-26 RX ORDER — ACETAMINOPHEN 325 MG/1
1000 TABLET ORAL 3 TIMES DAILY PRN
Status: DISCONTINUED | OUTPATIENT
Start: 2018-08-26 | End: 2018-08-29 | Stop reason: HOSPADM

## 2018-08-26 RX ORDER — SODIUM CHLORIDE 9 MG/ML
1000 INJECTION, SOLUTION INTRAVENOUS ONCE
Status: COMPLETED | OUTPATIENT
Start: 2018-08-26 | End: 2018-08-26

## 2018-08-26 RX ORDER — LEVETIRACETAM 500 MG/1
500 TABLET ORAL 2 TIMES DAILY
Status: DISCONTINUED | OUTPATIENT
Start: 2018-08-26 | End: 2018-08-29 | Stop reason: HOSPADM

## 2018-08-26 RX ORDER — GABAPENTIN 300 MG/1
300 CAPSULE ORAL
COMMUNITY

## 2018-08-26 RX ORDER — AMOXICILLIN 250 MG
1 CAPSULE ORAL DAILY
COMMUNITY

## 2018-08-26 RX ORDER — SODIUM CHLORIDE 9 MG/ML
INJECTION, SOLUTION INTRAVENOUS CONTINUOUS
Status: DISCONTINUED | OUTPATIENT
Start: 2018-08-26 | End: 2018-08-29 | Stop reason: HOSPADM

## 2018-08-26 RX ADMIN — SODIUM CHLORIDE: 9 INJECTION, SOLUTION INTRAVENOUS at 17:45

## 2018-08-26 RX ADMIN — GABAPENTIN 300 MG: 300 CAPSULE ORAL at 21:16

## 2018-08-26 RX ADMIN — BACLOFEN 10 MG: 10 TABLET ORAL at 21:16

## 2018-08-26 RX ADMIN — CEFTRIAXONE SODIUM 1 G: 1 INJECTION, POWDER, FOR SOLUTION INTRAMUSCULAR; INTRAVENOUS at 14:58

## 2018-08-26 RX ADMIN — SODIUM CHLORIDE 1000 ML: 9 INJECTION, SOLUTION INTRAVENOUS at 14:59

## 2018-08-26 RX ADMIN — LEVETIRACETAM 500 MG: 500 TABLET ORAL at 21:16

## 2018-08-26 RX ADMIN — ENOXAPARIN SODIUM 30 MG: 100 INJECTION SUBCUTANEOUS at 21:16

## 2018-08-26 RX ADMIN — SODIUM CHLORIDE 1000 ML: 9 INJECTION, SOLUTION INTRAVENOUS at 17:53

## 2018-08-26 RX ADMIN — ACETAMINOPHEN 650 MG: 325 TABLET, FILM COATED ORAL at 14:58

## 2018-08-26 RX ADMIN — PRAVASTATIN SODIUM 40 MG: 20 TABLET ORAL at 21:16

## 2018-08-26 RX ADMIN — SODIUM CHLORIDE: 9 INJECTION, SOLUTION INTRAVENOUS at 21:20

## 2018-08-26 RX ADMIN — DIPHENHYDRAMINE HCL 25 MG: 25 TABLET ORAL at 22:56

## 2018-08-26 ASSESSMENT — ENCOUNTER SYMPTOMS
NERVOUS/ANXIOUS: 0
DIARRHEA: 0
NAUSEA: 1
HEADACHES: 0
SORE THROAT: 0
ABDOMINAL PAIN: 0
DIZZINESS: 0
COUGH: 0
FEVER: 0
INSOMNIA: 0
CONSTIPATION: 1
VOMITING: 0
NECK PAIN: 0
WEAKNESS: 1
DIAPHORESIS: 0
CHILLS: 0
SHORTNESS OF BREATH: 0
FLANK PAIN: 0
ORTHOPNEA: 0

## 2018-08-26 ASSESSMENT — COGNITIVE AND FUNCTIONAL STATUS - GENERAL
DAILY ACTIVITIY SCORE: 16
MOVING FROM LYING ON BACK TO SITTING ON SIDE OF FLAT BED: A LOT
CLIMB 3 TO 5 STEPS WITH RAILING: TOTAL
DRESSING REGULAR LOWER BODY CLOTHING: A LOT
MOVING TO AND FROM BED TO CHAIR: A LOT
TURNING FROM BACK TO SIDE WHILE IN FLAT BAD: A LOT
STANDING UP FROM CHAIR USING ARMS: A LOT
SUGGESTED CMS G CODE MODIFIER MOBILITY: CL
PERSONAL GROOMING: A LITTLE
MOBILITY SCORE: 10
TOILETING: A LOT
DRESSING REGULAR UPPER BODY CLOTHING: A LITTLE
EATING MEALS: A LITTLE
SUGGESTED CMS G CODE MODIFIER DAILY ACTIVITY: CK
WALKING IN HOSPITAL ROOM: TOTAL
HELP NEEDED FOR BATHING: A LITTLE

## 2018-08-26 ASSESSMENT — PAIN SCALES - GENERAL: PAINLEVEL_OUTOF10: 0

## 2018-08-26 ASSESSMENT — COPD QUESTIONNAIRES
HAVE YOU SMOKED AT LEAST 100 CIGARETTES IN YOUR ENTIRE LIFE: NO/DON'T KNOW
DURING THE PAST 4 WEEKS HOW MUCH DID YOU FEEL SHORT OF BREATH: NONE/LITTLE OF THE TIME
DO YOU EVER COUGH UP ANY MUCUS OR PHLEGM?: NO/ONLY WITH OCCASIONAL COLDS OR INFECTIONS
COPD SCREENING SCORE: 2
IN THE PAST 12 MONTHS DO YOU DO LESS THAN YOU USED TO BECAUSE OF YOUR BREATHING PROBLEMS: DISAGREE/UNSURE

## 2018-08-26 ASSESSMENT — PATIENT HEALTH QUESTIONNAIRE - PHQ9
2. FEELING DOWN, DEPRESSED, IRRITABLE, OR HOPELESS: NOT AT ALL
2. FEELING DOWN, DEPRESSED, IRRITABLE, OR HOPELESS: NOT AT ALL
1. LITTLE INTEREST OR PLEASURE IN DOING THINGS: NOT AT ALL
SUM OF ALL RESPONSES TO PHQ9 QUESTIONS 1 AND 2: 0
1. LITTLE INTEREST OR PLEASURE IN DOING THINGS: NOT AT ALL
SUM OF ALL RESPONSES TO PHQ9 QUESTIONS 1 AND 2: 0

## 2018-08-26 ASSESSMENT — LIFESTYLE VARIABLES
EVER_SMOKED: NEVER
ALCOHOL_USE: NO

## 2018-08-26 NOTE — ED TRIAGE NOTES
SHANI. Pt lives at group home, per staff pt felt fine yesterday and woke up this morning feeling weaker than normal. Pt is at baseline for confusion due to dementia and left sided weakness due to stroke in 2013

## 2018-08-26 NOTE — ED NOTES
Family at bedside. Pt connected to monitor, IV established and blood sent to lab. Chart up for ERP eval

## 2018-08-26 NOTE — ED NOTES
Med rec updated and complete  Allergies reviewed  Pt is not sure of her medications.  Received MAR from [facility]

## 2018-08-26 NOTE — ED PROVIDER NOTES
ED Provider Note    Scribed for Steve Ang M.D. by Jeri Madrigal. 8/26/2018, 12:56 PM.    Primary care provider: JOSE ELIAS Becerra  Means of arrival: Ambulance  History obtained from: Family Member  History limited by: None     CHIEF COMPLAINT  Chief Complaint   Patient presents with   • Weakness     weakness since this morning per staff     HPI  Malena Milton is a 78 y.o. Female with a history of a stroke who presents to the Emergency Department with worsening generalized weakness earlier today while in her group home. Family member states she is ambulatory with assistance and slightly confused at baseline secondary to dementia. She denies any leg pain or abdominal pain. Patient has left sided weakness at baseline secondary to her history of stroke.     REVIEW OF SYSTEMS  Review of Systems   Gastrointestinal: Negative for abdominal pain.   Musculoskeletal: Negative for joint pain.   Neurological: Positive for weakness.   Psychiatric/Behavioral:        Positive for confusion   All other systems reviewed and are negative.  C.     PAST MEDICAL HISTORY   has a past medical history of CVA (cerebral vascular accident) ([facility]); Hypertension; and Stroke ([facility]) (06/2013).    SURGICAL HISTORY   has a past surgical history that includes craniotomy brain lab (2013).    SOCIAL HISTORY  Social History   Substance Use Topics   • Smoking status: Former Smoker   • Smokeless tobacco: Former User   • Alcohol use No      History   Drug Use No     FAMILY HISTORY  Family History   Problem Relation Age of Onset   • Breast Cancer Sister        CURRENT MEDICATIONS    Current Facility-Administered Medications:   •  [START ON 8/27/2018] senna-docusate **AND** polyethylene glycol/lytes **AND** magnesium hydroxide **AND** bisacodyl  •  enoxaparin  •  labetalol  •  acetaminophen  •  [START ON 8/27/2018] atenolol  •  baclofen  •  [START ON 8/27/2018] clopidogrel  •  gabapentin  •  levETIRAcetam  •  pravastatin  •  [START ON 8/27/2018]  cefTRIAXone (ROCEPHIN) IV  •  NS    Current Outpatient Prescriptions:   •  baclofen, 10 mg, Oral, TID, 8/25/2018 at 0800  •  senna-docusate, 1 Tab, Oral, DAILY, 8/26/2018 at 0800  •  gabapentin, 300 mg, Oral, QHS, 8/25/2018 at 2000  •  vitamin D (Ergocalciferol), 50,000 Units, Oral, Q7 DAYS, 8/22/2018 at 0800  •  alendronate, 70 mg, Oral, Q7 DAYS, 8/22/2018 at 0800  •  nystatin, 1 g, Topical, BID, 8/26/2018 at 0800  •  pravastatin, 40 mg, Oral, Nightly, 8/25/2018 at 2000  •  melatonin, 3 mg, Oral, QHS, 8/25/2018 at 2000  •  atenolol, 50 mg, Oral, DAILY, 8/26/2018 at 0800  •  clopidogrel, 75 mg, Oral, DAILY, 8/26/2018 at 0800  •  levETIRAcetam, 500 mg, Oral, BID, 8/26/2018 at 0800     ALLERGIES  No Known Allergies    PHYSICAL EXAM  VITAL SIGNS: /72   Pulse (!) 104   Temp (!) 38.2 °C (100.8 °F)   Resp 18   Ht 1.524 m (5')   Wt 44.9 kg (99 lb)   BMI 19.33 kg/m²     Constitutional: Well developed, Well nourished, No acute distress, Non-toxic appearance.   HENT: Normocephalic, Atraumatic, Bilateral external ears normal, Dry mcous membranes, No oral exudates, Nose normal.   Eyes:conjunctiva is normal, there are no signs of exudate.   Neck: Supple, no meningeal signs.  Lymphatic: No lymphadenopathy noted.   Cardiovascular: Regular rate and rhythm without murmurs gallops or rubs.   Thorax & Lungs: Diminished but normal, No respiratory distress. Breathing comfortably. Lungs are clear to auscultation bilaterally, there are no wheezes no rales. Chest wall is nontender.  Abdomen: Soft, nontender, nondistended. Bowel sounds are present.   Skin: Warm, Dry, No erythema,   Back: No tenderness, No CVA tenderness.  Musculoskeletal: Good range of motion in all major joints. No tenderness to palpation or major deformities noted. Intact distal pulses, no clubbing, no cyanosis, 1+ pitting edema to left lower extremity.   Neurologic: 2/5 strength to left upper extremity and left lower extremity  At baseline, Altered mental  status at baseline, Moving all extremities. No gross abnormalities.    Psychiatric: Affect normal, Judgment normal, Mood normal.     LABS  Results for orders placed or performed during the hospital encounter of 08/26/18   LACTIC ACID   Result Value Ref Range    Lactic Acid 1.4 0.5 - 2.0 mmol/L   LACTIC ACID   Result Value Ref Range    Lactic Acid 1.1 0.5 - 2.0 mmol/L   CBC WITH DIFFERENTIAL   Result Value Ref Range    WBC 11.2 (H) 4.8 - 10.8 K/uL    RBC 3.84 (L) 4.20 - 5.40 M/uL    Hemoglobin 12.8 12.0 - 16.0 g/dL    Hematocrit 37.1 37.0 - 47.0 %    MCV 96.6 81.4 - 97.8 fL    MCH 33.3 (H) 27.0 - 33.0 pg    MCHC 34.5 33.6 - 35.0 g/dL    RDW 46.6 35.9 - 50.0 fL    Platelet Count 137 (L) 164 - 446 K/uL    MPV 10.9 9.0 - 12.9 fL    Nucleated RBC 0.00 /100 WBC    NRBC (Absolute) 0.00 K/uL    Neutrophils-Polys 76.10 (H) 44.00 - 72.00 %    Lymphocytes 4.40 (L) 22.00 - 41.00 %    Monocytes 15.90 (H) 0.00 - 13.40 %    Eosinophils 0.90 0.00 - 6.90 %    Basophils 0.00 0.00 - 1.80 %    Neutrophils (Absolute) 8.72 (H) 2.00 - 7.15 K/uL    Lymphs (Absolute) 0.49 (L) 1.00 - 4.80 K/uL    Monos (Absolute) 1.78 (H) 0.00 - 0.85 K/uL    Eos (Absolute) 0.10 0.00 - 0.51 K/uL    Baso (Absolute) 0.00 0.00 - 0.12 K/uL    Anisocytosis 1+     Macrocytosis 1+    COMP METABOLIC PANEL   Result Value Ref Range    Sodium 137 135 - 145 mmol/L    Potassium 3.9 3.6 - 5.5 mmol/L    Chloride 102 96 - 112 mmol/L    Co2 23 20 - 33 mmol/L    Anion Gap 12.0 (H) 0.0 - 11.9    Glucose 120 (H) 65 - 99 mg/dL    Bun 10 8 - 22 mg/dL    Creatinine 0.60 0.50 - 1.40 mg/dL    Calcium 9.1 8.5 - 10.5 mg/dL    AST(SGOT) 37 12 - 45 U/L    ALT(SGPT) 26 2 - 50 U/L    Alkaline Phosphatase 75 30 - 99 U/L    Total Bilirubin 1.0 0.1 - 1.5 mg/dL    Albumin 3.6 3.2 - 4.9 g/dL    Total Protein 8.0 6.0 - 8.2 g/dL    Globulin 4.4 (H) 1.9 - 3.5 g/dL    A-G Ratio 0.8 g/dL   URINALYSIS   Result Value Ref Range    Color DK Yellow     Character Turbid (A)     Specific Gravity 1.018  <1.035    Ph 6.5 5.0 - 8.0    Glucose Negative Negative mg/dL    Ketones Negative Negative mg/dL    Protein 300 (A) Negative mg/dL    Bilirubin Negative Negative    Urobilinogen, Urine 1.0 Negative    Nitrite Positive (A) Negative    Leukocyte Esterase Small (A) Negative    Occult Blood Moderate (A) Negative    Micro Urine Req Microscopic    TROPONIN   Result Value Ref Range    Troponin I <0.01 0.00 - 0.04 ng/mL   BNP   Result Value Ref Range    B Natriuretic Peptide 33 0 - 100 pg/mL   URINE MICROSCOPIC (W/UA)   Result Value Ref Range    WBC  (A) /hpf    RBC 5-10 (A) /hpf    Bacteria Many (A) None /hpf    Epithelial Cells Moderate (A) /hpf   DIFFERENTIAL MANUAL   Result Value Ref Range    Bands-Stabs 1.80 0.00 - 10.00 %    Myelocytes 0.90 %    Manual Diff Status PERFORMED    PERIPHERAL SMEAR REVIEW   Result Value Ref Range    Peripheral Smear Review see below    PLATELET ESTIMATE   Result Value Ref Range    Plt Estimation Decreased    MORPHOLOGY   Result Value Ref Range    RBC Morphology Present     Giant Platelets 1+     Ovalocytes 1+    ESTIMATED GFR   Result Value Ref Range    GFR If African American >60 >60 mL/min/1.73 m 2    GFR If Non African American >60 >60 mL/min/1.73 m 2   EKG (ER)   Result Value Ref Range    Report       [facility].    Test Date:  2018  Pt Name:    CLAUS BECERRA                 Department: ER  MRN:        [id number]                      Room:       RD 02  Gender:     Female                       Technician: [id number]  :        [id number]                   Requested By:JOSSE VELEZ  Order #:    [id number]                    Reading MD: JOSSE VELEZ MD    Measurements  Intervals                                Axis  Rate:       103                          P:          57  TX:         172                          QRS:        70  QRSD:       70                           T:          6  QT:         316  QTc:        414    Interpretive  Statements  SINUS TACHYCARDIA  BORDERLINE T ABNORMALITIES, ANTERIOR LEADS  Compared to ECG 01/14/2018 11:34:05  No significant changes    Electronically Signed On 8- 14:08:13 PDT by JOSSE VELEZ MD     All labs reviewed by me.    EKG  Interpreted by me as above.     RADIOLOGY  DX-CHEST-PORTABLE (1 VIEW)   Final Result         1. Low lung volume with hypoventilatory change and bibasilar atelectasis.      The radiologist's interpretation of all radiological studies have been reviewed by me.    COURSE & MEDICAL DECISION MAKING  Pertinent Labs & Imaging studies reviewed. (See chart for details)    12:56 PM Patient seen and examined at bedside. The patient will be resuscitated with 1L NS IV secondary to clinical dehydration. Ordered DX chest, EKG, Lactic acid x 2, CBC, CMP, UA, Urine culture, Blood culture x 2, Troponin, and BNP to evaluate her symptoms. The differential diagnoses include but are not limited to: UTI, Infection, Dehydration.      2:44 PM Patient reevaluated at bedside. Discussed diagnostic results with patient's family member which showed UTI. Family member agreed to hospital admission for further treatment.     2:50 PM Paged R Internal Medicine.     2:57 PM Consulted [facility] who agreed to admit patient.     4:00 PMPatient reevaluated at bedside. She is doing better after treatment with IV fluids.     Decision Making:  Patient presents for evaluation for clinically the patient does have left-sided weakness which apparently baseline for the patient.  She apparently is more weak and slightly more confused per son.  At this point she does have a urinary tract infection I did give the patient a liter bolus of normal saline for tachycardia and signs of dehydration she is slightly improved from that standpoint after the fluid bolus however she does have an infection after the patient and Rocephin will admit the patient for further treatment and care.    DISPOSITION:  Patient will be  admitted to [facility] in guarded condition.      FINAL IMPRESSION  1. Acute cystitis without hematuria    2. Weakness    3. Left-sided weakness         IJeri (Scribe), am scribing for, and in the presence of, Steve Ang M.D..  Electronically signed by: Jeri Madrigal (Scribe), 8/26/2018  ISteve M.D. personally performed the services described in this documentation, as scribed by Jeri Madrigal in my presence, and it is both accurate and complete.    The note accurately reflects work and decisions made by me.  Steve Ang  8/26/2018  6:16 PM

## 2018-08-26 NOTE — ED NOTES
KINDER FALL RISK       RISK  Present to ED b/c of fall (syncope, seizure, or ALOC) Yes  Age  >70 Yes  Altered Mental Status (Intoxicated with alcohol or substance confusion, inability to follow instructions, disorientation) Yes  Impaired Mobility (Ambulates or transfers with assistive devices or assist. Ambulates with unsteady gait and no assistance.  Unable to ambulate to transfer.) Yes  Nursing Judgement (Bowel or bladder incontinence, diarrhea, urinary frequency or urgency, leg weakness, orthostatic hypotension, dizziness or vertigo, narcotic use.) Yes    Interventions in place in red.   YES TO ANY RISK = HIGH FALL RISK     1. Move patient closer to nurses stations  2. Familiarize the patient with environment  3. Place call light within reach and demonstrate call light use  4. Keep patients personal possessions within patient safe reach (if appropriate)   5. Place stretcher in low position and brakes locked  6.Place yellow socks and armband on patient   7. Place green triangle on patients door  8. Give patient urinal if applicable  9. Keep floor surfaces clean and dry  10.Keep patient care areas uncluttered  11. Use a lap belt or posey vest   12. Assess patient hourly for :Pain, persona needs, position change, and call light access.       High fall risk, all appropriate interventions in place

## 2018-08-27 ENCOUNTER — APPOINTMENT (OUTPATIENT)
Dept: RADIOLOGY | Facility: MEDICAL CENTER | Age: 78
DRG: 872 | End: 2018-08-27
Attending: STUDENT IN AN ORGANIZED HEALTH CARE EDUCATION/TRAINING PROGRAM
Payer: MEDICARE

## 2018-08-27 LAB
ANION GAP SERPL CALC-SCNC: 10 MMOL/L (ref 0–11.9)
BASOPHILS # BLD AUTO: 0.1 % (ref 0–1.8)
BASOPHILS # BLD: 0.01 K/UL (ref 0–0.12)
BUN SERPL-MCNC: 6 MG/DL (ref 8–22)
CALCIUM SERPL-MCNC: 8.7 MG/DL (ref 8.5–10.5)
CHLORIDE SERPL-SCNC: 111 MMOL/L (ref 96–112)
CO2 SERPL-SCNC: 24 MMOL/L (ref 20–33)
CREAT SERPL-MCNC: 0.54 MG/DL (ref 0.5–1.4)
EOSINOPHIL # BLD AUTO: 0.02 K/UL (ref 0–0.51)
EOSINOPHIL NFR BLD: 0.2 % (ref 0–6.9)
ERYTHROCYTE [DISTWIDTH] IN BLOOD BY AUTOMATED COUNT: 50.3 FL (ref 35.9–50)
GLUCOSE SERPL-MCNC: 107 MG/DL (ref 65–99)
HCT VFR BLD AUTO: 39.7 % (ref 37–47)
HGB BLD-MCNC: 12.5 G/DL (ref 12–16)
IMM GRANULOCYTES # BLD AUTO: 0.19 K/UL (ref 0–0.11)
IMM GRANULOCYTES NFR BLD AUTO: 1.9 % (ref 0–0.9)
LYMPHOCYTES # BLD AUTO: 1.08 K/UL (ref 1–4.8)
LYMPHOCYTES NFR BLD: 11 % (ref 22–41)
MAGNESIUM SERPL-MCNC: 1.9 MG/DL (ref 1.5–2.5)
MCH RBC QN AUTO: 32.9 PG (ref 27–33)
MCHC RBC AUTO-ENTMCNC: 31.5 G/DL (ref 33.6–35)
MCV RBC AUTO: 104.5 FL (ref 81.4–97.8)
MONOCYTES # BLD AUTO: 2.07 K/UL (ref 0–0.85)
MONOCYTES NFR BLD AUTO: 21.1 % (ref 0–13.4)
NEUTROPHILS # BLD AUTO: 6.44 K/UL (ref 2–7.15)
NEUTROPHILS NFR BLD: 65.7 % (ref 44–72)
NRBC # BLD AUTO: 0 K/UL
NRBC BLD-RTO: 0 /100 WBC
PLATELET # BLD AUTO: 122 K/UL (ref 164–446)
PMV BLD AUTO: 11.1 FL (ref 9–12.9)
POTASSIUM SERPL-SCNC: 3.7 MMOL/L (ref 3.6–5.5)
RBC # BLD AUTO: 3.8 M/UL (ref 4.2–5.4)
SODIUM SERPL-SCNC: 145 MMOL/L (ref 135–145)
WBC # BLD AUTO: 9.8 K/UL (ref 4.8–10.8)

## 2018-08-27 PROCEDURE — 80048 BASIC METABOLIC PNL TOTAL CA: CPT

## 2018-08-27 PROCEDURE — 83735 ASSAY OF MAGNESIUM: CPT

## 2018-08-27 PROCEDURE — G8996 SWALLOW CURRENT STATUS: HCPCS | Mod: CH

## 2018-08-27 PROCEDURE — 700102 HCHG RX REV CODE 250 W/ 637 OVERRIDE(OP): Performed by: INTERNAL MEDICINE

## 2018-08-27 PROCEDURE — 700111 HCHG RX REV CODE 636 W/ 250 OVERRIDE (IP): Performed by: STUDENT IN AN ORGANIZED HEALTH CARE EDUCATION/TRAINING PROGRAM

## 2018-08-27 PROCEDURE — 99232 SBSQ HOSP IP/OBS MODERATE 35: CPT | Mod: GC | Performed by: INTERNAL MEDICINE

## 2018-08-27 PROCEDURE — 85025 COMPLETE CBC W/AUTO DIFF WBC: CPT

## 2018-08-27 PROCEDURE — 700102 HCHG RX REV CODE 250 W/ 637 OVERRIDE(OP): Performed by: STUDENT IN AN ORGANIZED HEALTH CARE EDUCATION/TRAINING PROGRAM

## 2018-08-27 PROCEDURE — 770006 HCHG ROOM/CARE - MED/SURG/GYN SEMI*

## 2018-08-27 PROCEDURE — 92610 EVALUATE SWALLOWING FUNCTION: CPT

## 2018-08-27 PROCEDURE — A9270 NON-COVERED ITEM OR SERVICE: HCPCS | Performed by: STUDENT IN AN ORGANIZED HEALTH CARE EDUCATION/TRAINING PROGRAM

## 2018-08-27 PROCEDURE — 700105 HCHG RX REV CODE 258: Performed by: STUDENT IN AN ORGANIZED HEALTH CARE EDUCATION/TRAINING PROGRAM

## 2018-08-27 PROCEDURE — A9270 NON-COVERED ITEM OR SERVICE: HCPCS | Performed by: INTERNAL MEDICINE

## 2018-08-27 PROCEDURE — 36415 COLL VENOUS BLD VENIPUNCTURE: CPT

## 2018-08-27 PROCEDURE — G8998 SWALLOW D/C STATUS: HCPCS | Mod: CH

## 2018-08-27 PROCEDURE — G8997 SWALLOW GOAL STATUS: HCPCS | Mod: CH

## 2018-08-27 PROCEDURE — 700111 HCHG RX REV CODE 636 W/ 250 OVERRIDE (IP): Performed by: INTERNAL MEDICINE

## 2018-08-27 RX ORDER — ALBUTEROL SULFATE 90 UG/1
2 AEROSOL, METERED RESPIRATORY (INHALATION) EVERY 4 HOURS
Status: DISCONTINUED | OUTPATIENT
Start: 2018-08-27 | End: 2018-08-28

## 2018-08-27 RX ORDER — TRAZODONE HYDROCHLORIDE 50 MG/1
50 TABLET ORAL
Status: DISCONTINUED | OUTPATIENT
Start: 2018-08-27 | End: 2018-08-29 | Stop reason: HOSPADM

## 2018-08-27 RX ADMIN — GABAPENTIN 300 MG: 300 CAPSULE ORAL at 21:09

## 2018-08-27 RX ADMIN — DOCUSATE SODIUM -SENNOSIDES 2 TABLET: 50; 8.6 TABLET, COATED ORAL at 17:54

## 2018-08-27 RX ADMIN — LEVETIRACETAM 500 MG: 500 TABLET ORAL at 06:05

## 2018-08-27 RX ADMIN — CEFTRIAXONE SODIUM 1 G: 1 INJECTION, POWDER, FOR SOLUTION INTRAMUSCULAR; INTRAVENOUS at 05:56

## 2018-08-27 RX ADMIN — TRAZODONE HYDROCHLORIDE 50 MG: 50 TABLET ORAL at 21:09

## 2018-08-27 RX ADMIN — LEVETIRACETAM 500 MG: 500 TABLET ORAL at 17:57

## 2018-08-27 RX ADMIN — BACLOFEN 10 MG: 10 TABLET ORAL at 05:55

## 2018-08-27 RX ADMIN — CLOPIDOGREL 75 MG: 75 TABLET, FILM COATED ORAL at 05:55

## 2018-08-27 RX ADMIN — BACLOFEN 10 MG: 10 TABLET ORAL at 14:15

## 2018-08-27 RX ADMIN — ALBUTEROL SULFATE 2 PUFF: 90 AEROSOL, METERED RESPIRATORY (INHALATION) at 17:54

## 2018-08-27 RX ADMIN — ALBUTEROL SULFATE 2 PUFF: 90 AEROSOL, METERED RESPIRATORY (INHALATION) at 21:09

## 2018-08-27 RX ADMIN — SODIUM CHLORIDE: 9 INJECTION, SOLUTION INTRAVENOUS at 17:54

## 2018-08-27 RX ADMIN — DOCUSATE SODIUM -SENNOSIDES 2 TABLET: 50; 8.6 TABLET, COATED ORAL at 05:55

## 2018-08-27 RX ADMIN — ENOXAPARIN SODIUM 30 MG: 100 INJECTION SUBCUTANEOUS at 05:54

## 2018-08-27 RX ADMIN — ATENOLOL 50 MG: 50 TABLET ORAL at 06:10

## 2018-08-27 RX ADMIN — ALBUTEROL SULFATE 2 PUFF: 90 AEROSOL, METERED RESPIRATORY (INHALATION) at 11:24

## 2018-08-27 RX ADMIN — ALBUTEROL SULFATE 2 PUFF: 90 AEROSOL, METERED RESPIRATORY (INHALATION) at 14:15

## 2018-08-27 RX ADMIN — PRAVASTATIN SODIUM 40 MG: 20 TABLET ORAL at 21:08

## 2018-08-27 ASSESSMENT — ENCOUNTER SYMPTOMS
NERVOUS/ANXIOUS: 0
ORTHOPNEA: 0
NAUSEA: 0
SORE THROAT: 0
DIARRHEA: 0
DIZZINESS: 0
BACK PAIN: 0
VOMITING: 0
ABDOMINAL PAIN: 0
HEADACHES: 0
MEMORY LOSS: 1
COUGH: 0
FEVER: 0
FLANK PAIN: 0
SHORTNESS OF BREATH: 0
CHILLS: 0

## 2018-08-27 ASSESSMENT — PAIN SCALES - GENERAL
PAINLEVEL_OUTOF10: 0

## 2018-08-27 ASSESSMENT — PATIENT HEALTH QUESTIONNAIRE - PHQ9
1. LITTLE INTEREST OR PLEASURE IN DOING THINGS: NOT AT ALL
2. FEELING DOWN, DEPRESSED, IRRITABLE, OR HOPELESS: NOT AT ALL
SUM OF ALL RESPONSES TO PHQ9 QUESTIONS 1 AND 2: 0

## 2018-08-27 NOTE — CARE PLAN
Problem: Communication  Goal: The ability to communicate needs accurately and effectively will improve  Outcome: PROGRESSING AS EXPECTED      Problem: Venous Thromboembolism (VTW)/Deep Vein Thrombosis (DVT) Prevention:  Goal: Patient will participate in Venous Thrombosis (VTE)/Deep Vein Thrombosis (DVT)Prevention Measures  Outcome: PROGRESSING AS EXPECTED      Problem: Bowel/Gastric:  Goal: Normal bowel function is maintained or improved  Outcome: PROGRESSING AS EXPECTED      Problem: Knowledge Deficit  Goal: Knowledge of disease process/condition, treatment plan, diagnostic tests, and medications will improve  Outcome: PROGRESSING AS EXPECTED      Problem: Skin Integrity  Goal: Risk for impaired skin integrity will decrease  Outcome: PROGRESSING AS EXPECTED

## 2018-08-27 NOTE — PROGRESS NOTES
Internal Medicine Interval Note  Note Author: Flora Stack M.D.     Name Malena Milton       1940   Age/Sex 78 y.o. female   MRN [id number]   Code Status Full     After 5PM or if no immediate response to page, please call for cross-coverage  Attending/Team: Dr. Holly/Gary See Patient List for primary contact information  Call [phone] to page    1st Call - Day Intern (R1):   Dr. Stack 2nd Call - Day Sr. Resident (R2/R3):   Dr. Vicente         Reason for interval visit  (Principal Problem)   Sepsis, secondary to UTI    Interval Problem Daily Status Update  (24 hours, problem oriented, brief subjective history, new lab/imaging data pertinent to that problem)   Ms. Milton, a pleasant 78 year old female presented to the ER from group home with c/o poor appetite, nausea, and dysuria for 2 days, generalized weakness for 1 day. Pt was unable to get up. Pt denies any chills, abdominal pain, vomiting, urinary frequency or back pain. Pt has a history of recurrent UTI, she had 3 episodes in the past 12 months. This is the first time patient is admitted for urinary tract infection.    Overnight, Pt denies new complaints. Pt did not sleep throughout the night. She was on home melatonin. Son states that patient coughs when eating possibly due to dysphagia. Will consult speech evaluation to screen for dysphagia. Will follow up urine and blood culture.    Review of Systems   Constitutional: Negative for chills and fever.   HENT: Negative for congestion and sore throat.    Respiratory: Negative for cough and shortness of breath.    Cardiovascular: Negative for chest pain, orthopnea and leg swelling.   Gastrointestinal: Negative for abdominal pain, diarrhea, nausea and vomiting.   Genitourinary: Positive for dysuria. Negative for flank pain and frequency.   Musculoskeletal: Negative for back pain and joint pain.   Skin: Negative for rash.   Neurological: Negative for dizziness and headaches.    Psychiatric/Behavioral: Positive for memory loss (chronic short term). The patient is not nervous/anxious.        Disposition/Barriers to discharge:   Group home when medically stable/No barriers to discharge    Consultants/Specialty  PCP: JOSE ELIAS Becerra      Quality Measures  Quality-Core Measures   Reviewed items::  Labs reviewed and Medications reviewed  Tomas catheter::  No Tomas  DVT prophylaxis pharmacological::  Enoxaparin (Lovenox)  Ulcer Prophylaxis::  No  Antibiotics:  Treating active infection/contamination beyond 24 hours perioperative coverage          Physical Exam       Vitals:    08/26/18 1830 08/26/18 1936 08/27/18 0600 08/27/18 0800   BP:  106/69 139/82 132/79   Pulse: (!) 111 80 (!) 104 96   Resp:  18 17 17   Temp:  36.4 °C (97.5 °F) 37.1 °C (98.7 °F) 36.1 °C (97 °F)   SpO2: 93% 95% 94% 98%   Weight:  55.8 kg (123 lb 0.3 oz)     Height:         Body mass index is 24.03 kg/m². Weight: 55.8 kg (123 lb 0.3 oz)  Oxygen Therapy:  Pulse Oximetry: 98 %, O2 (LPM): 0, FiO2%: 21 %, O2 Delivery: None (Room Air)    Physical Exam   Constitutional: She is oriented to person, place, and time and well-developed, well-nourished, and in no distress.   HENT:   Head: Normocephalic and atraumatic.   Eyes: Pupils are equal, round, and reactive to light. EOM are normal.   Neck: Normal range of motion. Neck supple. No thyromegaly present.   Cardiovascular: Regular rhythm and normal heart sounds.  Tachycardia present.    No murmur heard.  Pulmonary/Chest: Effort normal. She has wheezes.   Bilateral basilar crackles   Abdominal: Soft. Bowel sounds are normal. She exhibits no distension. There is no tenderness.   Musculoskeletal: Normal range of motion. She exhibits no edema or tenderness.   Lymphadenopathy:     She has no cervical adenopathy.   Neurological: She is alert and oriented to person, place, and time. GCS score is 15.   Skin: Skin is warm and dry. No erythema.   Psychiatric: Mood and affect normal.        Assessment/Plan     Sepsis (HCC) secondary to UTI   Assessment & Plan    - Pt presented with fever, poor appetite, generalized weakness and dyuria  - Pt had tachycardia, tachypnea, fever, leucocytosis - Now resolved after initiating ABX and IV hydration  - Source of infection identified - urine  - Blood and urine culture ordered  - Lactate level was 1.4 on admission, improved to 1 after fluid resuscitation  - CXR done, no features of fluid overload or infiltrates  - Repeat CXR due to bilateral crackles  - Continue IVF 50mls/hr maintenance  - Continue Ceftrixone 1g daily  - Tylenol for fever  - f/u blood and urine culture  - incentive spirometry        H/O brain surgery   Assessment & Plan    - Unknown reason, tumor as per son  - Pt on Keppra        HTN (hypertension)- (present on admission)   Assessment & Plan    - continue home medications        HLD (hyperlipidemia)- (present on admission)   Assessment & Plan    - Continue home medications        History of stroke- (present on admission)   Assessment & Plan    - no acute symptoms  - continue home medications

## 2018-08-27 NOTE — ASSESSMENT & PLAN NOTE
- Pt presented with fever, poor appetite, generalized weakness and dyuria  --acute sepsis with SIRS 4/4 secondary to complicated UTI.  -s/p IV Ceftriaxone.  -Discharge on 10 days of bactrim.

## 2018-08-27 NOTE — PROGRESS NOTES
Two RN skin check performed.  Patient found to have blanchable redness to sacrum and a non-blanching dark area under right heel which may be a deep tissue injury. Heel was off loaded overnight with heel protector boots however wound did not change.   Wound care consult ordered.

## 2018-08-27 NOTE — REHAB-SLP IDT TEAM NOTE
"  Speech Language Therapy Clinical Swallow Evaluation completed.    CXR 08/26/18:   FINDINGS:   Low lung volume. Patchy bibasilar opacities.     No pleural effusion. No pneumothorax.     Stable cardiopericardial silhouette.    IMPRESSIONS:   1. Low lung volume with hypoventilatory change and bibasilar atelectasis.    Functional Status: Pt seen at bedside for swallow evaluation. Pt was awake, alert and partially participatory with therapy objectives. Pt followed 2-step directions during oral mech exam; however, declined all PO trials except for thin liquids (water) and dry solids (saltine crackers). Pt denied any baseline difficulties with chewing/swallowing and/or globus sensation. Pt. denied recent hx of PNA within the last two years. Pt demonstrated no subtle nor overt clinical s/s of aspiration with today's PO trials; however, Pt. was noted to cough x1 during today's session in the absence of PO intake (suggesting Pt's cough was not as a result of eating/drinking). Pt is recommended for Regular diet/thin liquids; however, would benefit from adherence to standard aspiration precautions. No further skilled SLP services are warranted for dysphagia at this time. Please re-consult this service if new/worsening symptoms arise.    Recommendations - Diet: Diet / Liquid Recommendation: Regular, Thin Liquid                          Strategies: Head of Bed at 90 Degrees, Small bites/sips, Alternate bites/sips                          Medication Administration: Medication Administration : Whole with Liquid Wash  Plan of Care: Patient with no further skilled SLP needs in the acute care setting at this time  Post-Acute Therapy: Discharge to a transitional care facility for continued skilled therapy services for PT/OT or Nursing.    See \"Rehab Therapy-Acute\" Patient Summary Report for complete documentation.   "

## 2018-08-27 NOTE — CARE PLAN
August 21, 2018      Abigail Deleon       [address] [address] [place], [place] [address] [address]      Dear Abigail,    There’s no question about it - preventive care can save lives or can help stop a disease process in its tracks. Many health problems start out silently without symptoms. Preventive care is often the only way to catch these problems in early stages, when they can be more successfully treated.    Our records show that you are due for the following tests or immunizations. If you have had these tests or immunizations done, please call us so we can update your record.    · Mammogram: Breast cancer is the second leading cause of cancer death in women in this country. A mammogram can find cancer long before a lump can be felt on the breast. To schedule your Mammogram, please call [phone]      Your good health is important to us. Please feel free to call my office at [phone] with any questions.    Also, our records show that we do not have a copy of an advance directive (Living Will or Power of  for Healthcare) document on file for you.  If you have an advance directive, please bring your document to your provider's office.      Sincerely,     Herbert Mathew MD  [address] [facility] [place] [address]     [employer] offers online access to your health information via [email or web address] .   By registering for [facility] you will be able to view test results, pay your bill, send messages to your care team (not for immediate response), refill prescriptions, schedule and verify appointments.     Problem: Infection  Goal: Will remain free from infection  Outcome: PROGRESSING SLOWER THAN EXPECTED  Patient continues on IV and PO antibiotics.     Problem: Respiratory:  Goal: Respiratory status will improve  Patient has crackles throughout. Encouraged patient to use incentive spirometer. Chest xray ordered. Albuterol inhaler ordered.

## 2018-08-27 NOTE — DISCHARGE PLANNING
Anticipated Discharge Disposition: Group Home    Action: LSW contacted [facility] (PH: [phone]) to verify information. Staff provides assistance with all ADL's and ambulation, and medication management. Pt uses W/C. Pt son provides transportation. No barriers to pt returning to group home.    Barriers to Discharge: None    Plan: LSW to continue to assess for discharge needs.

## 2018-08-27 NOTE — H&P
Internal Medicine Admitting History and Physical    Note Author: Flora Stack M.D.       Name Malena Milton       1940   Age/Sex 78 y.o. female   MRN [id number]   Code Status Full     After 5PM or if no immediate response to page, please call for cross-coverage  Attending/Team: Karina/blue See Patient List for primary contact information  Call [phone] to page    1st Call - Day Intern (R1):   Dr. Stack 2nd Call - Day Sr. Resident (R2/R3):   Dr. Vicente       Chief Complaint:   Generalized weakness  Poor appetite  dysuria    HPI:  Ms. Milton, a pleasant 78 year old male presented to the ER from group home with c/o poor appetite, nausea, and dysuria for 2 days, generalized weakness for 1 day. Pt was unable to get up. Pt denies any chills, abdominal pain, vomiting, urinary frequency or back pain. Pt has a history of recurrent UTI, she had 3 episodes in the past 12 months. This is the first time patient is admitted for urinary tract infection.    In the ER pt was found to have fever, tachycardia, and tachypnea. Her CBC showed leucocytosis, and urine showed nitrite positive, proteinuria, with  WBC. Pt also has a h/o chronic constipation and decreased water intack/ She has BM once every 3 days. Pt has dementia at baseline    Review of Systems   Constitutional: Negative for chills, diaphoresis and fever.   HENT: Negative for congestion and sore throat.    Respiratory: Negative for cough and shortness of breath.    Cardiovascular: Negative for chest pain, orthopnea and leg swelling.   Gastrointestinal: Positive for constipation and nausea. Negative for abdominal pain, diarrhea and vomiting.   Genitourinary: Positive for dysuria. Negative for flank pain and frequency.   Musculoskeletal: Negative for joint pain and neck pain.   Neurological: Positive for weakness. Negative for dizziness and headaches.   Psychiatric/Behavioral: The patient is not nervous/anxious and does not have insomnia.           Past Medical History (Chronic medical problem, known complications and current treatment)    Past Medical History:   Diagnosis Date   • CVA (cerebral vascular accident) (HCC)     Left side residual: LUE weakness, LLE weak contraction. LLE botox injection to knee.    • Hypertension    • Stroke (HCC) 06/2013     Past Surgical History:  Past Surgical History:   Procedure Laterality Date   • CRANIOTOMY BRAIN LAB  2013       Current Outpatient Medications:  Home Medications     Reviewed by Jose Fernández (Pharmacy Tech) on 08/26/18 at 1335  Med List Status: Complete   Medication Last Dose Status   alendronate (FOSAMAX) 70 MG Tab 8/22/2018 Active   atenolol (TENORMIN) 50 MG TABS 8/26/2018 Active   baclofen (LIORESAL) 10 MG Tab 8/25/2018 Active   clopidogrel (PLAVIX) 75 MG TABS 8/26/2018 Active   gabapentin (NEURONTIN) 300 MG Cap 8/25/2018 Active   levetiracetam (KEPPRA) 500 MG TABS 8/26/2018 Active   melatonin 3 MG Tab 8/25/2018 Active   nystatin (MYCOSTATIN) powder 8/26/2018 Active   pravastatin (PRAVACHOL) 40 MG tablet 8/25/2018 Active   senna-docusate (SENEXON-S) 8.6-50 MG Tab 8/26/2018 Active   vitamin D, Ergocalciferol, (DRISDOL) 14911 units Cap capsule 8/22/2018 Active                Medication Allergy/Sensitivities:  No Known Allergies      Family History (mandatory)   Family History   Problem Relation Age of Onset   • Breast Cancer Sister        Social History (mandatory)   Social History     Social History   • Marital status:      Spouse name: N/A   • Number of children: N/A   • Years of education: N/A     Occupational History   • Not on file.     Social History Main Topics   • Smoking status: Former Smoker   • Smokeless tobacco: Former User   • Alcohol use No   • Drug use: No   • Sexual activity: Not on file     Other Topics Concern   • Not on file     Social History Narrative   • No narrative on file     Living situation: Lives in group home  PCP : JOSE ELIAS Becerra    Physical Exam      Vitals:    08/26/18 1502 08/26/18 1530 08/26/18 1600 08/26/18 1752   BP:       Pulse: (!) 122 (!) 125 (!) 121    Resp: (!) 36 (!) 28 (!) 34    Temp:    36.4 °C (97.6 °F)   SpO2: 97% 96% 90%    Weight:       Height:         Body mass index is 19.33 kg/m².  /72   Pulse (!) 121   Temp 36.4 °C (97.6 °F)   Resp (!) 34   Ht 1.524 m (5')   Wt 44.9 kg (99 lb)   SpO2 90%   BMI 19.33 kg/m²   O2 therapy: Pulse Oximetry: 90 %, O2 (LPM): 2, O2 Delivery: Silicone Nasal Cannula    Physical Exam   Constitutional: She is oriented to person, place, and time and well-developed, well-nourished, and in no distress. No distress.   HENT:   Head: Normocephalic and atraumatic.   Eyes: Pupils are equal, round, and reactive to light. EOM are normal.   Neck: Normal range of motion. Neck supple. No thyromegaly present.   Cardiovascular: Regular rhythm and normal heart sounds.  Tachycardia present.    No murmur heard.  Crackles mid lungs   Pulmonary/Chest: Effort normal and breath sounds normal. No respiratory distress. She has no wheezes.   Abdominal: Soft. Bowel sounds are normal. She exhibits no distension. There is no hepatosplenomegaly. There is no tenderness. There is no CVA tenderness.   Musculoskeletal: Normal range of motion. She exhibits no edema or deformity.   Lymphadenopathy:     She has no cervical adenopathy.   Neurological: She is alert and oriented to person, place, and time. GCS score is 15.   Skin: Skin is warm and dry. She is not diaphoretic. No erythema.   Psychiatric: Mood and affect normal.         Data Review       Old Records Request:   Deferred  Current Records review/summary: Completed    Lab Data Review:  Recent Results (from the past 24 hour(s))   LACTIC ACID    Collection Time: 08/26/18  1:00 PM   Result Value Ref Range    Lactic Acid 1.4 0.5 - 2.0 mmol/L   CBC WITH DIFFERENTIAL    Collection Time: 08/26/18  1:00 PM   Result Value Ref Range    WBC 11.2 (H) 4.8 - 10.8 K/uL    RBC 3.84 (L) 4.20 - 5.40  M/uL    Hemoglobin 12.8 12.0 - 16.0 g/dL    Hematocrit 37.1 37.0 - 47.0 %    MCV 96.6 81.4 - 97.8 fL    MCH 33.3 (H) 27.0 - 33.0 pg    MCHC 34.5 33.6 - 35.0 g/dL    RDW 46.6 35.9 - 50.0 fL    Platelet Count 137 (L) 164 - 446 K/uL    MPV 10.9 9.0 - 12.9 fL    Nucleated RBC 0.00 /100 WBC    NRBC (Absolute) 0.00 K/uL    Neutrophils-Polys 76.10 (H) 44.00 - 72.00 %    Lymphocytes 4.40 (L) 22.00 - 41.00 %    Monocytes 15.90 (H) 0.00 - 13.40 %    Eosinophils 0.90 0.00 - 6.90 %    Basophils 0.00 0.00 - 1.80 %    Neutrophils (Absolute) 8.72 (H) 2.00 - 7.15 K/uL    Lymphs (Absolute) 0.49 (L) 1.00 - 4.80 K/uL    Monos (Absolute) 1.78 (H) 0.00 - 0.85 K/uL    Eos (Absolute) 0.10 0.00 - 0.51 K/uL    Baso (Absolute) 0.00 0.00 - 0.12 K/uL    Anisocytosis 1+     Macrocytosis 1+    COMP METABOLIC PANEL    Collection Time: 08/26/18  1:00 PM   Result Value Ref Range    Sodium 137 135 - 145 mmol/L    Potassium 3.9 3.6 - 5.5 mmol/L    Chloride 102 96 - 112 mmol/L    Co2 23 20 - 33 mmol/L    Anion Gap 12.0 (H) 0.0 - 11.9    Glucose 120 (H) 65 - 99 mg/dL    Bun 10 8 - 22 mg/dL    Creatinine 0.60 0.50 - 1.40 mg/dL    Calcium 9.1 8.5 - 10.5 mg/dL    AST(SGOT) 37 12 - 45 U/L    ALT(SGPT) 26 2 - 50 U/L    Alkaline Phosphatase 75 30 - 99 U/L    Total Bilirubin 1.0 0.1 - 1.5 mg/dL    Albumin 3.6 3.2 - 4.9 g/dL    Total Protein 8.0 6.0 - 8.2 g/dL    Globulin 4.4 (H) 1.9 - 3.5 g/dL    A-G Ratio 0.8 g/dL   TROPONIN    Collection Time: 08/26/18  1:00 PM   Result Value Ref Range    Troponin I <0.01 0.00 - 0.04 ng/mL   BNP    Collection Time: 08/26/18  1:00 PM   Result Value Ref Range    B Natriuretic Peptide 33 0 - 100 pg/mL   DIFFERENTIAL MANUAL    Collection Time: 08/26/18  1:00 PM   Result Value Ref Range    Bands-Stabs 1.80 0.00 - 10.00 %    Myelocytes 0.90 %    Manual Diff Status PERFORMED    PERIPHERAL SMEAR REVIEW    Collection Time: 08/26/18  1:00 PM   Result Value Ref Range    Peripheral Smear Review see below    PLATELET ESTIMATE     Collection Time: 18  1:00 PM   Result Value Ref Range    Plt Estimation Decreased    MORPHOLOGY    Collection Time: 18  1:00 PM   Result Value Ref Range    RBC Morphology Present     Giant Platelets 1+     Ovalocytes 1+    ESTIMATED GFR    Collection Time: 18  1:00 PM   Result Value Ref Range    GFR If African American >60 >60 mL/min/1.73 m 2    GFR If Non African American >60 >60 mL/min/1.73 m 2   EKG (ER)    Collection Time: 18  1:01 PM   Result Value Ref Range    Report       [facility] Emergency Dept.    Test Date:  2018  Pt Name:    CLAUS BECERRA                 Department: ER  MRN:        [id number]                      Room:        02  Gender:     Female                       Technician: [id number]  :        [id number]                   Requested By:JOSSE VELEZ  Order #:    [id number]                    Reading MD: JOSSE VELEZ MD    Measurements  Intervals                                Axis  Rate:       103                          P:          57  VT:         172                          QRS:        70  QRSD:       70                           T:          6  QT:         316  QTc:        414    Interpretive Statements  SINUS TACHYCARDIA  BORDERLINE T ABNORMALITIES, ANTERIOR LEADS  Compared to ECG 2018 11:34:05  No significant changes    Electronically Signed On 2018 14:08:13 PDT by JOSSE VELEZ MD     URINALYSIS    Collection Time: 18  1:15 PM   Result Value Ref Range    Color DK Yellow     Character Turbid (A)     Specific Gravity 1.018 <1.035    Ph 6.5 5.0 - 8.0    Glucose Negative Negative mg/dL    Ketones Negative Negative mg/dL    Protein 300 (A) Negative mg/dL    Bilirubin Negative Negative    Urobilinogen, Urine 1.0 Negative    Nitrite Positive (A) Negative    Leukocyte Esterase Small (A) Negative    Occult Blood Moderate (A) Negative    Micro Urine Req Microscopic    URINE MICROSCOPIC (W/UA)    Collection Time: 18   1:15 PM   Result Value Ref Range    WBC  (A) /hpf    RBC 5-10 (A) /hpf    Bacteria Many (A) None /hpf    Epithelial Cells Moderate (A) /hpf   LACTIC ACID    Collection Time: 08/26/18  3:30 PM   Result Value Ref Range    Lactic Acid 1.1 0.5 - 2.0 mmol/L       Imaging/Procedures Review:    Independant Imaging Review: Completed       DX-CHEST-PORTABLE (1 VIEW)   Final Result         1. Low lung volume with hypoventilatory change and bibasilar atelectasis.           EKG:   EKG Independant Review: Completed  QTc 414, HR: 103, Normal Sinus Rhythm, no ST/T changes     Records reviewed and summarized in current documentation :  Yes  [employer] teaching service handout given to patient:  No         Assessment/Plan     Sepsis (HCC) secondary to UTI   Assessment & Plan    - Pt presented with fever, poor appetite, generalized weakness and dyuria  - Pt has tachycardia, tachypnea, fever,   - Source of infection identified - urine  - Blood and urine culture ordered  - Lactate level was 1.4 on admission  - IV Normal saline 1000mls and 2g IV ceftrixone given in the ER  - CXR done, no features of fluid overload or infiltrates    Plan  - Additional 1000mls of IV normal saline followed by 50mls/hr maintenance  - Continue Ceftrixone 1g daily  - Tylenol for fever        H/O brain surgery   Assessment & Plan    - Unknown reason, tumor as per son  - Pt on Keppra        HTN (hypertension)- (present on admission)   Assessment & Plan    - continue home medications        HLD (hyperlipidemia)- (present on admission)   Assessment & Plan    - Continue home medications        History of stroke- (present on admission)   Assessment & Plan    - no acute symptoms  - continue medications            Anticipated Hospital stay:  >2 midnights        Quality Measures  Quality-Core Measures  PCP: JOSE ELIAS Becerra

## 2018-08-27 NOTE — DISCHARGE PLANNING
Anticipated Discharge Disposition: TBD    Action: LSW conducted chart review. Pt son involved in care. No significant prior discharge planning notes.     Pt lives in group home, confusion at baseline. Family present at bedside often.     Barriers to Discharge: No barriers identified at this time.    Plan: LSW to contact group home, continue to assess for discharge needs.

## 2018-08-27 NOTE — DISCHARGE PLANNING
LSW received IP Consult to assist with advance directive. Pt confused with dementia. Pt is not able to sign legal documentation, including Advanced Directive.

## 2018-08-27 NOTE — PROGRESS NOTES
Family asking that patient receive medication to help her sleep. Patient normally takes three Melatonin (3mg) tabs QHS. This medication was previously ordered however discontinued by pharmacy due to lack of availability (medication not carried by pharmacy).     Discussed above with REED Portillo MD on-call for [employer] team who ordered 25mg of benadryl.    Patient got little sleep overnight, will need to follow up with rounding MD regarding change in medication.

## 2018-08-27 NOTE — PROGRESS NOTES
Ms. Milton tolerated last night's procedures / treatments without major incident. Patient did not get much sleep but denies any complaints this AM.     At end of shift patient laying in bed, denies any complaints and does not display any signs of distress. Patient care to be endorsed to AM nurse.

## 2018-08-28 ENCOUNTER — APPOINTMENT (OUTPATIENT)
Dept: RADIOLOGY | Facility: MEDICAL CENTER | Age: 78
DRG: 872 | End: 2018-08-28
Attending: STUDENT IN AN ORGANIZED HEALTH CARE EDUCATION/TRAINING PROGRAM
Payer: MEDICARE

## 2018-08-28 PROBLEM — N39.0 COMPLICATED UTI (URINARY TRACT INFECTION): Status: ACTIVE | Noted: 2018-08-28

## 2018-08-28 LAB
ANION GAP SERPL CALC-SCNC: 6 MMOL/L (ref 0–11.9)
BACTERIA UR CULT: ABNORMAL
BUN SERPL-MCNC: 3 MG/DL (ref 8–22)
CALCIUM SERPL-MCNC: 8.1 MG/DL (ref 8.5–10.5)
CHLORIDE SERPL-SCNC: 107 MMOL/L (ref 96–112)
CO2 SERPL-SCNC: 25 MMOL/L (ref 20–33)
CREAT SERPL-MCNC: 0.54 MG/DL (ref 0.5–1.4)
ERYTHROCYTE [DISTWIDTH] IN BLOOD BY AUTOMATED COUNT: 48.9 FL (ref 35.9–50)
GLUCOSE SERPL-MCNC: 126 MG/DL (ref 65–99)
HCT VFR BLD AUTO: 36.4 % (ref 37–47)
HGB BLD-MCNC: 11.6 G/DL (ref 12–16)
MCH RBC QN AUTO: 32.4 PG (ref 27–33)
MCHC RBC AUTO-ENTMCNC: 31.9 G/DL (ref 33.6–35)
MCV RBC AUTO: 101.7 FL (ref 81.4–97.8)
PLATELET # BLD AUTO: 135 K/UL (ref 164–446)
PMV BLD AUTO: 10.8 FL (ref 9–12.9)
POTASSIUM SERPL-SCNC: 2.8 MMOL/L (ref 3.6–5.5)
PROCALCITONIN SERPL-MCNC: 0.06 NG/ML
RBC # BLD AUTO: 3.58 M/UL (ref 4.2–5.4)
SIGNIFICANT IND 70042: ABNORMAL
SITE SITE: ABNORMAL
SODIUM SERPL-SCNC: 138 MMOL/L (ref 135–145)
SOURCE SOURCE: ABNORMAL
WBC # BLD AUTO: 10.9 K/UL (ref 4.8–10.8)

## 2018-08-28 PROCEDURE — 85027 COMPLETE CBC AUTOMATED: CPT

## 2018-08-28 PROCEDURE — 770006 HCHG ROOM/CARE - MED/SURG/GYN SEMI*

## 2018-08-28 PROCEDURE — A9270 NON-COVERED ITEM OR SERVICE: HCPCS | Performed by: STUDENT IN AN ORGANIZED HEALTH CARE EDUCATION/TRAINING PROGRAM

## 2018-08-28 PROCEDURE — 80048 BASIC METABOLIC PNL TOTAL CA: CPT

## 2018-08-28 PROCEDURE — 700102 HCHG RX REV CODE 250 W/ 637 OVERRIDE(OP): Performed by: STUDENT IN AN ORGANIZED HEALTH CARE EDUCATION/TRAINING PROGRAM

## 2018-08-28 PROCEDURE — 700111 HCHG RX REV CODE 636 W/ 250 OVERRIDE (IP): Performed by: INTERNAL MEDICINE

## 2018-08-28 PROCEDURE — 71045 X-RAY EXAM CHEST 1 VIEW: CPT

## 2018-08-28 PROCEDURE — 700111 HCHG RX REV CODE 636 W/ 250 OVERRIDE (IP): Performed by: STUDENT IN AN ORGANIZED HEALTH CARE EDUCATION/TRAINING PROGRAM

## 2018-08-28 PROCEDURE — 36415 COLL VENOUS BLD VENIPUNCTURE: CPT

## 2018-08-28 PROCEDURE — 700105 HCHG RX REV CODE 258: Performed by: STUDENT IN AN ORGANIZED HEALTH CARE EDUCATION/TRAINING PROGRAM

## 2018-08-28 PROCEDURE — 99232 SBSQ HOSP IP/OBS MODERATE 35: CPT | Mod: GC | Performed by: INTERNAL MEDICINE

## 2018-08-28 PROCEDURE — 84145 PROCALCITONIN (PCT): CPT

## 2018-08-28 RX ADMIN — PRAVASTATIN SODIUM 40 MG: 20 TABLET ORAL at 21:38

## 2018-08-28 RX ADMIN — CLOPIDOGREL 75 MG: 75 TABLET, FILM COATED ORAL at 05:38

## 2018-08-28 RX ADMIN — BACLOFEN 10 MG: 10 TABLET ORAL at 21:38

## 2018-08-28 RX ADMIN — POTASSIUM BICARBONATE 50 MEQ: 25 TABLET, EFFERVESCENT ORAL at 18:08

## 2018-08-28 RX ADMIN — ENOXAPARIN SODIUM 30 MG: 100 INJECTION SUBCUTANEOUS at 05:38

## 2018-08-28 RX ADMIN — ATENOLOL 50 MG: 50 TABLET ORAL at 05:38

## 2018-08-28 RX ADMIN — BACLOFEN 10 MG: 10 TABLET ORAL at 05:38

## 2018-08-28 RX ADMIN — TRAZODONE HYDROCHLORIDE 50 MG: 50 TABLET ORAL at 21:38

## 2018-08-28 RX ADMIN — GABAPENTIN 300 MG: 300 CAPSULE ORAL at 21:38

## 2018-08-28 RX ADMIN — BACLOFEN 10 MG: 10 TABLET ORAL at 13:38

## 2018-08-28 RX ADMIN — LEVETIRACETAM 500 MG: 500 TABLET ORAL at 08:23

## 2018-08-28 RX ADMIN — CEFTRIAXONE SODIUM 1 G: 1 INJECTION, POWDER, FOR SOLUTION INTRAMUSCULAR; INTRAVENOUS at 05:39

## 2018-08-28 RX ADMIN — LEVETIRACETAM 500 MG: 500 TABLET ORAL at 21:37

## 2018-08-28 RX ADMIN — POTASSIUM BICARBONATE 50 MEQ: 25 TABLET, EFFERVESCENT ORAL at 08:23

## 2018-08-28 RX ADMIN — SODIUM CHLORIDE: 9 INJECTION, SOLUTION INTRAVENOUS at 14:37

## 2018-08-28 ASSESSMENT — ENCOUNTER SYMPTOMS
SORE THROAT: 0
VOMITING: 0
MEMORY LOSS: 1
FEVER: 0
NAUSEA: 0
ORTHOPNEA: 0
BACK PAIN: 0
ABDOMINAL PAIN: 0
FLANK PAIN: 0
CHILLS: 0
DIZZINESS: 0
HEADACHES: 0
SHORTNESS OF BREATH: 0
DIARRHEA: 0
NERVOUS/ANXIOUS: 0
COUGH: 0

## 2018-08-28 ASSESSMENT — PAIN SCALES - GENERAL
PAINLEVEL_OUTOF10: 0

## 2018-08-28 NOTE — PROGRESS NOTES
Pt is refuses to get CXRay done at this time; says she needs to discuss it with her son first, and if they agree to it she'll have it done tomorrow

## 2018-08-28 NOTE — PROGRESS NOTES
Assumed care of pt at 0700. Received report from RN. Pt A&Ox2 (disoriented to time and event). Assessment complete. Labs reviewed. Pt denies pain at this time. Pt confused and tearful, continues to ask for son. This RN reassured patient of POC and that patient is safe with us. Pt Q2 turns, incontinent x 2.  Pt and RN discussed plan of care. Pt questions answered. Pt needs are met at this time. Bed in lowest and locked position. Call light within reach. Upper bed rails up. Hourly rounding in place.

## 2018-08-28 NOTE — ASSESSMENT & PLAN NOTE
- H/O recurrent UTI  - presented with sepsis which is now resolved  - Urine culture - E.coli - sensitive to all antibiotics tested  - D/C on 10 days of bactrim.

## 2018-08-28 NOTE — PROGRESS NOTES
Patient is doing well. Occasional cough. Speech therapy evaluated patient and cleared her. Patient continues with albuterol inhaler and IV antibiotics. Portable chest xray ordered. Patient is being repositioned every 2 hours and incontinence care is provided as needed. Patient's son helps with patient care as well. Patient's skin is intact.     Hourly rounding continued.

## 2018-08-28 NOTE — DISCHARGE PLANNING
Anticipated Discharge Disposition: Return to [facility]    Action: LSW met with pt and son at bedside. Son stated that he will provided transportation back to [facility] and has been in contact with staff. Son will update group home staff on discharge.    Barriers to Discharge: None    Plan: No additional discharge needs at this time.

## 2018-08-28 NOTE — PROGRESS NOTES
Pt is complaining of abdominal pain 10/10; says morphine doesn't last long and that pain med he receives in ED worked better. Pt received 1mg of IV dilaudid in ER. Call placed to hosp. Callback received from Dr Escobedo, order for 1mg of IV dilaudid received

## 2018-08-28 NOTE — CARE PLAN
Problem: Skin Integrity  Goal: Risk for impaired skin integrity will decrease  Outcome: PROGRESSING AS EXPECTED  Pt checked frequently d/t episodes of incontinence, Q2 turns in place.     Problem: Psychosocial Needs:  Goal: Level of anxiety will decrease  Outcome: PROGRESSING SLOWER THAN EXPECTED  Pt very anxious and tearful this AM. Pt son called to update on patient status, so currently at bedside. Pt calm and resting at this time.

## 2018-08-28 NOTE — PROGRESS NOTES
Internal Medicine Interval Note  Note Author: Flora Stack M.D.     Name Malena Milton       1940   Age/Sex 78 y.o. female   MRN [id number]   Code Status Full     After 5PM or if no immediate response to page, please call for cross-coverage  Attending/Team: Dr. Holly/Gary See Patient List for primary contact information  Call [phone] to page    1st Call - Day Intern (R1):   Dr. Stack 2nd Call - Day Sr. Resident (R2/R3):   Dr. Vicente         Reason for interval visit  (Principal Problem)   Sepsis, secondary to UTI    Interval Problem Daily Status Update  (24 hours, problem oriented, brief subjective history, new lab/imaging data pertinent to that problem)   Ms. Milton, a pleasant 78 year old female presented to the ER from group home with c/o poor appetite, nausea, and dysuria for 2 days, generalized weakness for 1 day. Pt was unable to get up. Pt denies any chills, abdominal pain, vomiting, urinary frequency or back pain. Pt has a history of recurrent UTI, she had 3 episodes in the past 12 months. This is the first time patient is admitted for urinary tract infection.    Overnight, Pt denies new complaints. Swallow evaluation cleared patient and can take regular meals without any difficulty. Repeat CXR did not show features of pneumonia. Urine culture grew E. Coli, sensitive to all abx tested. Will observe for 1 more day as we replace her potassium and discharge tomorrow on oral antibiotics if  stable.    Review of Systems   Constitutional: Negative for chills and fever.   HENT: Negative for congestion and sore throat.    Respiratory: Negative for cough and shortness of breath.    Cardiovascular: Negative for chest pain, orthopnea and leg swelling.   Gastrointestinal: Negative for abdominal pain, diarrhea, nausea and vomiting.   Genitourinary: Negative for dysuria, flank pain and frequency.   Musculoskeletal: Negative for back pain and joint pain.   Skin: Negative for rash.   Neurological:  Negative for dizziness and headaches.   Psychiatric/Behavioral: Positive for memory loss (chronic short term). The patient is not nervous/anxious.        Disposition/Barriers to discharge:   Group home when medically stable/No barriers to discharge    Consultants/Specialty  PCP: JOSE ELIAS Becerra      Quality Measures  Quality-Core Measures   Reviewed items::  Labs reviewed and Medications reviewed  Tomas catheter::  No Tomas  DVT prophylaxis pharmacological::  Enoxaparin (Lovenox)  Ulcer Prophylaxis::  No  Antibiotics:  Treating active infection/contamination beyond 24 hours perioperative coverage      Physical Exam       Vitals:    08/27/18 1645 08/27/18 1921 08/28/18 0300 08/28/18 0759   BP: 115/70 123/68 137/87 139/79   Pulse: 95 (!) 112 98 86   Resp: 17 17 18 16   Temp: 36.7 °C (98 °F) 37.1 °C (98.7 °F) 36.8 °C (98.3 °F) 37.1 °C (98.7 °F)   SpO2:  92% 94% 94%   Weight:       Height:         Body mass index is 24.03 kg/m².    Oxygen Therapy:  Pulse Oximetry: 94 %, O2 (LPM): 0, O2 Delivery: None (Room Air)    Physical Exam   Constitutional: She is oriented to person, place, and time and well-developed, well-nourished, and in no distress.   HENT:   Head: Normocephalic and atraumatic.   Eyes: Pupils are equal, round, and reactive to light. EOM are normal.   Neck: Normal range of motion. Neck supple. No thyromegaly present.   Cardiovascular: Normal rate, regular rhythm and normal heart sounds.    No murmur heard.  Pulmonary/Chest: Effort normal.   Abdominal: Soft. Bowel sounds are normal. She exhibits no distension. There is no tenderness.   Musculoskeletal: Normal range of motion. She exhibits no edema or tenderness.   Lymphadenopathy:     She has no cervical adenopathy.   Neurological: She is alert and oriented to person, place, and time. GCS score is 15.   Skin: Skin is warm and dry. No erythema.   Psychiatric: Mood and affect normal.       Assessment/Plan     Complicated UTI (urinary tract infection)    Assessment & Plan    - H/O recurrent UTI  - presented with sepsis which is now resolved  - Urine culture - E.coli - sensitive to all antibiotics tested  - continue with IV antibiotic  - Will discharge with oral antibiotic to complete full 10 days course        Sepsis secondary to complicated UTI(HCC)   Assessment & Plan    - Pt presented with fever, poor appetite, generalized weakness and dyuria  - Pt had tachycardia, tachypnea, fever, leucocytosis - Now resolved after initiating ABX and IV hydration  - Source of infection identified - urine  - Lactate level was 1.4 on admission, improved to 1 after fluid resuscitation  - CXR done, no features of fluid overload or infiltrates  - Repeat CXR - no changes, unlikely to be pneumonia  - Sepsis resolved  - Continue IVF 50mls/hr maintenance  - Continue Ceftrixone 1g daily  - Tylenol for fever  - Urine culture - E.coli sensitive to all abx  - incentive spirometry        Hypokalemia- (present on admission)   Assessment & Plan    - K = 2.8 ( 8/28/2018) could be due to albuterol  - Replace with K supplement  - Will recheck and monitor        H/O brain surgery   Assessment & Plan    - Unknown reason, tumor as per son  - Pt on Keppra        HTN (hypertension)- (present on admission)   Assessment & Plan    - continue home medications        HLD (hyperlipidemia)- (present on admission)   Assessment & Plan    - Continue home medications        History of stroke- (present on admission)   Assessment & Plan    - no acute symptoms  - continue home medications

## 2018-08-29 ENCOUNTER — PATIENT OUTREACH (OUTPATIENT)
Dept: HEALTH INFORMATION MANAGEMENT | Facility: OTHER | Age: 78
End: 2018-08-29

## 2018-08-29 VITALS
OXYGEN SATURATION: 96 % | TEMPERATURE: 98.4 F | RESPIRATION RATE: 18 BRPM | WEIGHT: 123.02 LBS | BODY MASS INDEX: 24.15 KG/M2 | DIASTOLIC BLOOD PRESSURE: 88 MMHG | HEIGHT: 60 IN | SYSTOLIC BLOOD PRESSURE: 141 MMHG | HEART RATE: 88 BPM

## 2018-08-29 LAB
ALBUMIN SERPL BCP-MCNC: 2.9 G/DL (ref 3.2–4.9)
ALBUMIN/GLOB SERPL: 0.7 G/DL
ALP SERPL-CCNC: 64 U/L (ref 30–99)
ALT SERPL-CCNC: 18 U/L (ref 2–50)
ANION GAP SERPL CALC-SCNC: 10 MMOL/L (ref 0–11.9)
AST SERPL-CCNC: 19 U/L (ref 12–45)
BASOPHILS # BLD AUTO: 0.1 % (ref 0–1.8)
BASOPHILS # BLD: 0.01 K/UL (ref 0–0.12)
BILIRUB SERPL-MCNC: 0.5 MG/DL (ref 0.1–1.5)
BUN SERPL-MCNC: 4 MG/DL (ref 8–22)
CALCIUM SERPL-MCNC: 8.4 MG/DL (ref 8.5–10.5)
CHLORIDE SERPL-SCNC: 111 MMOL/L (ref 96–112)
CO2 SERPL-SCNC: 22 MMOL/L (ref 20–33)
CREAT SERPL-MCNC: 0.41 MG/DL (ref 0.5–1.4)
EOSINOPHIL # BLD AUTO: 0.02 K/UL (ref 0–0.51)
EOSINOPHIL NFR BLD: 0.3 % (ref 0–6.9)
ERYTHROCYTE [DISTWIDTH] IN BLOOD BY AUTOMATED COUNT: 47.7 FL (ref 35.9–50)
FOLATE SERPL-MCNC: 17.1 NG/ML
GLOBULIN SER CALC-MCNC: 3.9 G/DL (ref 1.9–3.5)
GLUCOSE SERPL-MCNC: 116 MG/DL (ref 65–99)
HCT VFR BLD AUTO: 34.5 % (ref 37–47)
HGB BLD-MCNC: 11.6 G/DL (ref 12–16)
IMM GRANULOCYTES # BLD AUTO: 0.23 K/UL (ref 0–0.11)
IMM GRANULOCYTES NFR BLD AUTO: 3.4 % (ref 0–0.9)
LYMPHOCYTES # BLD AUTO: 1.05 K/UL (ref 1–4.8)
LYMPHOCYTES NFR BLD: 15.6 % (ref 22–41)
MAGNESIUM SERPL-MCNC: 1.9 MG/DL (ref 1.5–2.5)
MCH RBC QN AUTO: 33.3 PG (ref 27–33)
MCHC RBC AUTO-ENTMCNC: 33.6 G/DL (ref 33.6–35)
MCV RBC AUTO: 99.1 FL (ref 81.4–97.8)
MONOCYTES # BLD AUTO: 0.88 K/UL (ref 0–0.85)
MONOCYTES NFR BLD AUTO: 13.1 % (ref 0–13.4)
NEUTROPHILS # BLD AUTO: 4.52 K/UL (ref 2–7.15)
NEUTROPHILS NFR BLD: 67.5 % (ref 44–72)
NRBC # BLD AUTO: 0 K/UL
NRBC BLD-RTO: 0 /100 WBC
PLATELET # BLD AUTO: 156 K/UL (ref 164–446)
PMV BLD AUTO: 10.8 FL (ref 9–12.9)
POTASSIUM SERPL-SCNC: 3.3 MMOL/L (ref 3.6–5.5)
PROT SERPL-MCNC: 6.8 G/DL (ref 6–8.2)
RBC # BLD AUTO: 3.48 M/UL (ref 4.2–5.4)
SODIUM SERPL-SCNC: 143 MMOL/L (ref 135–145)
VIT B12 SERPL-MCNC: 492 PG/ML (ref 211–911)
WBC # BLD AUTO: 6.7 K/UL (ref 4.8–10.8)

## 2018-08-29 PROCEDURE — 83735 ASSAY OF MAGNESIUM: CPT

## 2018-08-29 PROCEDURE — 700111 HCHG RX REV CODE 636 W/ 250 OVERRIDE (IP): Performed by: INTERNAL MEDICINE

## 2018-08-29 PROCEDURE — A9270 NON-COVERED ITEM OR SERVICE: HCPCS | Performed by: INTERNAL MEDICINE

## 2018-08-29 PROCEDURE — 82746 ASSAY OF FOLIC ACID SERUM: CPT

## 2018-08-29 PROCEDURE — 700102 HCHG RX REV CODE 250 W/ 637 OVERRIDE(OP): Performed by: INTERNAL MEDICINE

## 2018-08-29 PROCEDURE — 36415 COLL VENOUS BLD VENIPUNCTURE: CPT

## 2018-08-29 PROCEDURE — 85025 COMPLETE CBC W/AUTO DIFF WBC: CPT

## 2018-08-29 PROCEDURE — 82607 VITAMIN B-12: CPT

## 2018-08-29 PROCEDURE — A9270 NON-COVERED ITEM OR SERVICE: HCPCS | Performed by: STUDENT IN AN ORGANIZED HEALTH CARE EDUCATION/TRAINING PROGRAM

## 2018-08-29 PROCEDURE — 700105 HCHG RX REV CODE 258: Performed by: STUDENT IN AN ORGANIZED HEALTH CARE EDUCATION/TRAINING PROGRAM

## 2018-08-29 PROCEDURE — 700111 HCHG RX REV CODE 636 W/ 250 OVERRIDE (IP): Performed by: STUDENT IN AN ORGANIZED HEALTH CARE EDUCATION/TRAINING PROGRAM

## 2018-08-29 PROCEDURE — 700102 HCHG RX REV CODE 250 W/ 637 OVERRIDE(OP): Performed by: STUDENT IN AN ORGANIZED HEALTH CARE EDUCATION/TRAINING PROGRAM

## 2018-08-29 PROCEDURE — 80053 COMPREHEN METABOLIC PANEL: CPT

## 2018-08-29 PROCEDURE — 99239 HOSP IP/OBS DSCHRG MGMT >30: CPT | Mod: GC | Performed by: INTERNAL MEDICINE

## 2018-08-29 RX ORDER — SULFAMETHOXAZOLE AND TRIMETHOPRIM 800; 160 MG/1; MG/1
1 TABLET ORAL 2 TIMES DAILY
Qty: 20 TAB | Refills: 0 | Status: SHIPPED | OUTPATIENT
Start: 2018-08-29

## 2018-08-29 RX ADMIN — ATENOLOL 50 MG: 50 TABLET ORAL at 05:48

## 2018-08-29 RX ADMIN — SODIUM CHLORIDE: 9 INJECTION, SOLUTION INTRAVENOUS at 11:37

## 2018-08-29 RX ADMIN — ENOXAPARIN SODIUM 30 MG: 100 INJECTION SUBCUTANEOUS at 05:48

## 2018-08-29 RX ADMIN — POTASSIUM BICARBONATE 50 MEQ: 25 TABLET, EFFERVESCENT ORAL at 11:37

## 2018-08-29 RX ADMIN — LEVETIRACETAM 500 MG: 500 TABLET ORAL at 08:45

## 2018-08-29 RX ADMIN — CLOPIDOGREL 75 MG: 75 TABLET, FILM COATED ORAL at 05:48

## 2018-08-29 RX ADMIN — BACLOFEN 10 MG: 10 TABLET ORAL at 13:25

## 2018-08-29 RX ADMIN — CEFTRIAXONE SODIUM 1 G: 1 INJECTION, POWDER, FOR SOLUTION INTRAMUSCULAR; INTRAVENOUS at 05:49

## 2018-08-29 RX ADMIN — BACLOFEN 10 MG: 10 TABLET ORAL at 05:48

## 2018-08-29 ASSESSMENT — ENCOUNTER SYMPTOMS
HEADACHES: 0
FLANK PAIN: 0
BACK PAIN: 0
DIARRHEA: 0
MEMORY LOSS: 1
NERVOUS/ANXIOUS: 0
VOMITING: 0
ABDOMINAL PAIN: 0
DIZZINESS: 0
SHORTNESS OF BREATH: 0
ORTHOPNEA: 0
COUGH: 0
NAUSEA: 0
CHILLS: 0
SORE THROAT: 0
FEVER: 0

## 2018-08-29 ASSESSMENT — PAIN SCALES - GENERAL
PAINLEVEL_OUTOF10: 0
PAINLEVEL_OUTOF10: 0

## 2018-08-29 NOTE — PROGRESS NOTES
Assumed care of pt after receiving report from dayshift RN. Bedside rounding completed w/ dayshift RN. Pt resting in bed A&OX2, self and place only, w/ no complaints of pain or discomfort, pt given snacks w/ medication, Q2 turns in place, fluids running, Fall measures in place, bed alarm is on,  call light within reach, personal belongings nearby, bed in lowest position, and hourly rounding in place. Will continue to monitor pt.

## 2018-08-29 NOTE — CARE PLAN
Problem: Safety  Goal: Will remain free from injury  Outcome: PROGRESSING AS EXPECTED  Pt educated on use of call light. Pt family at bedside to assist with RN staff. Pt bed alarm in use, pt Q2 turns, barrier cream applied.     Problem: Discharge Barriers/Planning  Goal: Patient's continuum of care needs will be met  Outcome: PROGRESSING AS EXPECTED  Pt updated on discharge plan. Awaiting discharge orders from MD for patient to go back to .

## 2018-08-29 NOTE — PROGRESS NOTES
Pt to be discharged to [facility]. Pt son, Arnulfo, to provide wheelchair and drive patient to group home. PIV removed prior to discharge. Discharge instructions and Rx provided and given to patient son.

## 2018-08-29 NOTE — DISCHARGE INSTRUCTIONS
Discharge Instructions    Discharged to group home by car with relative. Discharged via wheelchair, hospital escort: Yes.  Special equipment needed: Not Applicable    Be sure to schedule a follow-up appointment with your primary care doctor or any specialists as instructed.     Discharge Plan:   Diet Plan: Discussed  Activity Level: Discussed  Confirmed Follow up Appointment: Patient to Call and Schedule Appointment  Confirmed Symptoms Management: Discussed  Medication Reconciliation Updated: Yes  Pneumococcal Vaccine Administered/Refused: Not given - Patient refused pneumococcal vaccine  Influenza Vaccine Indication: Not indicated: Previously immunized this influenza season and > 8 years of age (Poncho (son) states patient is up-to-date on vaccines)    I understand that a diet low in cholesterol, fat, and sodium is recommended for good health. Unless I have been given specific instructions below for another diet, I accept this instruction as my diet prescription.   Other diet: Regular, High Potassium    Special Instructions: None    · Is patient discharged on Warfarin / Coumadin?   No     Depression / Suicide Risk    As you are discharged from this [facility] facility, it is important to learn how to keep safe from harming yourself.    Recognize the warning signs:  · Abrupt changes in personality, positive or negative- including increase in energy   · Giving away possessions  · Change in eating patterns- significant weight changes-  positive or negative  · Change in sleeping patterns- unable to sleep or sleeping all the time   · Unwillingness or inability to communicate  · Depression  · Unusual sadness, discouragement and loneliness  · Talk of wanting to die  · Neglect of personal appearance   · Rebelliousness- reckless behavior  · Withdrawal from people/activities they love  · Confusion- inability to concentrate     If you or a loved one observes any of these behaviors or has concerns about self-harm, here's what  you can do:  · Talk about it- your feelings and reasons for harming yourself  · Remove any means that you might use to hurt yourself (examples: pills, rope, extension cords, firearm)  · Get professional help from the community (Mental Health, Substance Abuse, psychological counseling)  · Do not be alone:Call your Safe Contact- someone whom you trust who will be there for you.  · Call your local CRISIS HOTLINE 588-9124 or 159-506-1970  · Call your local Children's Mobile Crisis Response Team [place] (431) 869-8114 or www[employer]  · Call the toll free National Suicide Prevention Hotlines   · National Suicide Prevention Lifeline 642-959-IUYL (0262)  · National [employer] Line Network 800-SUICIDE (875-4384)          Acute Urinary Retention, Female  Urinary retention means you are unable to pee completely or at all (empty your bladder).  Follow these instructions at home:  · Drink enough fluids to keep your pee (urine) clear or pale yellow.  · If you are sent home with a tube that drains the bladder (catheter), there will be a drainage bag attached to it. There are two types of bags. One is big that you can wear at night without having to empty it. One is smaller and needs to be emptied more often.  ¨ Keep the drainage bag emptied.  ¨ Keep the drainage bag lower than the tube.  · Only take medicine as told by your doctor.  Contact a doctor if:  · You have a low-grade fever.  · You have spasms or you are leaking pee when you have spasms.  Get help right away if:  · You have chills or a fever.  · Your catheter stops draining pee.  · Your catheter falls out.  · You have increased bleeding that does not stop after you have rested and increased the amount of fluids you had been drinking.

## 2018-08-30 NOTE — DISCHARGE SUMMARY
Internal Medicine Discharge Summary  Note Author: Jolie Vicente M.D.       Name Malena Milton 1940   Age/Sex 78 y.o. female   MRN [id number]         Admit Date:  2018       Discharge Date:  2018  Service:   [facility] Internal Medicine blue Team  Attending Physician(s):   Dr Holly  Senior Resident(s):   Dr Vicente  Mohinder Resident(s):   Dr Stack  PCP: JOSE ELIAS Becerra      Primary Diagnosis:   1)Sepsis secondary to complicated UTI  2)History of recurrent UTIs      Secondary Diagnoses:                Active Problems:    Hypokalemia POA: Yes    Sepsis secondary to complicated UTI(HCC) POA: Unknown    Complicated UTI (urinary tract infection) POA: Unknown    History of stroke POA: Yes    HLD (hyperlipidemia) POA: Yes    HTN (hypertension) POA: Yes    H/O brain surgery POA: Unknown  Resolved Problems:    * No resolved hospital problems. *      Hospital Summary (Brief Narrative):       78-year-old female with a past medical history of brain tumor status post resection and currently on Keppra, history of recurrent UTI, history of probable stroke with left sided deficit, hypertension, dyslipidemia presented to the ER on 2018 with 2 day history of generalized weakness, poor appetite nausea and dysuria.  Please note most of the history was gathered through his son.  Per son patient has had recurrent history of UTI -3 episodes in the past 12 months and this was the first time patient was being admitted.    On admission patient was slightly tachycardic with heart rate of 104, fever of more than 100.8 and slight tachypnea with leukocytosis 11.2 with left neutrophilic shift(SIRS 4/4).  Lactic acid was normal1.4 on admission and patient received 1 L of fluid bolus.  UA showed turbid color, 300 protein, positive nitrites, small leukocyte Esterase positive,  WBCs with 5-10 RBCs and many bacteria.  She was further admitted for sepsis probably secondary to acute complicated cystitis.  She  was started on IV ceftriaxone and urine culture was positive for E. coli pansensitive 10^5 colony-forming units.  Patient remained afebrile with improved UTI-like symptoms and is being discharged on 10 day course of Bactrim for her complicated cystitis.She did had bilateral basilar crackles on admission with normal BNP 33.   Chest x-ray did not show any signs of infiltrates or fluid overload but atelectasis. There was some concern for possible aspiration and she underwent speech language pathology evaluation that did not show any evidence of oropharyngeal or oral dysphagia.    Patient remained hemodynamically stable and is being discharged to group home.        Patient /Hospital Summary (Details -- Problem Oriented) :          Complicated UTI (urinary tract infection)   Assessment & Plan    - H/O recurrent UTI  - presented with sepsis which is now resolved  - Urine culture - E.coli - sensitive to all antibiotics tested  - D/C on 10 days of bactrim.        Sepsis secondary to complicated UTI(HCC)   Assessment & Plan    - Pt presented with fever, poor appetite, generalized weakness and dyuria  --acute sepsis with SIRS 4/4 secondary to complicated UTI.  -s/p IV Ceftriaxone.  -Discharge on 10 days of bactrim.          Hypokalemia   Assessment & Plan    -3.3 and repleted before discharge.          H/O brain surgery   Assessment & Plan    - Unknown reason, tumor as per son  - Pt on Keppra        HTN (hypertension)   Assessment & Plan    - continue home medications        HLD (hyperlipidemia)   Assessment & Plan    - Continue home medications        History of stroke   Assessment & Plan    - no acute symptoms  - continue home medications            Consultants:     None    Procedures:        None    Imaging/ Testing:      DX-CHEST-PORTABLE (1 VIEW)   Final Result      1.  Lungs remain hypoinflated with bibasilar atelectasis.   2.  There is no evidence of pulmonary edema.      DX-CHEST-PORTABLE (1 VIEW)   Final Result          1. Low lung volume with hypoventilatory change and bibasilar atelectasis.            Discharge Medications:         Medication Reconciliation: Completed       Medication List      START taking these medications      Instructions   sulfamethoxazole-trimethoprim 800-160 MG tablet  Commonly known as:  BACTRIM DS   Take 1 Tab by mouth 2 times a day.  Dose:  1 Tab        CONTINUE taking these medications      Instructions   alendronate 70 MG Tabs  Commonly known as:  FOSAMAX   Take 70 mg by mouth every 7 days. On Wed  Dose:  70 mg     atenolol 50 MG Tabs  Commonly known as:  TENORMIN   Take 50 mg by mouth every day.  Dose:  50 mg     baclofen 10 MG Tabs  Commonly known as:  LIORESAL   Take 10 mg by mouth 3 times a day.  Dose:  10 mg     clopidogrel 75 MG Tabs  Commonly known as:  PLAVIX   Take 75 mg by mouth every day.  Dose:  75 mg     gabapentin 300 MG Caps  Commonly known as:  NEURONTIN   Take 300 mg by mouth every bedtime.  Dose:  300 mg     levETIRAcetam 500 MG Tabs  Commonly known as:  KEPPRA   Take 500 mg by mouth 2 times a day.  Dose:  500 mg     melatonin 3 MG Tabs   Take 3 mg by mouth every bedtime.  Dose:  3 mg     nystatin powder  Commonly known as:  MYCOSTATIN   Apply 1 g to affected area(s) 2 Times a Day. Per mar apply to affected area  Dose:  1 g     PRAVACHOL 40 MG tablet  Generic drug:  pravastatin   Take 40 mg by mouth every evening.  Dose:  40 mg     SENEXON-S 8.6-50 MG Tabs  Generic drug:  senna-docusate   Take 1 Tab by mouth every day.  Dose:  1 Tab     vitamin D (Ergocalciferol) 30825 units Caps capsule  Commonly known as:  DRISDOL   Take 50,000 Units by mouth every 7 days. On Wed  Dose:  83052 Units                Disposition:   At group home    Diet:   Regular Diet    Activity:   As tolerated    Instructions:      Drink plenty of water.  The patient was instructed to return to the ER in the event of worsening symptoms. I have counseled the patient on the importance of compliance and the patient  has agreed to proceed with all medical recommendations and follow up plan indicated above.   The patient understands that all medications come with benefits and risks. Risks may include permanent injury or death and these risks can be minimized with close reassessment and monitoring.        Primary Care Provider:   JOSE ELIAS Becerra    Discharge summary faxed to primary care provider:  Completed  Copy of discharge summary given to the patient: Completed      Follow up appointment details :      None    Pending Studies:        None    Time spent on discharge day patient visit, preparing discharge paperwork and arranging for patient follow up.    Summary of follow up issues:   As stated above.    Discharge Time (Minutes) : 60 mins.  Hospital Course Type:  Inpatient Stay >2 midnights      Condition on Discharge    ______________________________________________________________________    Interval history/exam for day of discharge:     Patient denied any UTI like symptoms.      Most Recent Labs:    Lab Results   Component Value Date/Time    WBC 6.7 08/29/2018 05:15 AM    RBC 3.48 (L) 08/29/2018 05:15 AM    HEMOGLOBIN 11.6 (L) 08/29/2018 05:15 AM    HEMATOCRIT 34.5 (L) 08/29/2018 05:15 AM    MCV 99.1 (H) 08/29/2018 05:15 AM    MCH 33.3 (H) 08/29/2018 05:15 AM    MCHC 33.6 08/29/2018 05:15 AM    MPV 10.8 08/29/2018 05:15 AM    NEUTSPOLYS 67.50 08/29/2018 05:15 AM    LYMPHOCYTES 15.60 (L) 08/29/2018 05:15 AM    MONOCYTES 13.10 08/29/2018 05:15 AM    EOSINOPHILS 0.30 08/29/2018 05:15 AM    BASOPHILS 0.10 08/29/2018 05:15 AM    ANISOCYTOSIS 1+ 08/26/2018 01:00 PM      Lab Results   Component Value Date/Time    SODIUM 143 08/29/2018 05:15 AM    POTASSIUM 3.3 (L) 08/29/2018 05:15 AM    CHLORIDE 111 08/29/2018 05:15 AM    CO2 22 08/29/2018 05:15 AM    GLUCOSE 116 (H) 08/29/2018 05:15 AM    BUN 4 (L) 08/29/2018 05:15 AM    CREATININE 0.41 (L) 08/29/2018 05:15 AM      Lab Results   Component Value Date/Time    ALTSGPT 18  08/29/2018 05:15 AM    ASTSGOT 19 08/29/2018 05:15 AM    ALKPHOSPHAT 64 08/29/2018 05:15 AM    TBILIRUBIN 0.5 08/29/2018 05:15 AM    LIPASE 11 08/10/2017 02:10 AM    ALBUMIN 2.9 (L) 08/29/2018 05:15 AM    GLOBULIN 3.9 (H) 08/29/2018 05:15 AM    INR 1.00 01/14/2018 10:10 AM    MACROCYTOSIS 1+ 08/26/2018 01:00 PM     Lab Results   Component Value Date/Time    PROTHROMBTM 12.9 01/14/2018 10:10 AM    INR 1.00 01/14/2018 10:10 AM

## 2018-08-30 NOTE — PROGRESS NOTES
Internal Medicine Interval Note  Note Author: Jolie Vicente M.D.     Name Malena Milton 1940   Age/Sex 78 y.o. female   MRN [id number]   Code Status Full     After 5PM or if no immediate response to page, please call for cross-coverage  Attending/Team: Dr. Holly/Gary See Patient List for primary contact information  Call [phone] to page    1st Call - Day Intern (R1):   Dr. Stack 2nd Call - Day Sr. Resident (R2/R3):   Dr. Vicente     Ms. Milton, a pleasant 78 year old female presented to the ER from group home with c/o poor appetite, nausea, and dysuria for 2 days, generalized weakness for 1 day. Pt was unable to get up. Pt denies any chills, abdominal pain, vomiting, urinary frequency or back pain. Pt has a history of recurrent UTI, she had 3 episodes in the past 12 months. This is the first time patient is admitted for urinary tract infection.    Overnight, Pt denies new complaints. Swallow evaluation cleared patient and can take regular meals without any difficulty. Repeat CXR did not show features of pneumonia. Urine culture grew E. Coli, sensitive to all abx tested. Will observe for 1 more day as we replace her potassium and discharge tomorrow on oral antibiotics if  stable.    Reason for interval visit  (Principal Problem)   Sepsis, secondary to UTI    Interval Problem Daily Status Update  (24 hours, problem oriented, brief subjective history, new lab/imaging data pertinent to that problem)   -Patient denied any UTI like symptoms.      Review of Systems   Constitutional: Negative for chills and fever.   HENT: Negative for congestion and sore throat.    Respiratory: Negative for cough and shortness of breath.    Cardiovascular: Negative for chest pain, orthopnea and leg swelling.   Gastrointestinal: Negative for abdominal pain, diarrhea, nausea and vomiting.   Genitourinary: Negative for dysuria, flank pain and frequency.   Musculoskeletal: Negative for back pain and joint pain.   Skin:  Negative for rash.   Neurological: Negative for dizziness and headaches.   Psychiatric/Behavioral: Positive for memory loss (chronic short term). The patient is not nervous/anxious.        Disposition/Barriers to discharge:   Group home when medically stable/No barriers to discharge    Consultants/Specialty  PCP: JOSE ELIAS Becerra      Quality Measures  Quality-Core Measures   Reviewed items::  Labs reviewed and Medications reviewed  Tomas catheter::  No Tomas  DVT prophylaxis pharmacological::  Enoxaparin (Lovenox)  Ulcer Prophylaxis::  No  Antibiotics:  Treating active infection/contamination beyond 24 hours perioperative coverage      Physical Exam       Vitals:    08/28/18 1519 08/28/18 1914 08/29/18 0314 08/29/18 0810   BP: 117/66 120/80 133/75 141/88   Pulse: 90 96 92 88   Resp: 16 18 17 18   Temp: 36.7 °C (98 °F) 36.7 °C (98.1 °F) 36.4 °C (97.5 °F) 36.9 °C (98.4 °F)   SpO2: 98% 95% 96% 96%   Weight:       Height:         Body mass index is 24.03 kg/m².    Oxygen Therapy:  Pulse Oximetry: 96 %, O2 (LPM): 0, O2 Delivery: None (Room Air)    Physical Exam   Constitutional: She is oriented to person, place, and time and well-developed, well-nourished, and in no distress.   HENT:   Head: Normocephalic and atraumatic.   Eyes: Pupils are equal, round, and reactive to light. EOM are normal.   Neck: Normal range of motion. Neck supple. No thyromegaly present.   Cardiovascular: Normal rate, regular rhythm and normal heart sounds.    No murmur heard.  Pulmonary/Chest: Effort normal.   Abdominal: Soft. Bowel sounds are normal. She exhibits no distension. There is no tenderness.   Musculoskeletal: Normal range of motion. She exhibits no edema or tenderness.   Lymphadenopathy:     She has no cervical adenopathy.   Neurological: She is alert and oriented to person, place, and time. GCS score is 15.   Skin: Skin is warm and dry. No erythema.   Psychiatric: Mood and affect normal.       Assessment/Plan     Complicated  UTI (urinary tract infection)   Assessment & Plan    - H/O recurrent UTI  - presented with sepsis which is now resolved  - Urine culture - E.coli - sensitive to all antibiotics tested  - D/C on 10 days of bactrim.        Sepsis secondary to complicated UTI(HCC)   Assessment & Plan    - Pt presented with fever, poor appetite, generalized weakness and dyuria  --acute sepsis with SIRS 4/4 secondary to complicated UTI.  -s/p IV Ceftriaxone.  -Discharge on 10 days of bactrim.          Hypokalemia- (present on admission)   Assessment & Plan    -3.3 and repleted before discharge.          H/O brain surgery   Assessment & Plan    - Unknown reason, tumor as per son  - Pt on Keppra        HTN (hypertension)- (present on admission)   Assessment & Plan    - continue home medications        HLD (hyperlipidemia)- (present on admission)   Assessment & Plan    - Continue home medications        History of stroke- (present on admission)   Assessment & Plan    - no acute symptoms  - continue home medications

## 2019-02-26 ENCOUNTER — APPOINTMENT (RX ONLY)
Dept: URBAN - METROPOLITAN AREA CLINIC 4 | Facility: CLINIC | Age: 79
Setting detail: DERMATOLOGY
End: 2019-02-26

## 2019-02-26 DIAGNOSIS — L72.0 EPIDERMAL CYST: ICD-10-CM

## 2019-02-26 PROCEDURE — ? ADDITIONAL NOTES

## 2019-02-26 PROCEDURE — 99212 OFFICE O/P EST SF 10 MIN: CPT

## 2019-02-26 PROCEDURE — ? COUNSELING

## 2019-02-26 ASSESSMENT — LOCATION SIMPLE DESCRIPTION DERM: LOCATION SIMPLE: RIGHT UPPER BACK

## 2019-02-26 ASSESSMENT — LOCATION DETAILED DESCRIPTION DERM: LOCATION DETAILED: RIGHT SUPERIOR UPPER BACK

## 2019-02-26 ASSESSMENT — LOCATION ZONE DERM: LOCATION ZONE: TRUNK

## 2019-02-26 NOTE — PROCEDURE: ADDITIONAL NOTES
Additional Notes: Discussed Excision removal since the cyst becomes recurrently inflamed and gets tender. \\nWill schedule for Excision with Dr. Machuca . (Scheduled in March).
Detail Level: Simple

## 2019-02-26 NOTE — HPI: CYST
How Severe Is Your Cyst?: mild
Is This A New Presentation, Or A Follow-Up?: Cyst
Additional History: Patient’s son stated it has drained a dark material, then fills back up. It gets inflamed and itchy and bothersome to patient.

## 2019-03-13 ENCOUNTER — APPOINTMENT (RX ONLY)
Dept: URBAN - METROPOLITAN AREA CLINIC 4 | Facility: CLINIC | Age: 79
Setting detail: DERMATOLOGY
End: 2019-03-13

## 2019-03-13 DIAGNOSIS — D485 NEOPLASM OF UNCERTAIN BEHAVIOR OF SKIN: ICD-10-CM

## 2019-03-13 PROBLEM — D48.5 NEOPLASM OF UNCERTAIN BEHAVIOR OF SKIN: Status: ACTIVE | Noted: 2019-03-13

## 2019-03-13 PROBLEM — I10 ESSENTIAL (PRIMARY) HYPERTENSION: Status: ACTIVE | Noted: 2019-03-13

## 2019-03-13 PROCEDURE — ? EXCISION

## 2019-03-13 PROCEDURE — ? COUNSELING

## 2019-03-13 PROCEDURE — 11406 EXC TR-EXT B9+MARG >4.0 CM: CPT

## 2019-03-13 PROCEDURE — 13101 CMPLX RPR TRUNK 2.6-7.5 CM: CPT

## 2019-03-13 PROCEDURE — 13102 CMPLX RPR TRUNK ADDL 5CM/<: CPT

## 2019-03-13 ASSESSMENT — LOCATION DETAILED DESCRIPTION DERM: LOCATION DETAILED: RIGHT SUPERIOR UPPER BACK

## 2019-03-13 ASSESSMENT — LOCATION SIMPLE DESCRIPTION DERM: LOCATION SIMPLE: RIGHT UPPER BACK

## 2019-03-13 ASSESSMENT — LOCATION ZONE DERM: LOCATION ZONE: TRUNK

## 2019-03-13 NOTE — PROCEDURE: EXCISION
Double O-Z Plasty Text: The defect edges were debeveled with a #15 scalpel blade.  Given the location of the defect, shape of the defect and the proximity to free margins a Double O-Z plasty (double transposition flap) was deemed most appropriate.  Using a sterile surgical marker, the appropriate transposition flaps were drawn incorporating the defect and placing the expected incisions within the relaxed skin tension lines where possible. The area thus outlined was incised deep to adipose tissue with a #15 scalpel blade.  The skin margins were undermined to an appropriate distance in all directions utilizing iris scissors.  Hemostasis was achieved with electrocautery.  The flaps were then transposed into place, one clockwise and the other counterclockwise, and anchored with interrupted buried subcutaneous sutures.
Dermal Autograft Text: The defect edges were debeveled with a #15 scalpel blade.  Given the location of the defect, shape of the defect and the proximity to free margins a dermal autograft was deemed most appropriate.  Using a sterile surgical marker, the primary defect shape was transferred to the donor site. The area thus outlined was incised deep to adipose tissue with a #15 scalpel blade.  The harvested graft was then trimmed of adipose and epidermal tissue until only dermis was left.  The skin graft was then placed in the primary defect and oriented appropriately.
Validate Date Of Previous Biopsy (Can Hide Date Of Previous Biopsy In Settings Tab): No
Medical Necessity Information: It is in your best interest to select a reason for this procedure from the list below. All of these items fulfill various CMS LCD requirements except lesion extends to a margin.
Surgeon Performing Repair (Optional): Sven
Melolabial Transposition Flap Text: The defect edges were debeveled with a #15 scalpel blade.  Given the location of the defect and the proximity to free margins a melolabial flap was deemed most appropriate.  Using a sterile surgical marker, an appropriate melolabial transposition flap was drawn incorporating the defect.    The area thus outlined was incised deep to adipose tissue with a #15 scalpel blade.  The skin margins were undermined to an appropriate distance in all directions utilizing iris scissors.
Complex Repair And M Plasty Text: The defect edges were debeveled with a #15 scalpel blade.  The primary defect was closed partially with a complex linear closure.  Given the location of the remaining defect, shape of the defect and the proximity to free margins an M plasty was deemed most appropriate for complete closure of the defect.  Using a sterile surgical marker, an appropriate advancement flap was drawn incorporating the defect and placing the expected incisions within the relaxed skin tension lines where possible.    The area thus outlined was incised deep to adipose tissue with a #15 scalpel blade.  The skin margins were undermined to an appropriate distance in all directions utilizing iris scissors.
O-Z Plasty Text: The defect edges were debeveled with a #15 scalpel blade.  Given the location of the defect, shape of the defect and the proximity to free margins an O-Z plasty (double transposition flap) was deemed most appropriate.  Using a sterile surgical marker, the appropriate transposition flaps were drawn incorporating the defect and placing the expected incisions within the relaxed skin tension lines where possible.    The area thus outlined was incised deep to adipose tissue with a #15 scalpel blade.  The skin margins were undermined to an appropriate distance in all directions utilizing iris scissors.  Hemostasis was achieved with electrocautery.  The flaps were then transposed into place, one clockwise and the other counterclockwise, and anchored with interrupted buried subcutaneous sutures.
Home Suture Removal Text: Patient was provided a home suture removal kit and will remove their sutures at home.  If they have any questions or difficulties they will call the office.
Lazy S Complex Repair Preamble Text (Leave Blank If You Do Not Want): Extensive wide undermining was performed.
Epidermal Autograft Text: The defect edges were debeveled with a #15 scalpel blade.  Given the location of the defect, shape of the defect and the proximity to free margins an epidermal autograft was deemed most appropriate.  Using a sterile surgical marker, the primary defect shape was transferred to the donor site. The epidermal graft was then harvested.  The skin graft was then placed in the primary defect and oriented appropriately.
Skin Substitute Text: The defect edges were debeveled with a #15 scalpel blade.  Given the location of the defect, shape of the defect and the proximity to free margins a skin substitute graft was deemed most appropriate.  The graft material was trimmed to fit the size of the defect. The graft was then placed in the primary defect and oriented appropriately.
Show Curettage Variables: Yes
Complex Repair And W Plasty Text: The defect edges were debeveled with a #15 scalpel blade.  The primary defect was closed partially with a complex linear closure.  Given the location of the remaining defect, shape of the defect and the proximity to free margins a W plasty was deemed most appropriate for complete closure of the defect.  Using a sterile surgical marker, an appropriate advancement flap was drawn incorporating the defect and placing the expected incisions within the relaxed skin tension lines where possible.    The area thus outlined was incised deep to adipose tissue with a #15 scalpel blade.  The skin margins were undermined to an appropriate distance in all directions utilizing iris scissors.
Rhomboid Transposition Flap Text: The defect edges were debeveled with a #15 scalpel blade.  Given the location of the defect and the proximity to free margins a rhomboid transposition flap was deemed most appropriate.  Using a sterile surgical marker, an appropriate rhomboid flap was drawn incorporating the defect.    The area thus outlined was incised deep to adipose tissue with a #15 scalpel blade.  The skin margins were undermined to an appropriate distance in all directions utilizing iris scissors.
Skin Substitute Units (Will Override Primary Defect Units If Greater Than 0): 0
Rhombic Flap Text: The defect edges were debeveled with a #15 scalpel blade.  Given the location of the defect and the proximity to free margins a rhombic flap was deemed most appropriate.  Using a sterile surgical marker, an appropriate rhombic flap was drawn incorporating the defect.    The area thus outlined was incised deep to adipose tissue with a #15 scalpel blade.  The skin margins were undermined to an appropriate distance in all directions utilizing iris scissors.
Complex Repair And Double M Plasty Text: The defect edges were debeveled with a #15 scalpel blade.  The primary defect was closed partially with a complex linear closure.  Given the location of the remaining defect, shape of the defect and the proximity to free margins a double M plasty was deemed most appropriate for complete closure of the defect.  Using a sterile surgical marker, an appropriate advancement flap was drawn incorporating the defect and placing the expected incisions within the relaxed skin tension lines where possible.    The area thus outlined was incised deep to adipose tissue with a #15 scalpel blade.  The skin margins were undermined to an appropriate distance in all directions utilizing iris scissors.
Scalpel Size: 15 blade
Bi-Rhombic Flap Text: The defect edges were debeveled with a #15 scalpel blade.  Given the location of the defect and the proximity to free margins a bi-rhombic flap was deemed most appropriate.  Using a sterile surgical marker, an appropriate rhombic flap was drawn incorporating the defect. The area thus outlined was incised deep to adipose tissue with a #15 scalpel blade.  The skin margins were undermined to an appropriate distance in all directions utilizing iris scissors.
V-Y Plasty Text: The defect edges were debeveled with a #15 scalpel blade.  Given the location of the defect, shape of the defect and the proximity to free margins an V-Y advancement flap was deemed most appropriate.  Using a sterile surgical marker, an appropriate advancement flap was drawn incorporating the defect and placing the expected incisions within the relaxed skin tension lines where possible.    The area thus outlined was incised deep to adipose tissue with a #15 scalpel blade.  The skin margins were undermined to an appropriate distance in all directions utilizing iris scissors.
Tissue Cultured Epidermal Autograft Text: The defect edges were debeveled with a #15 scalpel blade.  Given the location of the defect, shape of the defect and the proximity to free margins a tissue cultured epidermal autograft was deemed most appropriate.  The graft was then trimmed to fit the size of the defect.  The graft was then placed in the primary defect and oriented appropriately.
Crescentic Intermediate Repair Preamble Text (Leave Blank If You Do Not Want): Undermining was performed with blunt dissection.
Complex Repair And Z Plasty Text: The defect edges were debeveled with a #15 scalpel blade.  The primary defect was closed partially with a complex linear closure.  Given the location of the remaining defect, shape of the defect and the proximity to free margins a Z plasty was deemed most appropriate for complete closure of the defect.  Using a sterile surgical marker, an appropriate advancement flap was drawn incorporating the defect and placing the expected incisions within the relaxed skin tension lines where possible.    The area thus outlined was incised deep to adipose tissue with a #15 scalpel blade.  The skin margins were undermined to an appropriate distance in all directions utilizing iris scissors.
S Plasty Text: Given the location and shape of the defect, and the orientation of relaxed skin tension lines, an S-plasty was deemed most appropriate for repair.  Using a sterile surgical marker, the appropriate outline of the S-plasty was drawn, incorporating the defect and placing the expected incisions within the relaxed skin tension lines where possible.  The area thus outlined was incised deep to adipose tissue with a #15 scalpel blade.  The skin margins were undermined to an appropriate distance in all directions utilizing iris scissors. The skin flaps were advanced over the defect.  The opposing margins were then approximated with interrupted buried subcutaneous sutures.
Helical Rim Advancement Flap Text: The defect edges were debeveled with a #15 blade scalpel.  Given the location of the defect and the proximity to free margins (helical rim) a double helical rim advancement flap was deemed most appropriate.  Using a sterile surgical marker, the appropriate advancement flaps were drawn incorporating the defect and placing the expected incisions between the helical rim and antihelix where possible.  The area thus outlined was incised through and through with a #15 scalpel blade.  With a skin hook and iris scissors, the flaps were gently and sharply undermined and freed up.
Complex Repair And Dorsal Nasal Flap Text: The defect edges were debeveled with a #15 scalpel blade.  The primary defect was closed partially with a complex linear closure.  Given the location of the remaining defect, shape of the defect and the proximity to free margins a dorsal nasal flap was deemed most appropriate for complete closure of the defect.  Using a sterile surgical marker, an appropriate flap was drawn incorporating the defect and placing the expected incisions within the relaxed skin tension lines where possible.    The area thus outlined was incised deep to adipose tissue with a #15 scalpel blade.  The skin margins were undermined to an appropriate distance in all directions utilizing iris scissors.
H Plasty Text: Given the location of the defect, shape of the defect and the proximity to free margins a H-plasty was deemed most appropriate for repair.  Using a sterile surgical marker, the appropriate advancement arms of the H-plasty were drawn incorporating the defect and placing the expected incisions within the relaxed skin tension lines where possible. The area thus outlined was incised deep to adipose tissue with a #15 scalpel blade. The skin margins were undermined to an appropriate distance in all directions utilizing iris scissors.  The opposing advancement arms were then advanced into place in opposite direction and anchored with interrupted buried subcutaneous sutures.
Repair Anesthesia Method: local infiltration
Xenograft Text: The defect edges were debeveled with a #15 scalpel blade.  Given the location of the defect, shape of the defect and the proximity to free margins a xenograft was deemed most appropriate.  The graft was then trimmed to fit the size of the defect.  The graft was then placed in the primary defect and oriented appropriately.
Size Of Lesion In Cm: 4.1
Size Of Margin In Cm: 0.2
Anesthesia Type: 1% lidocaine with 1:100,000 epinephrine and 408mcg clindamycin/ml and a 1:10 solution of 8.4% sodium bicarbonate
W Plasty Text: The lesion was extirpated to the level of the fat with a #15 scalpel blade.  Given the location of the defect, shape of the defect and the proximity to free margins a W-plasty was deemed most appropriate for repair.  Using a sterile surgical marker, the appropriate transposition arms of the W-plasty were drawn incorporating the defect and placing the expected incisions within the relaxed skin tension lines where possible.    The area thus outlined was incised deep to adipose tissue with a #15 scalpel blade.  The skin margins were undermined to an appropriate distance in all directions utilizing iris scissors.  The opposing transposition arms were then transposed into place in opposite direction and anchored with interrupted buried subcutaneous sutures.
Purse String (Intermediate) Text: Given the location of the defect and the characteristics of the surrounding skin a purse string intermediate closure was deemed most appropriate.  Undermining was performed circumferentially around the surgical defect.  A purse string suture was then placed and tightened.
Bilateral Helical Rim Advancement Flap Text: The defect edges were debeveled with a #15 blade scalpel.  Given the location of the defect and the proximity to free margins (helical rim) a bilateral helical rim advancement flap was deemed most appropriate.  Using a sterile surgical marker, the appropriate advancement flaps were drawn incorporating the defect and placing the expected incisions between the helical rim and antihelix where possible.  The area thus outlined was incised through and through with a #15 scalpel blade.  With a skin hook and iris scissors, the flaps were gently and sharply undermined and freed up.
Complex Repair And Ftsg Text: The defect edges were debeveled with a #15 scalpel blade.  The primary defect was closed partially with a complex linear closure.  Given the location of the defect, shape of the defect and the proximity to free margins a full thickness skin graft was deemed most appropriate to repair the remaining defect.  The graft was trimmed to fit the size of the remaining defect.  The graft was then placed in the primary defect, oriented appropriately, and sutured into place.
Epidermal Closure: running locked
Crescentic Advancement Flap Text: The defect edges were debeveled with a #15 scalpel blade.  Given the location of the defect and the proximity to free margins a crescentic advancement flap was deemed most appropriate.  Using a sterile surgical marker, the appropriate advancement flap was drawn incorporating the defect and placing the expected incisions within the relaxed skin tension lines where possible.    The area thus outlined was incised deep to adipose tissue with a #15 scalpel blade.  The skin margins were undermined to an appropriate distance in all directions utilizing iris scissors.
Complex Repair And Single Advancement Flap Text: The defect edges were debeveled with a #15 scalpel blade.  The primary defect was closed partially with a complex linear closure.  Given the location of the remaining defect, shape of the defect and the proximity to free margins a single advancement flap was deemed most appropriate for complete closure of the defect.  Using a sterile surgical marker, an appropriate advancement flap was drawn incorporating the defect and placing the expected incisions within the relaxed skin tension lines where possible.    The area thus outlined was incised deep to adipose tissue with a #15 scalpel blade.  The skin margins were undermined to an appropriate distance in all directions utilizing iris scissors.
A-T Advancement Flap Text: The defect edges were debeveled with a #15 scalpel blade.  Given the location of the defect, shape of the defect and the proximity to free margins an A-T advancement flap was deemed most appropriate.  Using a sterile surgical marker, an appropriate advancement flap was drawn incorporating the defect and placing the expected incisions within the relaxed skin tension lines where possible.    The area thus outlined was incised deep to adipose tissue with a #15 scalpel blade.  The skin margins were undermined to an appropriate distance in all directions utilizing iris scissors.
Information: Selecting Yes will display possible errors in your note based on the variables you have selected. This validation is only offered as a suggestion for you. PLEASE NOTE THAT THE VALIDATION TEXT WILL BE REMOVED WHEN YOU FINALIZE YOUR NOTE. IF YOU WANT TO FAX A PRELIMINARY NOTE YOU WILL NEED TO TOGGLE THIS TO 'NO' IF YOU DO NOT WANT IT IN YOUR FAXED NOTE.
Intermediate / Complex Repair - Final Wound Length In Cm: 7.6
Z Plasty Text: The lesion was extirpated to the level of the fat with a #15 scalpel blade.  Given the location of the defect, shape of the defect and the proximity to free margins a Z-plasty was deemed most appropriate for repair.  Using a sterile surgical marker, the appropriate transposition arms of the Z-plasty were drawn incorporating the defect and placing the expected incisions within the relaxed skin tension lines where possible.    The area thus outlined was incised deep to adipose tissue with a #15 scalpel blade.  The skin margins were undermined to an appropriate distance in all directions utilizing iris scissors.  The opposing transposition arms were then transposed into place in opposite direction and anchored with interrupted buried subcutaneous sutures.
Purse String (Simple) Text: Given the location of the defect and the characteristics of the surrounding skin a purse string simple closure was deemed most appropriate.  Undermining was performed circumferentially around the surgical defect.  A purse string suture was then placed and tightened.
Complex Repair And Split-Thickness Skin Graft Text: The defect edges were debeveled with a #15 scalpel blade.  The primary defect was closed partially with a complex linear closure.  Given the location of the defect, shape of the defect and the proximity to free margins a split thickness skin graft was deemed most appropriate to repair the remaining defect.  The graft was trimmed to fit the size of the remaining defect.  The graft was then placed in the primary defect, oriented appropriately, and sutured into place.
Ear Star Wedge Flap Text: The defect edges were debeveled with a #15 blade scalpel.  Given the location of the defect and the proximity to free margins (helical rim) an ear star wedge flap was deemed most appropriate.  Using a sterile surgical marker, the appropriate flap was drawn incorporating the defect and placing the expected incisions between the helical rim and antihelix where possible.  The area thus outlined was incised through and through with a #15 scalpel blade.
Complex Repair And Double Advancement Flap Text: The defect edges were debeveled with a #15 scalpel blade.  The primary defect was closed partially with a complex linear closure.  Given the location of the remaining defect, shape of the defect and the proximity to free margins a double advancement flap was deemed most appropriate for complete closure of the defect.  Using a sterile surgical marker, an appropriate advancement flap was drawn incorporating the defect and placing the expected incisions within the relaxed skin tension lines where possible.    The area thus outlined was incised deep to adipose tissue with a #15 scalpel blade.  The skin margins were undermined to an appropriate distance in all directions utilizing iris scissors.
Bilobed Flap Text: The defect edges were debeveled with a #15 scalpel blade.  Given the location of the defect and the proximity to free margins a bilobe flap was deemed most appropriate.  Using a sterile surgical marker, an appropriate bilobe flap drawn around the defect.    The area thus outlined was incised deep to adipose tissue with a #15 scalpel blade.  The skin margins were undermined to an appropriate distance in all directions utilizing iris scissors.
Complex Repair And Dermal Autograft Text: The defect edges were debeveled with a #15 scalpel blade.  The primary defect was closed partially with a complex linear closure.  Given the location of the defect, shape of the defect and the proximity to free margins an dermal autograft was deemed most appropriate to repair the remaining defect.  The graft was trimmed to fit the size of the remaining defect.  The graft was then placed in the primary defect, oriented appropriately, and sutured into place.
Fusiform Excision Additional Text (Leave Blank If You Do Not Want): The margin was drawn around the clinically apparent lesion.  A fusiform shape was then drawn on the skin incorporating the lesion and margins.  Incisions were then made along these lines to the appropriate tissue plane and the lesion was extirpated.
Excision Method: Elliptical
Complex Repair And Epidermal Autograft Text: The defect edges were debeveled with a #15 scalpel blade.  The primary defect was closed partially with a complex linear closure.  Given the location of the defect, shape of the defect and the proximity to free margins an epidermal autograft was deemed most appropriate to repair the remaining defect.  The graft was trimmed to fit the size of the remaining defect.  The graft was then placed in the primary defect, oriented appropriately, and sutured into place.
Banner Transposition Flap Text: The defect edges were debeveled with a #15 scalpel blade.  Given the location of the defect and the proximity to free margins a Banner transposition flap was deemed most appropriate.  Using a sterile surgical marker, an appropriate flap drawn around the defect. The area thus outlined was incised deep to adipose tissue with a #15 scalpel blade.  The skin margins were undermined to an appropriate distance in all directions utilizing iris scissors.
Anesthesia Volume In Cc: 12.6
O-T Advancement Flap Text: The defect edges were debeveled with a #15 scalpel blade.  Given the location of the defect, shape of the defect and the proximity to free margins an O-T advancement flap was deemed most appropriate.  Using a sterile surgical marker, an appropriate advancement flap was drawn incorporating the defect and placing the expected incisions within the relaxed skin tension lines where possible.    The area thus outlined was incised deep to adipose tissue with a #15 scalpel blade.  The skin margins were undermined to an appropriate distance in all directions utilizing iris scissors.
Cheek Interpolation Flap Text: A decision was made to reconstruct the defect utilizing an interpolation axial flap and a staged reconstruction.  A telfa template was made of the defect.  This telfa template was then used to outline the Cheek Interpolation flap.  The donor area for the pedicle flap was then injected with anesthesia.  The flap was excised through the skin and subcutaneous tissue down to the layer of the underlying musculature.  The interpolation flap was carefully excised within this deep plane to maintain its blood supply.  The edges of the donor site were undermined.   The donor site was closed in a primary fashion.  The pedicle was then rotated into position and sutured.  Once the tube was sutured into place, adequate blood supply was confirmed with blanching and refill.  The pedicle was then wrapped with xeroform gauze and dressed appropriately with a telfa and gauze bandage to ensure continued blood supply and protect the attached pedicle.
Interpolation Flap Text: A decision was made to reconstruct the defect utilizing an interpolation axial flap and a staged reconstruction.  A telfa template was made of the defect.  This telfa template was then used to outline the interpolation flap.  The donor area for the pedicle flap was then injected with anesthesia.  The flap was excised through the skin and subcutaneous tissue down to the layer of the underlying musculature.  The interpolation flap was carefully excised within this deep plane to maintain its blood supply.  The edges of the donor site were undermined.   The donor site was closed in a primary fashion.  The pedicle was then rotated into position and sutured.  Once the tube was sutured into place, adequate blood supply was confirmed with blanching and refill.  The pedicle was then wrapped with xeroform gauze and dressed appropriately with a telfa and gauze bandage to ensure continued blood supply and protect the attached pedicle.
V-Y Flap Text: The defect edges were debeveled with a #15 scalpel blade.  Given the location of the defect, shape of the defect and the proximity to free margins a V-Y flap was deemed most appropriate.  Using a sterile surgical marker, an appropriate advancement flap was drawn incorporating the defect and placing the expected incisions within the relaxed skin tension lines where possible.    The area thus outlined was incised deep to adipose tissue with a #15 scalpel blade.  The skin margins were undermined to an appropriate distance in all directions utilizing iris scissors.
Complex Repair And Modified Advancement Flap Text: The defect edges were debeveled with a #15 scalpel blade.  The primary defect was closed partially with a complex linear closure.  Given the location of the remaining defect, shape of the defect and the proximity to free margins a modified advancement flap was deemed most appropriate for complete closure of the defect.  Using a sterile surgical marker, an appropriate advancement flap was drawn incorporating the defect and placing the expected incisions within the relaxed skin tension lines where possible.    The area thus outlined was incised deep to adipose tissue with a #15 scalpel blade.  The skin margins were undermined to an appropriate distance in all directions utilizing iris scissors.
Saucerization Excision Additional Text (Leave Blank If You Do Not Want): The margin was drawn around the clinically apparent lesion.  Incisions were then made along these lines, in a tangential fashion, to the appropriate tissue plane and the lesion was extirpated.
Bilobed Transposition Flap Text: The defect edges were debeveled with a #15 scalpel blade.  Given the location of the defect and the proximity to free margins a bilobed transposition flap was deemed most appropriate.  Using a sterile surgical marker, an appropriate bilobe flap drawn around the defect.    The area thus outlined was incised deep to adipose tissue with a #15 scalpel blade.  The skin margins were undermined to an appropriate distance in all directions utilizing iris scissors.
Complex Repair And Tissue Cultured Epidermal Autograft Text: The defect edges were debeveled with a #15 scalpel blade.  The primary defect was closed partially with a complex linear closure.  Given the location of the defect, shape of the defect and the proximity to free margins an tissue cultured epidermal autograft was deemed most appropriate to repair the remaining defect.  The graft was trimmed to fit the size of the remaining defect.  The graft was then placed in the primary defect, oriented appropriately, and sutured into place.
O-L Flap Text: The defect edges were debeveled with a #15 scalpel blade.  Given the location of the defect, shape of the defect and the proximity to free margins an O-L flap was deemed most appropriate.  Using a sterile surgical marker, an appropriate advancement flap was drawn incorporating the defect and placing the expected incisions within the relaxed skin tension lines where possible.    The area thus outlined was incised deep to adipose tissue with a #15 scalpel blade.  The skin margins were undermined to an appropriate distance in all directions utilizing iris scissors.
Billing Type: Third-Party Bill
Eliptical Excision Additional Text (Leave Blank If You Do Not Want): The margin was drawn around the clinically apparent lesion.  An elliptical shape was then drawn on the skin incorporating the lesion and margins.  Incisions were then made along these lines to the appropriate tissue plane and the lesion was extirpated.
Cheek-To-Nose Interpolation Flap Text: A decision was made to reconstruct the defect utilizing an interpolation axial flap and a staged reconstruction.  A telfa template was made of the defect.  This telfa template was then used to outline the Cheek-To-Nose Interpolation flap.  The donor area for the pedicle flap was then injected with anesthesia.  The flap was excised through the skin and subcutaneous tissue down to the layer of the underlying musculature.  The interpolation flap was carefully excised within this deep plane to maintain its blood supply.  The edges of the donor site were undermined.   The donor site was closed in a primary fashion.  The pedicle was then rotated into position and sutured.  Once the tube was sutured into place, adequate blood supply was confirmed with blanching and refill.  The pedicle was then wrapped with xeroform gauze and dressed appropriately with a telfa and gauze bandage to ensure continued blood supply and protect the attached pedicle.
Lab: [id number]
Slit Excision Additional Text (Leave Blank If You Do Not Want): A linear line was drawn on the skin overlying the lesion. An incision was made slowly until the lesion was visualized.  Once visualized, the lesion was removed with blunt dissection.
Complex Repair And Xenograft Text: The defect edges were debeveled with a #15 scalpel blade.  The primary defect was closed partially with a complex linear closure.  Given the location of the defect, shape of the defect and the proximity to free margins a xenograft was deemed most appropriate to repair the remaining defect.  The graft was trimmed to fit the size of the remaining defect.  The graft was then placed in the primary defect, oriented appropriately, and sutured into place.
Trilobed Flap Text: The defect edges were debeveled with a #15 scalpel blade.  Given the location of the defect and the proximity to free margins a trilobed flap was deemed most appropriate.  Using a sterile surgical marker, an appropriate trilobed flap drawn around the defect.    The area thus outlined was incised deep to adipose tissue with a #15 scalpel blade.  The skin margins were undermined to an appropriate distance in all directions utilizing iris scissors.
Deep Sutures: 2-0 Vicryl
Melolabial Interpolation Flap Text: A decision was made to reconstruct the defect utilizing an interpolation axial flap and a staged reconstruction.  A telfa template was made of the defect.  This telfa template was then used to outline the melolabial interpolation flap.  The donor area for the pedicle flap was then injected with anesthesia.  The flap was excised through the skin and subcutaneous tissue down to the layer of the underlying musculature.  The pedicle flap was carefully excised within this deep plane to maintain its blood supply.  The edges of the donor site were undermined.   The donor site was closed in a primary fashion.  The pedicle was then rotated into position and sutured.  Once the tube was sutured into place, adequate blood supply was confirmed with blanching and refill.  The pedicle was then wrapped with xeroform gauze and dressed appropriately with a telfa and gauze bandage to ensure continued blood supply and protect the attached pedicle.
Mercedes Flap Text: The defect edges were debeveled with a #15 scalpel blade.  Given the location of the defect, shape of the defect and the proximity to free margins a Mercedes flap was deemed most appropriate.  Using a sterile surgical marker, an appropriate advancement flap was drawn incorporating the defect and placing the expected incisions within the relaxed skin tension lines where possible. The area thus outlined was incised deep to adipose tissue with a #15 scalpel blade.  The skin margins were undermined to an appropriate distance in all directions utilizing iris scissors.
Wound Care: Bacitracin
Complex Repair And A-T Advancement Flap Text: The defect edges were debeveled with a #15 scalpel blade.  The primary defect was closed partially with a complex linear closure.  Given the location of the remaining defect, shape of the defect and the proximity to free margins an A-T advancement flap was deemed most appropriate for complete closure of the defect.  Using a sterile surgical marker, an appropriate advancement flap was drawn incorporating the defect and placing the expected incisions within the relaxed skin tension lines where possible.    The area thus outlined was incised deep to adipose tissue with a #15 scalpel blade.  The skin margins were undermined to an appropriate distance in all directions utilizing iris scissors.
Medical Necessity Clause: This procedure was medically necessary because the lesion that was treated was:
Repair Type: Complex
Modified Advancement Flap Text: The defect edges were debeveled with a #15 scalpel blade.  Given the location of the defect, shape of the defect and the proximity to free margins a modified advancement flap was deemed most appropriate.  Using a sterile surgical marker, an appropriate advancement flap was drawn incorporating the defect and placing the expected incisions within the relaxed skin tension lines where possible.    The area thus outlined was incised deep to adipose tissue with a #15 scalpel blade.  The skin margins were undermined to an appropriate distance in all directions utilizing iris scissors.
Excisional Biopsy Additional Text (Leave Blank If You Do Not Want): The margin was drawn around the clinically apparent lesion. An elliptical shape was then drawn on the skin incorporating the lesion and margins.  Incisions were then made along these lines to the appropriate tissue plane and the lesion was extirpated.
Path Notes (To The Dermatopathologist): Please check margins.
Mastoid Interpolation Flap Text: A decision was made to reconstruct the defect utilizing an interpolation axial flap and a staged reconstruction.  A telfa template was made of the defect.  This telfa template was then used to outline the mastoid interpolation flap.  The donor area for the pedicle flap was then injected with anesthesia.  The flap was excised through the skin and subcutaneous tissue down to the layer of the underlying musculature.  The pedicle flap was carefully excised within this deep plane to maintain its blood supply.  The edges of the donor site were undermined.   The donor site was closed in a primary fashion.  The pedicle was then rotated into position and sutured.  Once the tube was sutured into place, adequate blood supply was confirmed with blanching and refill.  The pedicle was then wrapped with xeroform gauze and dressed appropriately with a telfa and gauze bandage to ensure continued blood supply and protect the attached pedicle.
Additional Anesthesia Volume In Cc: 6
Lab Facility: [id number]
Dressing: steri-strips
Complex Repair And Skin Substitute Graft Text: The defect edges were debeveled with a #15 scalpel blade.  The primary defect was closed partially with a complex linear closure.  Given the location of the remaining defect, shape of the defect and the proximity to free margins a skin substitute graft was deemed most appropriate to repair the remaining defect.  The graft was trimmed to fit the size of the remaining defect.  The graft was then placed in the primary defect, oriented appropriately, and sutured into place.
Dorsal Nasal Flap Text: The defect edges were debeveled with a #15 scalpel blade.  Given the location of the defect and the proximity to free margins a dorsal nasal flap was deemed most appropriate.  Using a sterile surgical marker, an appropriate dorsal nasal flap was drawn around the defect.    The area thus outlined was incised deep to adipose tissue with a #15 scalpel blade.  The skin margins were undermined to an appropriate distance in all directions utilizing iris scissors.
Complex Repair And O-T Advancement Flap Text: The defect edges were debeveled with a #15 scalpel blade.  The primary defect was closed partially with a complex linear closure.  Given the location of the remaining defect, shape of the defect and the proximity to free margins an O-T advancement flap was deemed most appropriate for complete closure of the defect.  Using a sterile surgical marker, an appropriate advancement flap was drawn incorporating the defect and placing the expected incisions within the relaxed skin tension lines where possible.    The area thus outlined was incised deep to adipose tissue with a #15 scalpel blade.  The skin margins were undermined to an appropriate distance in all directions utilizing iris scissors.
Complex Repair And Bilobe Flap Text: The defect edges were debeveled with a #15 scalpel blade.  The primary defect was closed partially with a complex linear closure.  Given the location of the remaining defect, shape of the defect and the proximity to free margins a bilobe flap was deemed most appropriate for complete closure of the defect.  Using a sterile surgical marker, an appropriate advancement flap was drawn incorporating the defect and placing the expected incisions within the relaxed skin tension lines where possible.    The area thus outlined was incised deep to adipose tissue with a #15 scalpel blade.  The skin margins were undermined to an appropriate distance in all directions utilizing iris scissors.
Posterior Auricular Interpolation Flap Text: A decision was made to reconstruct the defect utilizing an interpolation axial flap and a staged reconstruction.  A telfa template was made of the defect.  This telfa template was then used to outline the posterior auricular interpolation flap.  The donor area for the pedicle flap was then injected with anesthesia.  The flap was excised through the skin and subcutaneous tissue down to the layer of the underlying musculature.  The pedicle flap was carefully excised within this deep plane to maintain its blood supply.  The edges of the donor site were undermined.   The donor site was closed in a primary fashion.  The pedicle was then rotated into position and sutured.  Once the tube was sutured into place, adequate blood supply was confirmed with blanching and refill.  The pedicle was then wrapped with xeroform gauze and dressed appropriately with a telfa and gauze bandage to ensure continued blood supply and protect the attached pedicle.
Mucosal Advancement Flap Text: Given the location of the defect, shape of the defect and the proximity to free margins a mucosal advancement flap was deemed most appropriate. Incisions were made with a 15 blade scalpel in the appropriate fashion along the cutaneous vermillion border and the mucosal lip. The remaining actinically damaged mucosal tissue was excised.  The mucosal advancement flap was then elevated to the gingival sulcus with care taken to preserve the neurovascular structures and advanced into the primary defect. Care was taken to ensure that precise realignment of the vermilion border was achieved.
Complex Repair And O-L Flap Text: The defect edges were debeveled with a #15 scalpel blade.  The primary defect was closed partially with a complex linear closure.  Given the location of the remaining defect, shape of the defect and the proximity to free margins an O-L flap was deemed most appropriate for complete closure of the defect.  Using a sterile surgical marker, an appropriate flap was drawn incorporating the defect and placing the expected incisions within the relaxed skin tension lines where possible.    The area thus outlined was incised deep to adipose tissue with a #15 scalpel blade.  The skin margins were undermined to an appropriate distance in all directions utilizing iris scissors.
Perilesional Excision Additional Text (Leave Blank If You Do Not Want): The margin was drawn around the clinically apparent lesion. Incisions were then made along these lines to the appropriate tissue plane and the lesion was extirpated.
Island Pedicle Flap Text: The defect edges were debeveled with a #15 scalpel blade.  Given the location of the defect, shape of the defect and the proximity to free margins an island pedicle advancement flap was deemed most appropriate.  Using a sterile surgical marker, an appropriate advancement flap was drawn incorporating the defect, outlining the appropriate donor tissue and placing the expected incisions within the relaxed skin tension lines where possible.    The area thus outlined was incised deep to adipose tissue with a #15 scalpel blade.  The skin margins were undermined to an appropriate distance in all directions around the primary defect and laterally outward around the island pedicle utilizing iris scissors.  There was minimal undermining beneath the pedicle flap.
Alar Island Pedicle Flap Text: The defect edges were debeveled with a #15 scalpel blade.  Given the location of the defect, shape of the defect and the proximity to the alar rim an island pedicle advancement flap was deemed most appropriate.  Using a sterile surgical marker, an appropriate advancement flap was drawn incorporating the defect, outlining the appropriate donor tissue and placing the expected incisions within the nasal ala running parallel to the alar rim. The area thus outlined was incised with a #15 scalpel blade.  The skin margins were undermined minimally to an appropriate distance in all directions around the primary defect and laterally outward around the island pedicle utilizing iris scissors.  There was minimal undermining beneath the pedicle flap.
Lip Wedge Excision Repair Text: Given the location of the defect and the proximity to free margins a full thickness wedge repair was deemed most appropriate.  Using a sterile surgical marker, the appropriate repair was drawn incorporating the defect and placing the expected incisions perpendicular to the vermilion border.  The vermilion border was also meticulously outlined to ensure appropriate reapproximation during the repair.  The area thus outlined was incised through and through with a #15 scalpel blade.  The muscularis and dermis were reaproximated with deep sutures following hemostasis. Care was taken to realign the vermilion border before proceeding with the superficial closure.  Once the vermilion was realigned the superfical and mucosal closure was finished.
Complex Repair And Melolabial Flap Text: The defect edges were debeveled with a #15 scalpel blade.  The primary defect was closed partially with a complex linear closure.  Given the location of the remaining defect, shape of the defect and the proximity to free margins a melolabial flap was deemed most appropriate for complete closure of the defect.  Using a sterile surgical marker, an appropriate advancement flap was drawn incorporating the defect and placing the expected incisions within the relaxed skin tension lines where possible.    The area thus outlined was incised deep to adipose tissue with a #15 scalpel blade.  The skin margins were undermined to an appropriate distance in all directions utilizing iris scissors.
Island Pedicle Flap With Canthal Suspension Text: The defect edges were debeveled with a #15 scalpel blade.  Given the location of the defect, shape of the defect and the proximity to free margins an island pedicle advancement flap was deemed most appropriate.  Using a sterile surgical marker, an appropriate advancement flap was drawn incorporating the defect, outlining the appropriate donor tissue and placing the expected incisions within the relaxed skin tension lines where possible. The area thus outlined was incised deep to adipose tissue with a #15 scalpel blade.  The skin margins were undermined to an appropriate distance in all directions around the primary defect and laterally outward around the island pedicle utilizing iris scissors.  There was minimal undermining beneath the pedicle flap. A suspension suture was placed in the canthal tendon to prevent tension and prevent ectropion.
Epidermal Closure Graft Donor Site (Optional): simple interrupted
Paramedian Forehead Flap Text: A decision was made to reconstruct the defect utilizing an interpolation axial flap and a staged reconstruction.  A telfa template was made of the defect.  This telfa template was then used to outline the paramedian forehead pedicle flap.  The donor area for the pedicle flap was then injected with anesthesia.  The flap was excised through the skin and subcutaneous tissue down to the layer of the underlying musculature.  The pedicle flap was carefully excised within this deep plane to maintain its blood supply.  The edges of the donor site were undermined.   The donor site was closed in a primary fashion.  The pedicle was then rotated into position and sutured.  Once the tube was sutured into place, adequate blood supply was confirmed with blanching and refill.  The pedicle was then wrapped with xeroform gauze and dressed appropriately with a telfa and gauze bandage to ensure continued blood supply and protect the attached pedicle.
Hatchet Flap Text: The defect edges were debeveled with a #15 scalpel blade.  Given the location of the defect, shape of the defect and the proximity to free margins a hatchet flap was deemed most appropriate.  Using a sterile surgical marker, an appropriate hatchet flap was drawn incorporating the defect and placing the expected incisions within the relaxed skin tension lines where possible.    The area thus outlined was incised deep to adipose tissue with a #15 scalpel blade.  The skin margins were undermined to an appropriate distance in all directions utilizing iris scissors.
Ftsg Text: The defect edges were debeveled with a #15 scalpel blade.  Given the location of the defect, shape of the defect and the proximity to free margins a full thickness skin graft was deemed most appropriate.  Using a sterile surgical marker, the primary defect shape was transferred to the donor site. The area thus outlined was incised deep to adipose tissue with a #15 scalpel blade.  The harvested graft was then trimmed of adipose tissue until only dermis and epidermis was left.  The skin margins of the secondary defect were undermined to an appropriate distance in all directions utilizing iris scissors.  The secondary defect was closed with interrupted buried subcutaneous sutures.  The skin edges were then re-apposed with running  sutures.  The skin graft was then placed in the primary defect and oriented appropriately.
Consent was obtained from the patient. The risks and benefits to therapy were discussed in detail. Specifically, the risks of infection, scarring, bleeding, prolonged wound healing, incomplete removal, allergy to anesthesia, nerve injury and recurrence were addressed. Prior to the procedure, the treatment site was clearly identified and confirmed by the patient. All components of Universal Protocol/PAUSE Rule completed.
Complex Repair And Rotation Flap Text: The defect edges were debeveled with a #15 scalpel blade.  The primary defect was closed partially with a complex linear closure.  Given the location of the remaining defect, shape of the defect and the proximity to free margins a rotation flap was deemed most appropriate for complete closure of the defect.  Using a sterile surgical marker, an appropriate advancement flap was drawn incorporating the defect and placing the expected incisions within the relaxed skin tension lines where possible.    The area thus outlined was incised deep to adipose tissue with a #15 scalpel blade.  The skin margins were undermined to an appropriate distance in all directions utilizing iris scissors.
Spiral Flap Text: The defect edges were debeveled with a #15 scalpel blade.  Given the location of the defect, shape of the defect and the proximity to free margins a spiral flap was deemed most appropriate.  Using a sterile surgical marker, an appropriate rotation flap was drawn incorporating the defect and placing the expected incisions within the relaxed skin tension lines where possible. The area thus outlined was incised deep to adipose tissue with a #15 scalpel blade.  The skin margins were undermined to an appropriate distance in all directions utilizing iris scissors.
No Repair - Repaired With Adjacent Surgical Defect Text (Leave Blank If You Do Not Want): After the excision the defect was repaired concurrently with another surgical defect which was in close approximation.
Double Island Pedicle Flap Text: The defect edges were debeveled with a #15 scalpel blade.  Given the location of the defect, shape of the defect and the proximity to free margins a double island pedicle advancement flap was deemed most appropriate.  Using a sterile surgical marker, an appropriate advancement flap was drawn incorporating the defect, outlining the appropriate donor tissue and placing the expected incisions within the relaxed skin tension lines where possible.    The area thus outlined was incised deep to adipose tissue with a #15 scalpel blade.  The skin margins were undermined to an appropriate distance in all directions around the primary defect and laterally outward around the island pedicle utilizing iris scissors.  There was minimal undermining beneath the pedicle flap.
Rotation Flap Text: The defect edges were debeveled with a #15 scalpel blade.  Given the location of the defect, shape of the defect and the proximity to free margins a rotation flap was deemed most appropriate.  Using a sterile surgical marker, an appropriate rotation flap was drawn incorporating the defect and placing the expected incisions within the relaxed skin tension lines where possible.    The area thus outlined was incised deep to adipose tissue with a #15 scalpel blade.  The skin margins were undermined to an appropriate distance in all directions utilizing iris scissors.
Repair Performed By Another Provider Text (Leave Blank If You Do Not Want): After the tissue was excised the defect was repaired by another provider.
Hemostasis: Electrocautery
Advancement Flap (Single) Text: The defect edges were debeveled with a #15 scalpel blade.  Given the location of the defect and the proximity to free margins a single advancement flap was deemed most appropriate.  Using a sterile surgical marker, an appropriate advancement flap was drawn incorporating the defect and placing the expected incisions within the relaxed skin tension lines where possible.    The area thus outlined was incised deep to adipose tissue with a #15 scalpel blade.  The skin margins were undermined to an appropriate distance in all directions utilizing iris scissors.
Island Pedicle Flap-Requiring Vessel Identification Text: The defect edges were debeveled with a #15 scalpel blade.  Given the location of the defect, shape of the defect and the proximity to free margins an island pedicle advancement flap was deemed most appropriate.  Using a sterile surgical marker, an appropriate advancement flap was drawn, based on the axial vessel mentioned above, incorporating the defect, outlining the appropriate donor tissue and placing the expected incisions within the relaxed skin tension lines where possible.    The area thus outlined was incised deep to adipose tissue with a #15 scalpel blade.  The skin margins were undermined to an appropriate distance in all directions around the primary defect and laterally outward around the island pedicle utilizing iris scissors.  There was minimal undermining beneath the pedicle flap.
Detail Level: Detailed
Split-Thickness Skin Graft Text: The defect edges were debeveled with a #15 scalpel blade.  Given the location of the defect, shape of the defect and the proximity to free margins a split thickness skin graft was deemed most appropriate.  Using a sterile surgical marker, the primary defect shape was transferred to the donor site. The split thickness graft was then harvested.  The skin graft was then placed in the primary defect and oriented appropriately.
Star Wedge Flap Text: The defect edges were debeveled with a #15 scalpel blade.  Given the location of the defect, shape of the defect and the proximity to free margins a star wedge flap was deemed most appropriate.  Using a sterile surgical marker, an appropriate rotation flap was drawn incorporating the defect and placing the expected incisions within the relaxed skin tension lines where possible. The area thus outlined was incised deep to adipose tissue with a #15 scalpel blade.  The skin margins were undermined to an appropriate distance in all directions utilizing iris scissors.
Complex Repair And Rhombic Flap Text: The defect edges were debeveled with a #15 scalpel blade.  The primary defect was closed partially with a complex linear closure.  Given the location of the remaining defect, shape of the defect and the proximity to free margins a rhombic flap was deemed most appropriate for complete closure of the defect.  Using a sterile surgical marker, an appropriate advancement flap was drawn incorporating the defect and placing the expected incisions within the relaxed skin tension lines where possible.    The area thus outlined was incised deep to adipose tissue with a #15 scalpel blade.  The skin margins were undermined to an appropriate distance in all directions utilizing iris scissors.
Graft Donor Site Bandage (Optional-Leave Blank If You Don't Want In Note): Steri-strips and a pressure bandage were applied to the donor site.
Transposition Flap Text: The defect edges were debeveled with a #15 scalpel blade.  Given the location of the defect and the proximity to free margins a transposition flap was deemed most appropriate.  Using a sterile surgical marker, an appropriate transposition flap was drawn incorporating the defect.    The area thus outlined was incised deep to adipose tissue with a #15 scalpel blade.  The skin margins were undermined to an appropriate distance in all directions utilizing iris scissors.
Keystone Flap Text: The defect edges were debeveled with a #15 scalpel blade.  Given the location of the defect, shape of the defect a keystone flap was deemed most appropriate.  Using a sterile surgical marker, an appropriate keystone flap was drawn incorporating the defect, outlining the appropriate donor tissue and placing the expected incisions within the relaxed skin tension lines where possible. The area thus outlined was incised deep to adipose tissue with a #15 scalpel blade.  The skin margins were undermined to an appropriate distance in all directions around the primary defect and laterally outward around the flap utilizing iris scissors.
Estimated Blood Loss (Cc): minimal
Cartilage Graft Text: The defect edges were debeveled with a #15 scalpel blade.  Given the location of the defect, shape of the defect, the fact the defect involved a full thickness cartilage defect a cartilage graft was deemed most appropriate.  An appropriate donor site was identified, cleansed, and anesthetized. The cartilage graft was then harvested and transferred to the recipient site, oriented appropriately and then sutured into place.  The secondary defect was then repaired using a primary closure.
Advancement Flap (Double) Text: The defect edges were debeveled with a #15 scalpel blade.  Given the location of the defect and the proximity to free margins a double advancement flap was deemed most appropriate.  Using a sterile surgical marker, the appropriate advancement flaps were drawn incorporating the defect and placing the expected incisions within the relaxed skin tension lines where possible.    The area thus outlined was incised deep to adipose tissue with a #15 scalpel blade.  The skin margins were undermined to an appropriate distance in all directions utilizing iris scissors.
Complex Repair And Transposition Flap Text: The defect edges were debeveled with a #15 scalpel blade.  The primary defect was closed partially with a complex linear closure.  Given the location of the remaining defect, shape of the defect and the proximity to free margins a transposition flap was deemed most appropriate for complete closure of the defect.  Using a sterile surgical marker, an appropriate advancement flap was drawn incorporating the defect and placing the expected incisions within the relaxed skin tension lines where possible.    The area thus outlined was incised deep to adipose tissue with a #15 scalpel blade.  The skin margins were undermined to an appropriate distance in all directions utilizing iris scissors.
Excision Depth: adipose tissue
Composite Graft Text: The defect edges were debeveled with a #15 scalpel blade.  Given the location of the defect, shape of the defect, the proximity to free margins and the fact the defect was full thickness a composite graft was deemed most appropriate.  The defect was outline and then transferred to the donor site.  A full thickness graft was then excised from the donor site. The graft was then placed in the primary defect, oriented appropriately and then sutured into place.  The secondary defect was then repaired using a primary closure.
Post-Care Instructions: I reviewed with the patient in detail post-care instructions:\\n1. Apply bacitracin over the steri-strips.  \\n2. Cut non-stick pad (Telfa) to cover the steri-strips\\n3. Apply tape (hypafix) over the non-stick pad\\n4. Change once per day for 5 days\\n5. Shower with bandage on, change bandage after shower\\n\\nPatient is not to engage in any heavy lifting, exercise, hot tub, or swimming for the next 14 days. Should the patient develop any fevers, chills, bleeding, severe pain patient will contact the office immediately.
Complex Repair And V-Y Plasty Text: The defect edges were debeveled with a #15 scalpel blade.  The primary defect was closed partially with a complex linear closure.  Given the location of the remaining defect, shape of the defect and the proximity to free margins a V-Y plasty was deemed most appropriate for complete closure of the defect.  Using a sterile surgical marker, an appropriate advancement flap was drawn incorporating the defect and placing the expected incisions within the relaxed skin tension lines where possible.    The area thus outlined was incised deep to adipose tissue with a #15 scalpel blade.  The skin margins were undermined to an appropriate distance in all directions utilizing iris scissors.
Muscle Hinge Flap Text: The defect edges were debeveled with a #15 scalpel blade.  Given the size, depth and location of the defect and the proximity to free margins a muscle hinge flap was deemed most appropriate.  Using a sterile surgical marker, an appropriate hinge flap was drawn incorporating the defect. The area thus outlined was incised with a #15 scalpel blade.  The skin margins were undermined to an appropriate distance in all directions utilizing iris scissors.
Epidermal Sutures: 5-0 Vicryl Rapide
Burow's Advancement Flap Text: The defect edges were debeveled with a #15 scalpel blade.  Given the location of the defect and the proximity to free margins a Burow's advancement flap was deemed most appropriate.  Using a sterile surgical marker, the appropriate advancement flap was drawn incorporating the defect and placing the expected incisions within the relaxed skin tension lines where possible.    The area thus outlined was incised deep to adipose tissue with a #15 scalpel blade.  The skin margins were undermined to an appropriate distance in all directions utilizing iris scissors.
O-T Plasty Text: The defect edges were debeveled with a #15 scalpel blade.  Given the location of the defect, shape of the defect and the proximity to free margins an O-T plasty was deemed most appropriate.  Using a sterile surgical marker, an appropriate O-T plasty was drawn incorporating the defect and placing the expected incisions within the relaxed skin tension lines where possible.    The area thus outlined was incised deep to adipose tissue with a #15 scalpel blade.  The skin margins were undermined to an appropriate distance in all directions utilizing iris scissors.

## 2020-01-13 NOTE — ASSESSMENT & PLAN NOTE
On keppra subsequently  Continue risk factor modifications   Detail Level: Detailed Size Of Lesion: 3mm Size Of Lesion: 4mm pap Size Of Lesion: 5mm tan pap Size Of Lesion: ***12x7.5mm violac patch

## 2021-01-14 DIAGNOSIS — Z23 NEED FOR VACCINATION: ICD-10-CM

## 2022-04-12 NOTE — ED NOTES
hospitalist at bedside   [FreeTextEntry3] : Delayed signature due to EMR malfunction which disabled provider signature now resolved\par